# Patient Record
Sex: FEMALE | Race: WHITE | NOT HISPANIC OR LATINO | Employment: OTHER | URBAN - METROPOLITAN AREA
[De-identification: names, ages, dates, MRNs, and addresses within clinical notes are randomized per-mention and may not be internally consistent; named-entity substitution may affect disease eponyms.]

---

## 2017-01-18 ENCOUNTER — OFFICE VISIT (OUTPATIENT)
Dept: NEUROLOGY | Facility: MEDICAL CENTER | Age: 82
End: 2017-01-18
Payer: MEDICARE

## 2017-01-18 VITALS
RESPIRATION RATE: 16 BRPM | TEMPERATURE: 97.7 F | OXYGEN SATURATION: 93 % | HEART RATE: 79 BPM | WEIGHT: 194.8 LBS | HEIGHT: 67 IN | BODY MASS INDEX: 30.57 KG/M2 | SYSTOLIC BLOOD PRESSURE: 116 MMHG | DIASTOLIC BLOOD PRESSURE: 68 MMHG

## 2017-01-18 DIAGNOSIS — G62.89 OTHER POLYNEUROPATHY: ICD-10-CM

## 2017-01-18 DIAGNOSIS — R56.9 SEIZURE (HCC): Chronic | ICD-10-CM

## 2017-01-18 PROCEDURE — G8482 FLU IMMUNIZE ORDER/ADMIN: HCPCS | Performed by: NURSE PRACTITIONER

## 2017-01-18 PROCEDURE — 4040F PNEUMOC VAC/ADMIN/RCVD: CPT | Performed by: NURSE PRACTITIONER

## 2017-01-18 PROCEDURE — 1101F PT FALLS ASSESS-DOCD LE1/YR: CPT | Mod: 8P | Performed by: NURSE PRACTITIONER

## 2017-01-18 PROCEDURE — 1036F TOBACCO NON-USER: CPT | Performed by: NURSE PRACTITIONER

## 2017-01-18 PROCEDURE — G8432 DEP SCR NOT DOC, RNG: HCPCS | Performed by: NURSE PRACTITIONER

## 2017-01-18 PROCEDURE — 99214 OFFICE O/P EST MOD 30 MIN: CPT | Performed by: NURSE PRACTITIONER

## 2017-01-18 PROCEDURE — G8419 CALC BMI OUT NRM PARAM NOF/U: HCPCS | Performed by: NURSE PRACTITIONER

## 2017-01-18 NOTE — PROGRESS NOTES
Subjective:      Judit Ramsey is a 86 y.o. female who presents with Follow-Up for Partial Seizure Disorder.     Here with her  today.    HPI   No concern for seizures and denies an changes in cognition or memory or lapse of awareness.  Last concern for seizure was April 2010.  She continues to take generic Trileptal and Depakote.    Peripheral neuropathy:  Of most concern today.  She is experiencing continuous pain and also notices that it worsens in the evening hours.  She has not tolerated Lyrica and is currently taking a low dose of GBP-- 100mg qhs.  She was unaware that the GBP helps with neuropathy.  Has not noticed drowsiness from the medication addition however she has a low energy level.  Still using topical cream applied each evening which minimally helps.  Her  seems to downplay or discount Judit's discomfort however is more supportive for her to have extra assistance to treat her today.    Arthritic Pain:  Has a history of falls and does not report any falls recently.  She has to move and stand up as well as walk very cautiously.    Her balance is stable when she uses her walker and feels secure with it when walking.      Current Outpatient Prescriptions   Medication Sig Dispense Refill   • losartan (COZAAR) 25 MG Tab Take 1 Tab by mouth every day. 90 Tab 3   • atorvastatin (LIPITOR) 40 MG Tab Take 1 Tab by mouth every evening. 90 Tab 3   • carvedilol (COREG) 6.25 MG Tab Take 1 Tab by mouth 2 times a day, with meals. 180 Tab 3   • divalproex (DEPAKOTE) 500 MG Tablet Delayed Response Take 1 Tab by mouth 2 Times a Day. 180 Tab 3   • oxcarbazepine (TRILEPTAL) 300 MG Tab Take 1 Tab by mouth 2 times a day. 180 Tab 3   • conjugated estrogen (PREMARIN) 0.625 MG/GM CREA Insert 0.5 g in vagina every day.     • aspirin 81 MG tablet Take 81 mg by mouth every day.     • pentosan (ELMIRON) 100 MG CAPS Take 100 mg by mouth 3 times a day before meals.     • omeprazole (PRILOSEC) 20 MG CPDR Take 20 mg  "by mouth every day.       • Ascorbic Acid (VITAMIN C) 100 MG Tab Take  by mouth.     • VITAMIN D, CHOLECALCIFEROL, PO Take  by mouth.     • gabapentin (NEURONTIN) 100 MG Cap Take 100 mg by mouth 2 Times a Day.     • methenamine (MANDELAMINE) 1 GM tablet Take 1 g by mouth 4 times a day.     • pentosan (ELMIRON) 100 MG Cap Take 100 mg by mouth 3 times a day before meals.     • Mirabegron ER (MYRBETRIQ) 50 MG TABLET SR 24 HR Take  by mouth.     • fluticasone-salmeterol (ADVAIR DISKUS) 100-50 MCG/DOSE AEROSOL POWDER, BREATH ACTIVATED Inhale 1 Puff by mouth 2 times a day. Rinse mouth after each use. 3 Inhaler 3   • azithromycin (ZITHROMAX) 250 MG Tab Take 2 tablets on day 1, then take 1 tablet a day for 4 days. 6 Tab 2     No current facility-administered medications for this visit.       Review of Systems   Constitutional: Positive for malaise/fatigue.   HENT: Positive for hearing loss (chronic). Negative for nosebleeds and sore throat.         No recent head injury.   Eyes: Negative for double vision.        No new loss of vision.   Respiratory: Positive for cough (mild).         No recent lung infections.   Cardiovascular: Positive for leg swelling (mild). Negative for chest pain.   Gastrointestinal: Negative for nausea, vomiting, abdominal pain and diarrhea.   Genitourinary: Negative.    Musculoskeletal: Positive for myalgias and joint pain. Negative for falls.   Skin: Negative.    Neurological: Negative for dizziness, seizures and headaches.   Endo/Heme/Allergies:        No history of endocrine dysfunction.  No new problems.   Psychiatric/Behavioral: Positive for depression (possible). The patient is not nervous/anxious.         No recent mood changes.          Objective:     /68 mmHg  Pulse 79  Temp(Src) 36.5 °C (97.7 °F)  Resp 16  Ht 1.702 m (5' 7\")  Wt 88.361 kg (194 lb 12.8 oz)  BMI 30.50 kg/m2  SpO2 93%     Physical Exam   Constitutional: She is oriented to person, place, and time. She appears " well-developed and well-nourished.   HENT:   Head: Normocephalic and atraumatic.   Nose: Nose normal.   Eyes: EOM are normal.   Neck: Normal range of motion.   Cardiovascular: Normal rate and regular rhythm.    Pulmonary/Chest: Effort normal.   Neurological: She is alert and oriented to person, place, and time. She exhibits normal muscle tone. Coordination and gait (using walker, walking with forward bent posture but can stand up straight) abnormal.   No observable changes in neurologic status.  See initial new patient examination for details.    Skin: Skin is warm. There is pallor.   Psychiatric: She has a normal mood and affect.   Quiet but appropriate             Assessment/Plan:     Seizure disorder:  Last known seizure was 4/2010.    Continue VPA 500mg BID and OXC 300mg BID.    Continue supplements as recommended.    Peripheral neuropathy:  Ongoing and more of a concern today.  Using a topical cream for relief.  Has not tolerated Lyrica in the past.  Recently prescribed GBP 100mg qhs-- instructed to increase GBP to 100mg qpm and 100mg qhs.    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  Now using walker.    Return for follow-up in 4 months.  Needs to have drug levels collected in the next 6 months.  She did just have her labs drawn 2 days ago as well not at the met this time.     I spent 35 minutes with this patient, over fifty percent was spent counseling patient on their condition, best management practices, reviewing test results and risks and benefits of treatment.

## 2017-01-18 NOTE — MR AVS SNAPSHOT
"        Judit Ramsey   2017 1:00 PM   Office Visit   MRN: [id number]    Department:  [facility]   Dept Phone:  [phone]    Description:  Female : 1930   Provider:  DENZEL Stephens           Reason for Visit     Follow-Up Epilepsy      Allergies as of 2017     Allergen Noted Reactions    Ciprofloxacin 2011       SEIZURES.    Prednisone 2016         You were diagnosed with     Seizure (CMS-formerly [facility])   [[id number]]         Vital Signs     Blood Pressure Pulse Temperature Respirations Height Weight    116/68 mmHg 79 36.5 °C (97.7 °F) 16 1.702 m (5' 7\") 88.361 kg (194 lb 12.8 oz)    Body Mass Index Oxygen Saturation Smoking Status             30.50 kg/m2 93% Never Smoker          Basic Information     Date Of Birth Sex Race Ethnicity Preferred Language    1930 Female White Non- English      Your appointments     2017  1:40 PM   Established Patient Pul with Andria Forde M.D.   [facility] (--)    [address]  [address]  [place] [address]   [phone]            2017  2:00 PM   FOLLOW UP with Eleazar Wynn M.D.   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   3/19/2008 - Present    Left bundle branch block (Chronic) I44.7   3/29/2011 - Present    Osteoarthritis (Chronic) M19.90   3/19/2008 - Present    Seizure (CMS-HCC) (Chronic) R56.9   2008 - Present    Subjective visual disturbance (Chronic) H53.10   5/3/2011 - Present    Abnormal myocardial perfusion study    2015 - Present    Atypical chest pain R07.89   2/10/2016 - " Present    Epiphora due to insufficient drainage of left side H04.222   8/3/2016 - Present    Hypertension I10   8/24/2016 - Present      Health Maintenance        Date Due Completion Dates    IMM DTaP/Tdap/Td Vaccine (1 - Tdap) 6/7/1949 ---    PAP SMEAR 6/7/1951 ---    MAMMOGRAM 6/7/1970 ---    COLONOSCOPY 6/7/1980 ---    IMM ZOSTER VACCINE 6/7/1990 ---    BONE DENSITY 6/7/1995 ---    IMM PNEUMOCOCCAL 65+ (ADULT) LOW/MEDIUM RISK SERIES (1 of 2 - PCV13) 6/7/1995 ---    IMM INFLUENZA (1) 9/1/2016 10/1/2015            Current Immunizations     Influenza TIV (IM) 10/1/2015      Below and/or attached are the medications your provider expects you to take. Review all of your home medications and newly ordered medications with your provider and/or pharmacist. Follow medication instructions as directed by your provider and/or pharmacist. Please keep your medication list with you and share with your provider. Update the information when medications are discontinued, doses are changed, or new medications (including over-the-counter products) are added; and carry medication information at all times in the event of emergency situations     Allergies:  CIPROFLOXACIN - (reactions not documented)     PREDNISONE - (reactions not documented)               Medications  Valid as of: January 18, 2017 -  1:28 PM    Generic Name Brand Name Tablet Size Instructions for use    Aspirin (Tab) aspirin 81 MG Take 81 mg by mouth every day.        Atorvastatin Calcium (Tab) LIPITOR 40 MG Take 1 Tab by mouth every evening.        Carvedilol (Tab) COREG 6.25 MG Take 1 Tab by mouth 2 times a day, with meals.        Divalproex Sodium (Tablet Delayed Response) DEPAKOTE 500 MG Take 1 Tab by mouth 2 Times a Day.        Estrogens, Conjugated (Cream) PREMARIN 0.625 MG/GM Insert 0.5 g in vagina every day.        Losartan Potassium (Tab) COZAAR 25 MG Take 1 Tab by mouth every day.        Omeprazole (CAPSULE DELAYED RELEASE) PRILOSEC 20 MG Take 20 mg by  mouth every day.          OXcarbazepine (Tab) TRILEPTAL 300 MG Take 1 Tab by mouth 2 times a day.        Pentosan Polysulfate Sodium (Cap) ELMIRON 100 MG Take 100 mg by mouth 3 times a day before meals.        .                 Medicines prescribed today were sent to:     [facility] - [place] [address] [facility]    [address] [facility] [address] [facility] [address]    Phone: [phone] Fax: [phone]    Open 24 Hours?: No    [facility] - [place], [place] - [address] [facility]    [address] [facility] [address]    Phone: [phone] Fax: [phone]    Open 24 Hours?: No      Medication refill instructions:       If your prescription bottle indicates you have medication refills left, it is not necessary to call your provider’s office. Please contact your pharmacy and they will refill your medication.    If your prescription bottle indicates you do not have any refills left, you may request refills at any time through one of the following ways: The online [employer] system (except Urgent Care), by calling your provider’s office, or by asking your pharmacy to contact your provider’s office with a refill request. Medication refills are processed only during regular business hours and may not be available until the next business day. Your provider may request additional information or to have a follow-up visit with you prior to refilling your medication.   *Please Note: Medication refills are assigned a new Rx number when refilled electronically. Your pharmacy may indicate that no refills were authorized even though a new prescription for the same medication is available at the pharmacy. Please request the medicine by name with the pharmacy before contacting your provider for a refill.           TOMS Shoeshart Status: Patient Declined

## 2017-01-19 ENCOUNTER — OFFICE VISIT (OUTPATIENT)
Dept: PULMONOLOGY | Facility: HOSPICE | Age: 82
End: 2017-01-19
Payer: MEDICARE

## 2017-01-19 ENCOUNTER — TELEPHONE (OUTPATIENT)
Dept: PULMONOLOGY | Facility: HOSPICE | Age: 82
End: 2017-01-19

## 2017-01-19 VITALS
SYSTOLIC BLOOD PRESSURE: 122 MMHG | BODY MASS INDEX: 29.98 KG/M2 | OXYGEN SATURATION: 92 % | WEIGHT: 191 LBS | HEART RATE: 87 BPM | DIASTOLIC BLOOD PRESSURE: 70 MMHG | HEIGHT: 67 IN | RESPIRATION RATE: 16 BRPM | TEMPERATURE: 97.2 F

## 2017-01-19 DIAGNOSIS — J40 BRONCHITIS: ICD-10-CM

## 2017-01-19 DIAGNOSIS — J98.19 RIGHT MIDDLE LOBE SYNDROME: ICD-10-CM

## 2017-01-19 PROCEDURE — 99214 OFFICE O/P EST MOD 30 MIN: CPT | Performed by: INTERNAL MEDICINE

## 2017-01-19 RX ORDER — AZITHROMYCIN 250 MG/1
TABLET, FILM COATED ORAL
Qty: 6 TAB | Refills: 2 | Status: SHIPPED | OUTPATIENT
Start: 2017-01-19 | End: 2017-08-04

## 2017-01-19 RX ORDER — RIBOFLAVIN (VITAMIN B2) 100 MG
TABLET ORAL
COMMUNITY
End: 2019-06-28 | Stop reason: CLARIF

## 2017-01-19 RX ORDER — GABAPENTIN 100 MG/1
100 CAPSULE ORAL 2 TIMES DAILY
COMMUNITY
End: 2019-06-28 | Stop reason: CLARIF

## 2017-01-19 RX ORDER — METHENAMINE MANDELATE 1000 MG/1
1 TABLET, FILM COATED ORAL 4 TIMES DAILY
COMMUNITY
End: 2018-02-21

## 2017-01-19 NOTE — MR AVS SNAPSHOT
"Judit Ramsey   2017 1:40 PM   Office Visit   MRN: [id number]    Department:  [facility]   Dept Phone:  [phone]    Description:  Female : 1930   Provider:  Andria Forde M.D.           Reason for Visit     Follow-Up SP lobectomy      Allergies as of 2017     Allergen Noted Reactions    Ciprofloxacin 2011       SEIZURES.    Prednisone 2016         You were diagnosed with     Bronchitis   [930258]         Vital Signs     Blood Pressure Pulse Temperature Respirations Height Weight    122/70 mmHg 87 36.2 °C (97.2 °F) 16 1.702 m (5' 7\") 86.637 kg (191 lb)    Body Mass Index Oxygen Saturation Smoking Status             29.91 kg/m2 92% Never Smoker          Basic Information     Date Of Birth Sex Race Ethnicity Preferred Language    1930 Female White Non- English      Your appointments     2017  1:40 PM   Established Patient Pul with Andria Forde M.D.   [facility] (--)    [address]  [address]  [place] [id number] [phone]            2017  2:00 PM   FOLLOW UP with Eleazar Wynn M.D.   [facility] (--)    [address], [address]  [place] [address] [phone]            2017  1:00 PM   Established Patient Pul with Andria Forde M.D.   [facility] (--)    [address]  [address]  [place] [address] [phone]            2017  1:00 PM   Follow Up Visit with DENZEL Stephens   [facility] Neurology (--)    [address]  [place] [address] [phone]           You will be receiving a confirmation call a few days before your appointment from our automated call confirmation system.              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   3/19/2008 - Present    Left bundle branch block (Chronic) I44.7   3/29/2011 - Present    Osteoarthritis (Chronic) M19.90   3/19/2008 - Present    Seizure (CMS-HCC) (Chronic) R56.9   9/2/2008 - Present    Subjective visual disturbance (Chronic) H53.10   5/3/2011 - Present    Abnormal myocardial perfusion study    4/22/2015 - Present    Atypical chest pain R07.89   2/10/2016 - Present    Epiphora due to insufficient drainage of left side H04.222   8/3/2016 - Present    Hypertension I10   8/24/2016 - Present      Health Maintenance        Date Due Completion Dates    IMM DTaP/Tdap/Td Vaccine (1 - Tdap) 6/7/1949 ---    PAP SMEAR 6/7/1951 ---    MAMMOGRAM 6/7/1970 ---    COLONOSCOPY 6/7/1980 ---    IMM ZOSTER VACCINE 6/7/1990 ---    BONE DENSITY 6/7/1995 ---    IMM PNEUMOCOCCAL 65+ (ADULT) LOW/MEDIUM RISK SERIES (1 of 2 - PCV13) 6/7/1995 ---    IMM INFLUENZA (1) 9/1/2016 10/1/2015            Current Immunizations     13-VALENT PCV PREVNAR 9/25/2015    Influenza TIV (IM) 9/28/2016, 10/1/2015, 11/22/2011      Below and/or attached are the medications your provider expects you to take. Review all of your home medications and newly ordered medications with your provider and/or pharmacist. Follow medication instructions as directed by your provider and/or pharmacist. Please keep your medication list with you and share with your provider. Update the information when medications are discontinued, doses are changed, or new medications (including over-the-counter products) are added; and carry medication information at all times in the event of emergency situations     Allergies:  CIPROFLOXACIN - (reactions not documented)     PREDNISONE - (reactions not documented)               Medications  Valid as of: January 19, 2017 -  1:27 PM    Generic Name Brand Name Tablet Size Instructions for use    Ascorbic Acid  (Tab) Vitamin C 100 MG Take  by mouth.        Aspirin (Tab) aspirin 81 MG Take 81 mg by mouth every day.        Atorvastatin Calcium (Tab) LIPITOR 40 MG Take 1 Tab by mouth every evening.        Azithromycin (Tab) ZITHROMAX 250 MG Take 2 tablets on day 1, then take 1 tablet a day for 4 days.        Carvedilol (Tab) COREG 6.25 MG Take 1 Tab by mouth 2 times a day, with meals.        Cholecalciferol   Take  by mouth.        Divalproex Sodium (Tablet Delayed Response) DEPAKOTE 500 MG Take 1 Tab by mouth 2 Times a Day.        Estrogens, Conjugated (Cream) PREMARIN 0.625 MG/GM Insert 0.5 g in vagina every day.        Fluticasone-Salmeterol (AEROSOL POWDER, BREATH ACTIVATED) ADVAIR 100-50 MCG/DOSE Inhale 1 Puff by mouth 2 times a day. Rinse mouth after each use.        Gabapentin (Cap) NEURONTIN 100 MG Take 100 mg by mouth 2 Times a Day.        Losartan Potassium (Tab) COZAAR 25 MG Take 1 Tab by mouth every day.        Methenamine Mandelate (Tab) MANDELAMINE 1 GM Take 1 g by mouth 4 times a day.        Mirabegron (TABLET SR 24 HR) Mirabegron ER 50 MG Take  by mouth.        Omeprazole (CAPSULE DELAYED RELEASE) PRILOSEC 20 MG Take 20 mg by mouth every day.          OXcarbazepine (Tab) TRILEPTAL 300 MG Take 1 Tab by mouth 2 times a day.        Pentosan Polysulfate Sodium (Cap) ELMIRON 100 MG Take 100 mg by mouth 3 times a day before meals.        Pentosan Polysulfate Sodium (Cap) ELMIRON 100 MG Take 100 mg by mouth 3 times a day before meals.        .                 Medicines prescribed today were sent to:     [facility] - [address] [facility] [address]    Phone: [phone] Fax: [phone]    Open 24 Hours?: No    [id number] - [place] - [address] [facility]    [address] [place] [address]    Phone: [phone] Fax: [phone]    Open 24 Hours?: No      Medication refill instructions:       If your prescription bottle indicates  you have medication refills left, it is not necessary to call your provider’s office. Please contact your pharmacy and they will refill your medication.    If your prescription bottle indicates you do not have any refills left, you may request refills at any time through one of the following ways: The online [employer] system (except Urgent Care), by calling your provider’s office, or by asking your pharmacy to contact your provider’s office with a refill request. Medication refills are processed only during regular business hours and may not be available until the next business day. Your provider may request additional information or to have a follow-up visit with you prior to refilling your medication.   *Please Note: Medication refills are assigned a new Rx number when refilled electronically. Your pharmacy may indicate that no refills were authorized even though a new prescription for the same medication is available at the pharmacy. Please request the medicine by name with the pharmacy before contacting your provider for a refill.           Kwanjihart Status: Patient Declined

## 2017-01-19 NOTE — PROGRESS NOTES
"Chief Complaint   Patient presents with   • Follow-Up     SP lobectomy       HPI: This patient is an 86 y.o. Female who returns for pulmonary follow-up. 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 She is a lifelong nonsmoker. She has mild exertional dyspnea, and over the past week developed a productive cough. She typically has very infrequent cough. She uses Advair discus 100/50. Denies fevers, chills, headaches, wheezing, chest tightness or edema. She feels well overall and has no complaints other than cough. She has not required antibiotics in the past 6 months.  She is up-to-date on influenza and pneumococcal vaccines.    Past Medical History   Diagnosis Date   • CHF (congestive heart failure) (CMS-HCC) 8/3/2011   • Esophageal reflux 3/19/2008   • Fatigue 5/3/2011   • HLD (hyperlipidemia) 3/29/2011   • Unspecified vertiginous syndromes and labyrinthine disorders 3/19/2008   • Left bundle branch block 3/29/2011   • Osteoarthritis 3/19/2008   • Subjective visual disturbance, unspecified 5/3/2011   • Incontinence    • Arthritis      bilateral knees   • Cancer (CMS-HCC)      skin cancer   • Hypertension    • Hiatus hernia syndrome    • Asthma    • Urinary bladder disorder    • Pneumonia      2010   • Breath shortness      02 @ 2L con't   • Cataract      bilateral IOL   • CHEST PAIN 5/3/2011   • Heart burn      \"chronic pain in my stomach\"   • Seizure (CMS-HCC) 9/2/2008     Pt states some anesthesia causes seizures, last seizure 4 years ago   • Anesthesia      Pt and spouse state, anesthesia causes her seizures, she has had several seizures at least 4 in PACU, following several different surgeries.  She has had colonoscopies and cataract surgery and did fine.       Social History     Social History   • Marital Status:      Spouse Name: N/A   • Number of Children: N/A   • Years of Education: N/A     Occupational History   • Not on file.     Social History Main Topics   • Smoking " status: Never Smoker    • Smokeless tobacco: Never Used   • Alcohol Use: No   • Drug Use: No   • Sexual Activity: Not on file     Other Topics Concern   • Not on file     Social History Narrative       Family History   Problem Relation Age of Onset   • Stroke Mother    • Heart Disease Father        Current Outpatient Prescriptions on File Prior to Visit   Medication Sig Dispense Refill   • losartan (COZAAR) 25 MG Tab Take 1 Tab by mouth every day. 90 Tab 3   • atorvastatin (LIPITOR) 40 MG Tab Take 1 Tab by mouth every evening. 90 Tab 3   • carvedilol (COREG) 6.25 MG Tab Take 1 Tab by mouth 2 times a day, with meals. 180 Tab 3   • divalproex (DEPAKOTE) 500 MG Tablet Delayed Response Take 1 Tab by mouth 2 Times a Day. 180 Tab 3   • oxcarbazepine (TRILEPTAL) 300 MG Tab Take 1 Tab by mouth 2 times a day. 180 Tab 3   • conjugated estrogen (PREMARIN) 0.625 MG/GM CREA Insert 0.5 g in vagina every day.     • aspirin 81 MG tablet Take 81 mg by mouth every day.     • pentosan (ELMIRON) 100 MG CAPS Take 100 mg by mouth 3 times a day before meals.     • omeprazole (PRILOSEC) 20 MG CPDR Take 20 mg by mouth every day.         No current facility-administered medications on file prior to visit.       Allergies: Ciprofloxacin and Prednisone    ROS:   Constitutional: Denies fevers, chills, night sweats, fatigue or weight loss  Eyes: Denies vision loss, pain, drainage, double vision  Ears, Nose, Throat: Denies earache, difficulty hearing, tinnitus, nasal congestion, hoarseness  Cardiovascular: Denies chest pain, tightness, palpitations, orthopnea or edema  Respiratory: As in history of present illness  Sleep: Denies daytime sleepiness, snoring, apneas, insomnia, morning headaches  GI: Denies heartburn, dysphagia, nausea, abdominal pain, diarrhea or constipation  : Denies frequent urination, hematuria, discharge or painful urination  Musculoskeletal: Denies back pain, painful joints, sore muscles  Neurological: Denies weakness or  "headaches  Skin: No rashes    Blood pressure 122/70, pulse 87, temperature 36.2 °C (97.2 °F), resp. rate 16, height 1.702 m (5' 7\"), weight 86.637 kg (191 lb), SpO2 92 %.  Multi-Ox Readings  Multi Ox #1     O2 sat % at rest     O2 sat % on exertion     O2 sat average on exertion     Multi Ox #2     O2 sat % at rest     O2 sat % on exertion     O2 sat average on exertion       Oxygen Use     Oxygen Frequency     Duration of need     Is the patient mobile within the home?     CPAP Use?     BIPAP Use?     Servo Titration         Physical Exam:  Appearance: Well-nourished, well-developed, in no acute distress  HEENT: Normocephalic, atraumatic, white sclera, PERRLA, oropharynx clear  Neck: No adenopathy or masses  Respiratory: no intercostal retractions or accessory muscle use  Lungs auscultation: Clear to auscultation bilaterally  Cardiovascular: Regular rate rhythm. No murmurs, rubs or gallops.  No LE edema  Abdomen: soft, nondistended  Gait: Normal  Digits: No clubbing, cyanosis  Motor: No focal deficits  Orientation: Oriented to time, person and place    Diagnosis:  1. Bronchitis  fluticasone-salmeterol (ADVAIR DISKUS) 100-50 MCG/DOSE AEROSOL POWDER, BREATH ACTIVATED    azithromycin (ZITHROMAX) 250 MG Tab   2. Right middle lobe syndrome      s/p lobectomy       Plan:  Overall, Judit continues to do well with no significant respiratory symptomatology.  Z-Grey for acute bronchitis.  Continue Advair discus 100/50 twice a day. Continue albuterol HFA 1-2 puffs every 4 hours when necessary.  Return in about 6 months (around 7/19/2017) for follow up visit with Andria Forde MD.        "

## 2017-01-20 NOTE — TELEPHONE ENCOUNTER
I spoke with Starla the pharmacist to call in Advair Diskus 100-50 Inhale 1 puff by mouth 2 times a  Day. Rinse mouth after each use. Qty:3 Refills:3 per  prescription and per Patient request.  [facility] pharmacy number [phone]

## 2017-01-24 ASSESSMENT — ENCOUNTER SYMPTOMS
HEADACHES: 0
DEPRESSION: 1
NERVOUS/ANXIOUS: 0
DIARRHEA: 0
VOMITING: 0
DIZZINESS: 0
FALLS: 0
NAUSEA: 0
MYALGIAS: 1
SEIZURES: 0
ABDOMINAL PAIN: 0
DOUBLE VISION: 0
SORE THROAT: 0
COUGH: 1

## 2017-03-06 ENCOUNTER — OFFICE VISIT (OUTPATIENT)
Dept: CARDIOLOGY | Facility: MEDICAL CENTER | Age: 82
End: 2017-03-06
Payer: MEDICARE

## 2017-03-06 VITALS
WEIGHT: 191 LBS | HEIGHT: 67 IN | BODY MASS INDEX: 29.98 KG/M2 | HEART RATE: 104 BPM | DIASTOLIC BLOOD PRESSURE: 70 MMHG | SYSTOLIC BLOOD PRESSURE: 110 MMHG | OXYGEN SATURATION: 92 %

## 2017-03-06 DIAGNOSIS — I44.7 LEFT BUNDLE BRANCH BLOCK: Chronic | ICD-10-CM

## 2017-03-06 DIAGNOSIS — R07.89 ATYPICAL CHEST PAIN: ICD-10-CM

## 2017-03-06 PROCEDURE — G8482 FLU IMMUNIZE ORDER/ADMIN: HCPCS | Performed by: INTERNAL MEDICINE

## 2017-03-06 PROCEDURE — G8432 DEP SCR NOT DOC, RNG: HCPCS | Performed by: INTERNAL MEDICINE

## 2017-03-06 PROCEDURE — 1036F TOBACCO NON-USER: CPT | Performed by: INTERNAL MEDICINE

## 2017-03-06 PROCEDURE — 99213 OFFICE O/P EST LOW 20 MIN: CPT | Performed by: INTERNAL MEDICINE

## 2017-03-06 PROCEDURE — 4040F PNEUMOC VAC/ADMIN/RCVD: CPT | Performed by: INTERNAL MEDICINE

## 2017-03-06 PROCEDURE — G8420 CALC BMI NORM PARAMETERS: HCPCS | Performed by: INTERNAL MEDICINE

## 2017-03-06 PROCEDURE — 1101F PT FALLS ASSESS-DOCD LE1/YR: CPT | Mod: 8P | Performed by: INTERNAL MEDICINE

## 2017-03-06 ASSESSMENT — ENCOUNTER SYMPTOMS
SHORTNESS OF BREATH: 1
BRUISES/BLEEDS EASILY: 0
BLURRED VISION: 1
HEARTBURN: 1
NEUROLOGICAL NEGATIVE: 1
PALPITATIONS: 0
WEIGHT LOSS: 0

## 2017-03-06 NOTE — MR AVS SNAPSHOT
"Judti Ramsey   3/6/2017 9:40 AM   Office Visit   MRN: [id number]    Department:  [facility]   Dept Phone:  [phone]    Description:  Female : 1930   Provider:  Eleazar Wynn M.D.           Reason for Visit     Follow-Up           Allergies as of 3/6/2017     Allergen Noted Reactions    Ciprofloxacin 2011       SEIZURES.    Prednisone 2016         Vital Signs     Blood Pressure Pulse Height Weight Body Mass Index Oxygen Saturation    110/70 mmHg 104 1.702 m (5' 7.01\") 86.637 kg (191 lb) 29.91 kg/m2 92%    Smoking Status                   Never Smoker            Basic Information     Date Of Birth Sex Race Ethnicity Preferred Language    1930 Female White Non- English      Your appointments     2017  1:00 PM   Established Patient Pul with Andria Forde M.D.   [facility] Pulmonary Medicine (--)    [address]  [address]  [facility] [id number] [phone]            2017  1:00 PM   Follow Up Visit with DENZEL Stephens   [facility] Neurology (--)    [address], [address]  [place] [address] [phone]           You will be receiving a confirmation call a few days before your appointment from our automated call confirmation system.              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   3/19/2008 - Present    Left bundle branch block (Chronic) I44.7   3/29/2011 - Present    Osteoarthritis (Chronic) M19.90   3/19/2008 - Present    Seizure (CMS-HCC) (Chronic) R56.9   2008 - Present    Subjective visual disturbance (Chronic) H53.10   5/3/2011 - Present    Abnormal myocardial perfusion study    2015 - Present    Atypical chest pain R07.89   2/10/2016 " - Present    Epiphora due to insufficient drainage of left side H04.222   8/3/2016 - Present    Hypertension I10   8/24/2016 - Present      Health Maintenance        Date Due Completion Dates    IMM DTaP/Tdap/Td Vaccine (1 - Tdap) 6/7/1949 ---    PAP SMEAR 6/7/1951 ---    MAMMOGRAM 6/7/1970 ---    COLONOSCOPY 6/7/1980 ---    IMM ZOSTER VACCINE 6/7/1990 ---    BONE DENSITY 6/7/1995 ---    IMM PNEUMOCOCCAL 65+ (ADULT) LOW/MEDIUM RISK SERIES (2 of 2 - PPSV23) 9/25/2016 9/25/2015            Current Immunizations     13-VALENT PCV PREVNAR 9/25/2015    Influenza TIV (IM) 9/28/2016, 10/1/2015, 11/22/2011      Below and/or attached are the medications your provider expects you to take. Review all of your home medications and newly ordered medications with your provider and/or pharmacist. Follow medication instructions as directed by your provider and/or pharmacist. Please keep your medication list with you and share with your provider. Update the information when medications are discontinued, doses are changed, or new medications (including over-the-counter products) are added; and carry medication information at all times in the event of emergency situations     Allergies:  CIPROFLOXACIN - (reactions not documented)     PREDNISONE - (reactions not documented)               Medications  Valid as of: March 06, 2017 - 10:39 AM    Generic Name Brand Name Tablet Size Instructions for use    Ascorbic Acid (Tab) Vitamin C 100 MG Take  by mouth.        Aspirin (Tab) aspirin 81 MG Take 81 mg by mouth every day.        Atorvastatin Calcium (Tab) LIPITOR 40 MG Take 1 Tab by mouth every evening.        Azithromycin (Tab) ZITHROMAX 250 MG Take 2 tablets on day 1, then take 1 tablet a day for 4 days.        Carvedilol (Tab) COREG 6.25 MG Take 1 Tab by mouth 2 times a day, with meals.        Cholecalciferol   Take  by mouth.        Divalproex Sodium (Tablet Delayed Response) DEPAKOTE 500 MG Take 1 Tab by mouth 2 Times a Day.         Estrogens, Conjugated (Cream) PREMARIN 0.625 MG/GM Insert 0.5 g in vagina every day.        Fluticasone-Salmeterol (AEROSOL POWDER, BREATH ACTIVATED) ADVAIR 100-50 MCG/DOSE Inhale 1 Puff by mouth 2 times a day. Rinse mouth after each use.        Gabapentin (Cap) NEURONTIN 100 MG Take 100 mg by mouth 2 Times a Day.        Losartan Potassium (Tab) COZAAR 25 MG Take 1 Tab by mouth every day.        Methenamine Mandelate (Tab) MANDELAMINE 1 GM Take 1 g by mouth 4 times a day.        Mirabegron (TABLET SR 24 HR) Mirabegron ER 50 MG Take  by mouth.        Omeprazole (CAPSULE DELAYED RELEASE) PRILOSEC 20 MG Take 20 mg by mouth every day.          OXcarbazepine (Tab) TRILEPTAL 300 MG Take 1 Tab by mouth 2 times a day.        Pentosan Polysulfate Sodium (Cap) ELMIRON 100 MG Take 100 mg by mouth 3 times a day before meals.        Pentosan Polysulfate Sodium (Cap) ELMIRON 100 MG Take 100 mg by mouth 3 times a day before meals.        .                 Medicines prescribed today were sent to:     [facility] - [address] [facility] [address]    Phone: [phone] Fax: [phone]    Open 24 Hours?: No    [facility] [address] [place], [place] - [address] [facility]    [id number] [place] [address]    Phone: [phone] Fax: [phone]    Open 24 Hours?: No      Medication refill instructions:       If your prescription bottle indicates you have medication refills left, it is not necessary to call your provider’s office. Please contact your pharmacy and they will refill your medication.    If your prescription bottle indicates you do not have any refills left, you may request refills at any time through one of the following ways: The online [employer] system (except Urgent Care), by calling your provider’s office, or by asking your pharmacy to contact your provider’s office with a refill request. Medication refills are processed only during regular business  hours and may not be available until the next business day. Your provider may request additional information or to have a follow-up visit with you prior to refilling your medication.   *Please Note: Medication refills are assigned a new Rx number when refilled electronically. Your pharmacy may indicate that no refills were authorized even though a new prescription for the same medication is available at the pharmacy. Please request the medicine by name with the pharmacy before contacting your provider for a refill.           MyChart Status: Patient Declined

## 2017-03-06 NOTE — Clinical Note
"     [facility]   [address], [address]  [place] [address]  Phone: [phone]  Fax: [phone]              Judit Ramsey  6/7/1930    Encounter Date: 3/6/2017    Eleazar Wynn M.D.          PROGRESS NOTE:  Subjective:   Judit Ramsey is a 86 y.o. female who presents today for follow-up of left bundle branch block and history of atypical chest pain. She has had no syncope or presyncope.  The patient has persistent left sided localized chest pain. Today's pain is bit higher than noted previously. She has intense pain to palpation in the area that she complains about. No pain radiating across her chest or to her neck or jaw. She is not physically active and uses a walker because of knee arthritis    Past Medical History   Diagnosis Date   • CHF (congestive heart failure) (CMS-HCC) 8/3/2011   • Esophageal reflux 3/19/2008   • Fatigue 5/3/2011   • HLD (hyperlipidemia) 3/29/2011   • Unspecified vertiginous syndromes and labyrinthine disorders 3/19/2008   • Left bundle branch block 3/29/2011   • Osteoarthritis 3/19/2008   • Subjective visual disturbance, unspecified 5/3/2011   • Incontinence    • Arthritis      bilateral knees   • Cancer (CMS-HCC)      skin cancer   • Hypertension    • Hiatus hernia syndrome    • Asthma    • Urinary bladder disorder    • Pneumonia      2010   • Breath shortness      02 @ 2L con't   • Cataract      bilateral IOL   • CHEST PAIN 5/3/2011   • Heart burn      \"chronic pain in my stomach\"   • Seizure (CMS-HCC) 9/2/2008     Pt states some anesthesia causes seizures, last seizure 4 years ago   • Anesthesia      Pt and spouse state, anesthesia causes her seizures, she has had several seizures at least 4 in PACU, following several different surgeries.  She has had colonoscopies and cataract surgery and did fine.     Past Surgical History   Procedure Laterality Date   • General lung surgery       LUNG SEGMENTAL RESECTION.   • " Cholecystectomy     • Knee replacement, total  2010     BILATERAL   • Abdominal hysterectomy total     • Ectropian repair Left 8/3/2016     Procedure: ECTROPIAN REPAIR - LOWER LID WITH LATERAL TARSAL STRIP;  Surgeon: Daniel Pimentel M.D.;  Location: [facility];  Service:      Family History   Problem Relation Age of Onset   • Stroke Mother    • Heart Disease Father      History   Smoking status   • Never Smoker    Smokeless tobacco   • Never Used     Allergies   Allergen Reactions   • Ciprofloxacin      SEIZURES.   • Prednisone      Outpatient Encounter Prescriptions as of 3/6/2017   Medication Sig Dispense Refill   • Ascorbic Acid (VITAMIN C) 100 MG Tab Take  by mouth.     • VITAMIN D, CHOLECALCIFEROL, PO Take  by mouth.     • gabapentin (NEURONTIN) 100 MG Cap Take 100 mg by mouth 2 Times a Day.     • methenamine (MANDELAMINE) 1 GM tablet Take 1 g by mouth 4 times a day.     • pentosan (ELMIRON) 100 MG Cap Take 100 mg by mouth 3 times a day before meals.     • Mirabegron ER (MYRBETRIQ) 50 MG TABLET SR 24 HR Take  by mouth.     • fluticasone-salmeterol (ADVAIR DISKUS) 100-50 MCG/DOSE AEROSOL POWDER, BREATH ACTIVATED Inhale 1 Puff by mouth 2 times a day. Rinse mouth after each use. 3 Inhaler 3   • azithromycin (ZITHROMAX) 250 MG Tab Take 2 tablets on day 1, then take 1 tablet a day for 4 days. 6 Tab 2   • losartan (COZAAR) 25 MG Tab Take 1 Tab by mouth every day. 90 Tab 3   • atorvastatin (LIPITOR) 40 MG Tab Take 1 Tab by mouth every evening. 90 Tab 3   • carvedilol (COREG) 6.25 MG Tab Take 1 Tab by mouth 2 times a day, with meals. 180 Tab 3   • divalproex (DEPAKOTE) 500 MG Tablet Delayed Response Take 1 Tab by mouth 2 Times a Day. 180 Tab 3   • oxcarbazepine (TRILEPTAL) 300 MG Tab Take 1 Tab by mouth 2 times a day. 180 Tab 3   • conjugated estrogen (PREMARIN) 0.625 MG/GM CREA Insert 0.5 g in vagina every day.     • aspirin 81 MG tablet Take 81 mg by mouth every day.     • pentosan (ELMIRON) 100 MG CAPS  "Take 100 mg by mouth 3 times a day before meals.     • omeprazole (PRILOSEC) 20 MG CPDR Take 20 mg by mouth every day.         No facility-administered encounter medications on file as of 3/6/2017.     Review of Systems   Constitutional: Negative for weight loss.   Eyes: Positive for blurred vision.   Respiratory: Positive for shortness of breath.    Cardiovascular: Positive for chest pain. Negative for palpitations.   Gastrointestinal: Positive for heartburn.   Skin: Negative for rash.   Neurological: Negative.    Endo/Heme/Allergies: Does not bruise/bleed easily.        Objective:   /70 mmHg  Pulse 104  Ht 1.702 m (5' 7.01\")  Wt 86.637 kg (191 lb)  BMI 29.91 kg/m2  SpO2 92%    Physical Exam   Constitutional: She is oriented to person, place, and time. No distress.   Elderly, frail.  Uses a walker   HENT:   Head: Normocephalic and atraumatic.   Eyes: Pupils are equal, round, and reactive to light. No scleral icterus.   Neck: No JVD present.   Cardiovascular: Normal rate.    Pulmonary/Chest: Effort normal. No respiratory distress. She has no wheezes.   Chest wall pain to palpation left upper chest approximately a 3rd interspace in the anterior axillary line   Abdominal: Soft. She exhibits no distension. There is no tenderness.   Musculoskeletal: She exhibits no edema.   Neurological: She is alert and oriented to person, place, and time. No cranial nerve deficit.   Skin: Skin is dry.   Psychiatric: She has a normal mood and affect.       Assessment:     1. Atypical chest pain     2. Left bundle branch block         Medical Decision Making:  Today's Assessment / Status / Plan:     The patient has musculoskeletal chest pain which she is very concerned about. Strong reassurance given. The pain is localized and clearly reproduced with with palpation. She has a chronic left bundle branch block which is asymptomatic. About 2 years ago she had a nuclear scan that was abnormal, which may be secondary to her bundle " branch block. Because of her advanced age, I would not recommend any further testing unless she develops anginal pain.  Her blood pressure is under excellent control.   She could return as needed, but would prefer a regular appointment so we'll see her again in one year      Eyad Torres M.D.  [address] [facility] [address]  [place] [address]  VIA Facsimile: [phone]

## 2017-03-06 NOTE — PROGRESS NOTES
"Subjective:   Judit Ramsey is a 86 y.o. female who presents today for follow-up of left bundle branch block and history of atypical chest pain. She has had no syncope or presyncope.  The patient has persistent left sided localized chest pain. Today's pain is bit higher than noted previously. She has intense pain to palpation in the area that she complains about. No pain radiating across her chest or to her neck or jaw. She is not physically active and uses a walker because of knee arthritis    Past Medical History   Diagnosis Date   • CHF (congestive heart failure) (CMS-HCC) 8/3/2011   • Esophageal reflux 3/19/2008   • Fatigue 5/3/2011   • HLD (hyperlipidemia) 3/29/2011   • Unspecified vertiginous syndromes and labyrinthine disorders 3/19/2008   • Left bundle branch block 3/29/2011   • Osteoarthritis 3/19/2008   • Subjective visual disturbance, unspecified 5/3/2011   • Incontinence    • Arthritis      bilateral knees   • Cancer (CMS-HCC)      skin cancer   • Hypertension    • Hiatus hernia syndrome    • Asthma    • Urinary bladder disorder    • Pneumonia      2010   • Breath shortness      02 @ 2L con't   • Cataract      bilateral IOL   • CHEST PAIN 5/3/2011   • Heart burn      \"chronic pain in my stomach\"   • Seizure (CMS-HCC) 9/2/2008     Pt states some anesthesia causes seizures, last seizure 4 years ago   • Anesthesia      Pt and spouse state, anesthesia causes her seizures, she has had several seizures at least 4 in PACU, following several different surgeries.  She has had colonoscopies and cataract surgery and did fine.     Past Surgical History   Procedure Laterality Date   • General lung surgery       LUNG SEGMENTAL RESECTION.   • Cholecystectomy     • Knee replacement, total  2010     BILATERAL   • Abdominal hysterectomy total     • Ectropian repair Left 8/3/2016     Procedure: ECTROPIAN REPAIR - LOWER LID WITH LATERAL TARSAL STRIP;  Surgeon: Daniel Pimentel M.D.;  Location: [facility];  " Service:      Family History   Problem Relation Age of Onset   • Stroke Mother    • Heart Disease Father      History   Smoking status   • Never Smoker    Smokeless tobacco   • Never Used     Allergies   Allergen Reactions   • Ciprofloxacin      SEIZURES.   • Prednisone      Outpatient Encounter Prescriptions as of 3/6/2017   Medication Sig Dispense Refill   • Ascorbic Acid (VITAMIN C) 100 MG Tab Take  by mouth.     • VITAMIN D, CHOLECALCIFEROL, PO Take  by mouth.     • gabapentin (NEURONTIN) 100 MG Cap Take 100 mg by mouth 2 Times a Day.     • methenamine (MANDELAMINE) 1 GM tablet Take 1 g by mouth 4 times a day.     • pentosan (ELMIRON) 100 MG Cap Take 100 mg by mouth 3 times a day before meals.     • Mirabegron ER (MYRBETRIQ) 50 MG TABLET SR 24 HR Take  by mouth.     • fluticasone-salmeterol (ADVAIR DISKUS) 100-50 MCG/DOSE AEROSOL POWDER, BREATH ACTIVATED Inhale 1 Puff by mouth 2 times a day. Rinse mouth after each use. 3 Inhaler 3   • azithromycin (ZITHROMAX) 250 MG Tab Take 2 tablets on day 1, then take 1 tablet a day for 4 days. 6 Tab 2   • losartan (COZAAR) 25 MG Tab Take 1 Tab by mouth every day. 90 Tab 3   • atorvastatin (LIPITOR) 40 MG Tab Take 1 Tab by mouth every evening. 90 Tab 3   • carvedilol (COREG) 6.25 MG Tab Take 1 Tab by mouth 2 times a day, with meals. 180 Tab 3   • divalproex (DEPAKOTE) 500 MG Tablet Delayed Response Take 1 Tab by mouth 2 Times a Day. 180 Tab 3   • oxcarbazepine (TRILEPTAL) 300 MG Tab Take 1 Tab by mouth 2 times a day. 180 Tab 3   • conjugated estrogen (PREMARIN) 0.625 MG/GM CREA Insert 0.5 g in vagina every day.     • aspirin 81 MG tablet Take 81 mg by mouth every day.     • pentosan (ELMIRON) 100 MG CAPS Take 100 mg by mouth 3 times a day before meals.     • omeprazole (PRILOSEC) 20 MG CPDR Take 20 mg by mouth every day.         No facility-administered encounter medications on file as of 3/6/2017.     Review of Systems   Constitutional: Negative for weight loss.   Eyes:  "Positive for blurred vision.   Respiratory: Positive for shortness of breath.    Cardiovascular: Positive for chest pain. Negative for palpitations.   Gastrointestinal: Positive for heartburn.   Skin: Negative for rash.   Neurological: Negative.    Endo/Heme/Allergies: Does not bruise/bleed easily.        Objective:   /70 mmHg  Pulse 104  Ht 1.702 m (5' 7.01\")  Wt 86.637 kg (191 lb)  BMI 29.91 kg/m2  SpO2 92%    Physical Exam   Constitutional: She is oriented to person, place, and time. No distress.   Elderly, frail.  Uses a walker   HENT:   Head: Normocephalic and atraumatic.   Eyes: Pupils are equal, round, and reactive to light. No scleral icterus.   Neck: No JVD present.   Cardiovascular: Normal rate.    Pulmonary/Chest: Effort normal. No respiratory distress. She has no wheezes.   Chest wall pain to palpation left upper chest approximately a 3rd interspace in the anterior axillary line   Abdominal: Soft. She exhibits no distension. There is no tenderness.   Musculoskeletal: She exhibits no edema.   Neurological: She is alert and oriented to person, place, and time. No cranial nerve deficit.   Skin: Skin is dry.   Psychiatric: She has a normal mood and affect.       Assessment:     1. Atypical chest pain     2. Left bundle branch block         Medical Decision Making:  Today's Assessment / Status / Plan:     The patient has musculoskeletal chest pain which she is very concerned about. Strong reassurance given. The pain is localized and clearly reproduced with with palpation. She has a chronic left bundle branch block which is asymptomatic. About 2 years ago she had a nuclear scan that was abnormal, which may be secondary to her bundle branch block. Because of her advanced age, I would not recommend any further testing unless she develops anginal pain.  Her blood pressure is under excellent control.   She could return as needed, but would prefer a regular appointment so we'll see her again in one " year

## 2017-04-01 ENCOUNTER — OFFICE VISIT (OUTPATIENT)
Dept: URGENT CARE | Facility: CLINIC | Age: 82
End: 2017-04-01
Payer: MEDICARE

## 2017-04-01 VITALS
RESPIRATION RATE: 16 BRPM | TEMPERATURE: 97.9 F | HEART RATE: 82 BPM | DIASTOLIC BLOOD PRESSURE: 70 MMHG | SYSTOLIC BLOOD PRESSURE: 110 MMHG | OXYGEN SATURATION: 94 %

## 2017-04-01 DIAGNOSIS — N39.0 URINARY TRACT INFECTION, SITE UNSPECIFIED: ICD-10-CM

## 2017-04-01 DIAGNOSIS — K13.79 MOUTH SORES: ICD-10-CM

## 2017-04-01 DIAGNOSIS — K52.1 ANTIBIOTIC-ASSOCIATED DIARRHEA: ICD-10-CM

## 2017-04-01 DIAGNOSIS — T36.95XA ANTIBIOTIC-ASSOCIATED DIARRHEA: ICD-10-CM

## 2017-04-01 PROCEDURE — 4040F PNEUMOC VAC/ADMIN/RCVD: CPT | Performed by: PHYSICIAN ASSISTANT

## 2017-04-01 PROCEDURE — G8419 CALC BMI OUT NRM PARAM NOF/U: HCPCS | Performed by: PHYSICIAN ASSISTANT

## 2017-04-01 PROCEDURE — 1101F PT FALLS ASSESS-DOCD LE1/YR: CPT | Mod: 8P | Performed by: PHYSICIAN ASSISTANT

## 2017-04-01 PROCEDURE — 1036F TOBACCO NON-USER: CPT | Performed by: PHYSICIAN ASSISTANT

## 2017-04-01 PROCEDURE — 99203 OFFICE O/P NEW LOW 30 MIN: CPT | Performed by: PHYSICIAN ASSISTANT

## 2017-04-01 RX ORDER — AMOXICILLIN AND CLAVULANATE POTASSIUM 875; 125 MG/1; MG/1
1 TABLET, FILM COATED ORAL 2 TIMES DAILY
COMMUNITY
End: 2017-08-04

## 2017-04-01 RX ORDER — METRONIDAZOLE 500 MG/1
500 TABLET ORAL 2 TIMES DAILY
Qty: 10 TAB | Refills: 0 | Status: SHIPPED | OUTPATIENT
Start: 2017-04-01 | End: 2017-04-06

## 2017-04-01 RX ORDER — CEFUROXIME AXETIL 250 MG/1
250 TABLET ORAL 2 TIMES DAILY
Qty: 10 TAB | Refills: 0 | Status: SHIPPED | OUTPATIENT
Start: 2017-04-01 | End: 2017-04-06

## 2017-04-01 RX ORDER — FAMCICLOVIR 500 MG/1
500 TABLET ORAL 2 TIMES DAILY
Qty: 10 TAB | Refills: 0 | Status: SHIPPED | OUTPATIENT
Start: 2017-04-01 | End: 2017-04-06

## 2017-04-01 ASSESSMENT — ENCOUNTER SYMPTOMS
RESPIRATORY NEGATIVE: 1
MUSCULOSKELETAL NEGATIVE: 1
DIARRHEA: 1
ABDOMINAL PAIN: 1
CONSTITUTIONAL NEGATIVE: 1
VOMITING: 0
FEVER: 0

## 2017-04-01 NOTE — PROGRESS NOTES
Subjective:      Judit Ramsey is a 86 y.o. female who presents with Cold Sores and Diarrhea            Diarrhea   This is a new problem. The current episode started yesterday (on augm; also mouth sores). The problem occurs 2 to 4 times per day. The problem has been unchanged. The stool consistency is described as watery. Associated symptoms include abdominal pain. Pertinent negatives include no fever or vomiting. Nothing aggravates the symptoms. Risk factors include recent antibiotic use. She has tried nothing for the symptoms. The treatment provided no relief. There is no history of bowel resection, inflammatory bowel disease, irritable bowel syndrome, malabsorption, a recent abdominal surgery or short gut syndrome.       Review of Systems   Constitutional: Negative.  Negative for fever.   HENT: Negative.    Respiratory: Negative.    Gastrointestinal: Positive for abdominal pain and diarrhea. Negative for vomiting.   Genitourinary: Negative.    Musculoskeletal: Negative.    Skin: Negative.           Objective:     /70 mmHg  Pulse 82  Temp(Src) 36.6 °C (97.9 °F)  Resp 16  SpO2 94%     Physical Exam   Constitutional: She is oriented to person, place, and time. She appears well-developed and well-nourished.   HENT:   Head: Normocephalic and atraumatic.   Eyes: EOM are normal. Pupils are equal, round, and reactive to light.   Neck: Normal range of motion. Neck supple.   Lymphadenopathy:     She has no cervical adenopathy.   Neurological: She is alert and oriented to person, place, and time.   Skin: Skin is warm and dry.   Psychiatric: She has a normal mood and affect. Her behavior is normal. Judgment and thought content normal.   Nursing note and vitals reviewed.    Filed Vitals:    04/01/17 1320   BP: 110/70   Pulse: 82   Temp: 36.6 °C (97.9 °F)   Resp: 16   SpO2: 94%     Active Ambulatory Problems     Diagnosis Date Noted   • Esophageal reflux 03/19/2008   • Fatigue 05/03/2011   • HLD (hyperlipidemia)  03/29/2011   • Vertiginous syndrome and labyrinthine disorder 03/19/2008   • Left bundle branch block 03/29/2011   • Osteoarthritis 03/19/2008   • Seizure (CMS-HCC) 09/02/2008   • Subjective visual disturbance 05/03/2011   • Abnormal myocardial perfusion study 04/22/2015   • Atypical chest pain 02/10/2016   • Epiphora due to insufficient drainage of left side 08/03/2016   • Hypertension 08/24/2016     Resolved Ambulatory Problems     Diagnosis Date Noted   • CHF (congestive heart failure) (CMS-HCC) 08/03/2011     Past Medical History   Diagnosis Date   • Unspecified vertiginous syndromes and labyrinthine disorders 3/19/2008   • Subjective visual disturbance, unspecified 5/3/2011   • Incontinence    • Arthritis    • Cancer (CMS-HCC)    • Hiatus hernia syndrome    • Asthma    • Urinary bladder disorder    • Pneumonia    • Breath shortness    • Cataract    • CHEST PAIN 5/3/2011   • Heart burn    • Anesthesia      Current Outpatient Prescriptions on File Prior to Visit   Medication Sig Dispense Refill   • divalproex (DEPAKOTE) 500 MG Tablet Delayed Response Take 1 tablet by mouth two  times daily 180 Tab 1   • oxcarbazepine (TRILEPTAL) 300 MG Tab Take 1 tablet by mouth two  times daily 180 Tab 1   • Ascorbic Acid (VITAMIN C) 100 MG Tab Take  by mouth.     • VITAMIN D, CHOLECALCIFEROL, PO Take  by mouth.     • gabapentin (NEURONTIN) 100 MG Cap Take 100 mg by mouth 2 Times a Day.     • methenamine (MANDELAMINE) 1 GM tablet Take 1 g by mouth 4 times a day.     • pentosan (ELMIRON) 100 MG Cap Take 100 mg by mouth 3 times a day before meals.     • Mirabegron ER (MYRBETRIQ) 50 MG TABLET SR 24 HR Take  by mouth.     • fluticasone-salmeterol (ADVAIR DISKUS) 100-50 MCG/DOSE AEROSOL POWDER, BREATH ACTIVATED Inhale 1 Puff by mouth 2 times a day. Rinse mouth after each use. 3 Inhaler 3   • losartan (COZAAR) 25 MG Tab Take 1 Tab by mouth every day. 90 Tab 3   • atorvastatin (LIPITOR) 40 MG Tab Take 1 Tab by mouth every evening. 90  Tab 3   • carvedilol (COREG) 6.25 MG Tab Take 1 Tab by mouth 2 times a day, with meals. 180 Tab 3   • conjugated estrogen (PREMARIN) 0.625 MG/GM CREA Insert 0.5 g in vagina every day.     • aspirin 81 MG tablet Take 81 mg by mouth every day.     • pentosan (ELMIRON) 100 MG CAPS Take 100 mg by mouth 3 times a day before meals.     • omeprazole (PRILOSEC) 20 MG CPDR Take 20 mg by mouth every day.       • azithromycin (ZITHROMAX) 250 MG Tab Take 2 tablets on day 1, then take 1 tablet a day for 4 days. 6 Tab 2     No current facility-administered medications on file prior to visit.     Gargles, Cepacol lozenges, Aleve/Advil as needed for throat pain  Family History   Problem Relation Age of Onset   • Stroke Mother    • Heart Disease Father      Ciprofloxacin and Prednisone              Assessment/Plan:     ·  diarrh, cold sore        · Only ~4stools /d diarrh, but high risk for c.diff [but will hold on lab unless sx persist 1-3d]; rx flagyl 3d; ceftin rx for UTI; famvir for mouth sores, kenalog in orabase  · If loose stools persist 1-2d, rtc for stool studies to r/o c.diff

## 2017-04-01 NOTE — MR AVS SNAPSHOT
"        Judit Ramsey   2017 12:25 PM   Office Visit   MRN: [id number]    Department:  [facility]   Dept Phone:  [phone]    Description:  Female : 1930   Provider:  Faustino Cabral PA-C           Reason for Visit     Cold Sores x yesterday, cold sores inside upper and lower lip.  Tongue is swollen    Diarrhea x 4 days, diarrhea  \"Currenlty taking augmentin\"      Allergies as of 2017     Allergen Noted Reactions    Ciprofloxacin 2011       SEIZURES.    Prednisone 2016         Vital Signs     Blood Pressure Pulse Temperature Respirations Oxygen Saturation Smoking Status    110/70 mmHg 82 36.6 °C (97.9 °F) 16 94% Never Smoker       Basic Information     Date Of Birth Sex Race Ethnicity Preferred Language    1930 Female White Non- English      Your appointments     2017  1:00 PM   Established Patient Pul with Andria Forde M.D.   [facility] Pulmonary Medicine (--)    [address]  [address]  [facility] [id number] [phone]            2017  1:00 PM   Follow Up Visit with DENZEL Stephens   [facility] Neurology (--)    [address] [facility], [address]  [place] [address] [phone]           You will be receiving a confirmation call a few days before your appointment from our automated call confirmation system.              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   3/19/2008 - Present    Left bundle branch block (Chronic) I44.7   3/29/2011 - Present    Osteoarthritis (Chronic) M19.90   3/19/2008 - Present    Seizure (CMS-HCC) (Chronic) R56.9   2008 - Present    Subjective visual disturbance (Chronic) H53.10   5/3/2011 - Present    Abnormal myocardial perfusion " study    4/22/2015 - Present    Atypical chest pain R07.89   2/10/2016 - Present    Epiphora due to insufficient drainage of left side H04.222   8/3/2016 - Present    Hypertension I10   8/24/2016 - Present      Health Maintenance        Date Due Completion Dates    IMM DTaP/Tdap/Td Vaccine (1 - Tdap) 6/7/1949 ---    PAP SMEAR 6/7/1951 ---    MAMMOGRAM 6/7/1970 ---    COLONOSCOPY 6/7/1980 ---    IMM ZOSTER VACCINE 6/7/1990 ---    BONE DENSITY 6/7/1995 ---    IMM PNEUMOCOCCAL 65+ (ADULT) LOW/MEDIUM RISK SERIES (2 of 2 - PPSV23) 9/25/2016 9/25/2015            Current Immunizations     13-VALENT PCV PREVNAR 9/25/2015    Influenza TIV (IM) 9/28/2016, 10/1/2015, 11/22/2011      Below and/or attached are the medications your provider expects you to take. Review all of your home medications and newly ordered medications with your provider and/or pharmacist. Follow medication instructions as directed by your provider and/or pharmacist. Please keep your medication list with you and share with your provider. Update the information when medications are discontinued, doses are changed, or new medications (including over-the-counter products) are added; and carry medication information at all times in the event of emergency situations     Allergies:  CIPROFLOXACIN - (reactions not documented)     PREDNISONE - (reactions not documented)               Medications  Valid as of: April 01, 2017 -  1:45 PM    Generic Name Brand Name Tablet Size Instructions for use    Amoxicillin-Pot Clavulanate (Tab) AUGMENTIN 875-125 MG Take 1 Tab by mouth 2 times a day.        Ascorbic Acid (Tab) Vitamin C 100 MG Take  by mouth.        Aspirin (Tab) aspirin 81 MG Take 81 mg by mouth every day.        Atorvastatin Calcium (Tab) LIPITOR 40 MG Take 1 Tab by mouth every evening.        Azithromycin (Tab) ZITHROMAX 250 MG Take 2 tablets on day 1, then take 1 tablet a day for 4 days.        Carvedilol (Tab) COREG 6.25 MG Take 1 Tab by mouth 2 times a day,  with meals.        Cholecalciferol   Take  by mouth.        Divalproex Sodium (Tablet Delayed Response) DEPAKOTE 500 MG Take 1 tablet by mouth two  times daily        Estrogens, Conjugated (Cream) PREMARIN 0.625 MG/GM Insert 0.5 g in vagina every day.        Fluticasone-Salmeterol (AEROSOL POWDER, BREATH ACTIVATED) ADVAIR 100-50 MCG/DOSE Inhale 1 Puff by mouth 2 times a day. Rinse mouth after each use.        Gabapentin (Cap) NEURONTIN 100 MG Take 100 mg by mouth 2 Times a Day.        Losartan Potassium (Tab) COZAAR 25 MG Take 1 Tab by mouth every day.        Methenamine Mandelate (Tab) MANDELAMINE 1 GM Take 1 g by mouth 4 times a day.        Mirabegron (TABLET SR 24 HR) Mirabegron ER 50 MG Take  by mouth.        Omeprazole (CAPSULE DELAYED RELEASE) PRILOSEC 20 MG Take 20 mg by mouth every day.          OXcarbazepine (Tab) TRILEPTAL 300 MG Take 1 tablet by mouth two  times daily        Pentosan Polysulfate Sodium (Cap) ELMIRON 100 MG Take 100 mg by mouth 3 times a day before meals.        Pentosan Polysulfate Sodium (Cap) ELMIRON 100 MG Take 100 mg by mouth 3 times a day before meals.        .                 Medicines prescribed today were sent to:     [facility] - [address] [facility] [address]    Phone: [phone] Fax: [phone]    Open 24 Hours?: No    [id number] - [place] - [address] [facility]    [address] [facility] [address]    Phone: [phone] Fax: [phone]    Open 24 Hours?: No      Medication refill instructions:       If your prescription bottle indicates you have medication refills left, it is not necessary to call your provider’s office. Please contact your pharmacy and they will refill your medication.    If your prescription bottle indicates you do not have any refills left, you may request refills at any time through one of the following ways: The online [place] system (except Urgent Care), by  calling your provider’s office, or by asking your pharmacy to contact your provider’s office with a refill request. Medication refills are processed only during regular business hours and may not be available until the next business day. Your provider may request additional information or to have a follow-up visit with you prior to refilling your medication.   *Please Note: Medication refills are assigned a new Rx number when refilled electronically. Your pharmacy may indicate that no refills were authorized even though a new prescription for the same medication is available at the pharmacy. Please request the medicine by name with the pharmacy before contacting your provider for a refill.           MyChart Status: Patient Declined

## 2017-05-02 DIAGNOSIS — G40.909 SEIZURE DISORDER (HCC): ICD-10-CM

## 2017-05-02 NOTE — TELEPHONE ENCOUNTER
Was the patient seen in the last year in this department? Yes  1/18/17    Does patient have an active prescription for medications requested? Yes     Received Request Via: Pharmacy    Next Appointment Scheduled: 7/19/17

## 2017-05-03 RX ORDER — OXCARBAZEPINE 300 MG/1
TABLET, FILM COATED ORAL
Qty: 180 TAB | Refills: 2 | Status: SHIPPED | OUTPATIENT
Start: 2017-05-03 | End: 2017-08-04 | Stop reason: SDUPTHER

## 2017-06-13 DIAGNOSIS — E78.2 MIXED HYPERLIPIDEMIA: ICD-10-CM

## 2017-06-13 DIAGNOSIS — I10 BENIGN ESSENTIAL HTN: ICD-10-CM

## 2017-06-14 RX ORDER — ATORVASTATIN CALCIUM 40 MG/1
TABLET, FILM COATED ORAL
Qty: 90 TAB | Refills: 2 | Status: SHIPPED | OUTPATIENT
Start: 2017-06-14 | End: 2018-03-14 | Stop reason: SDUPTHER

## 2017-06-14 RX ORDER — CARVEDILOL 6.25 MG/1
TABLET ORAL
Qty: 180 TAB | Refills: 2 | Status: SHIPPED | OUTPATIENT
Start: 2017-06-14 | End: 2018-03-14 | Stop reason: SDUPTHER

## 2017-06-17 ENCOUNTER — HOSPITAL ENCOUNTER (INPATIENT)
Dept: HOSPITAL 8 - ED | Age: 82
LOS: 4 days | Discharge: NURSING, REHAB OR LONG-TERM CARE | DRG: 372 | End: 2017-06-21
Attending: INTERNAL MEDICINE | Admitting: INTERNAL MEDICINE
Payer: MEDICARE

## 2017-06-17 VITALS — DIASTOLIC BLOOD PRESSURE: 58 MMHG | SYSTOLIC BLOOD PRESSURE: 99 MMHG

## 2017-06-17 VITALS — HEIGHT: 67 IN | BODY MASS INDEX: 29.31 KG/M2 | WEIGHT: 186.73 LBS

## 2017-06-17 DIAGNOSIS — S39.012A: ICD-10-CM

## 2017-06-17 DIAGNOSIS — Z96.653: ICD-10-CM

## 2017-06-17 DIAGNOSIS — I50.22: ICD-10-CM

## 2017-06-17 DIAGNOSIS — G40.909: ICD-10-CM
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
## 2017-06-17 DIAGNOSIS — E87.1: ICD-10-CM

## 2017-06-17 DIAGNOSIS — E87.6: ICD-10-CM

## 2017-06-17 DIAGNOSIS — E78.5: ICD-10-CM

## 2017-06-17 DIAGNOSIS — I44.7: ICD-10-CM

## 2017-06-17 DIAGNOSIS — K22.4: ICD-10-CM

## 2017-06-17 DIAGNOSIS — E83.42: ICD-10-CM

## 2017-06-17 DIAGNOSIS — H54.7: ICD-10-CM

## 2017-06-17 DIAGNOSIS — Z79.890: ICD-10-CM

## 2017-06-17 DIAGNOSIS — Z90.710: ICD-10-CM

## 2017-06-17 DIAGNOSIS — A04.7: Primary | ICD-10-CM

## 2017-06-17 DIAGNOSIS — M51.36: ICD-10-CM

## 2017-06-17 DIAGNOSIS — E86.1: ICD-10-CM

## 2017-06-17 DIAGNOSIS — Z87.440: ICD-10-CM

## 2017-06-17 DIAGNOSIS — Z88.8: ICD-10-CM

## 2017-06-17 DIAGNOSIS — G89.29: ICD-10-CM

## 2017-06-17 DIAGNOSIS — K21.9: ICD-10-CM

## 2017-06-17 DIAGNOSIS — I11.0: ICD-10-CM

## 2017-06-17 DIAGNOSIS — G89.11: ICD-10-CM

## 2017-06-17 LAB
BUN SERPL-MCNC: 12 MG/DL (ref 7–18)
BUN SERPL-MCNC: 16 MG/DL (ref 7–18)
IS PT STATUS REG ER OR PRE ER?: NO
IS PT STATUS REG ER OR PRE ER?: NO

## 2017-06-17 PROCEDURE — 96360 HYDRATION IV INFUSION INIT: CPT

## 2017-06-17 PROCEDURE — 87186 SC STD MICRODIL/AGAR DIL: CPT

## 2017-06-17 PROCEDURE — 80048 BASIC METABOLIC PNL TOTAL CA: CPT

## 2017-06-17 PROCEDURE — 72110 X-RAY EXAM L-2 SPINE 4/>VWS: CPT

## 2017-06-17 PROCEDURE — 87086 URINE CULTURE/COLONY COUNT: CPT

## 2017-06-17 PROCEDURE — 85025 COMPLETE CBC W/AUTO DIFF WBC: CPT

## 2017-06-17 PROCEDURE — 87077 CULTURE AEROBIC IDENTIFY: CPT

## 2017-06-17 PROCEDURE — 84443 ASSAY THYROID STIM HORMONE: CPT

## 2017-06-17 PROCEDURE — 93306 TTE W/DOPPLER COMPLETE: CPT

## 2017-06-17 PROCEDURE — 84100 ASSAY OF PHOSPHORUS: CPT

## 2017-06-17 PROCEDURE — 81001 URINALYSIS AUTO W/SCOPE: CPT

## 2017-06-17 PROCEDURE — 83735 ASSAY OF MAGNESIUM: CPT

## 2017-06-17 PROCEDURE — 96361 HYDRATE IV INFUSION ADD-ON: CPT

## 2017-06-17 PROCEDURE — 93005 ELECTROCARDIOGRAM TRACING: CPT

## 2017-06-17 PROCEDURE — 82533 TOTAL CORTISOL: CPT

## 2017-06-17 PROCEDURE — 80061 LIPID PANEL: CPT

## 2017-06-17 PROCEDURE — 87324 CLOSTRIDIUM AG IA: CPT

## 2017-06-17 PROCEDURE — 36415 COLL VENOUS BLD VENIPUNCTURE: CPT

## 2017-06-17 PROCEDURE — 84484 ASSAY OF TROPONIN QUANT: CPT

## 2017-06-18 VITALS — SYSTOLIC BLOOD PRESSURE: 128 MMHG | DIASTOLIC BLOOD PRESSURE: 68 MMHG

## 2017-06-18 VITALS — SYSTOLIC BLOOD PRESSURE: 103 MMHG | DIASTOLIC BLOOD PRESSURE: 62 MMHG

## 2017-06-18 VITALS — SYSTOLIC BLOOD PRESSURE: 117 MMHG | DIASTOLIC BLOOD PRESSURE: 70 MMHG

## 2017-06-18 VITALS — DIASTOLIC BLOOD PRESSURE: 64 MMHG | SYSTOLIC BLOOD PRESSURE: 106 MMHG

## 2017-06-18 LAB
BUN SERPL-MCNC: 11 MG/DL (ref 7–18)
IS PT STATUS REG ER OR PRE ER?: NO

## 2017-06-18 RX ADMIN — LOSARTAN POTASSIUM SCH MG: 25 TABLET, FILM COATED ORAL at 09:00

## 2017-06-18 RX ADMIN — BUDESONIDE AND FORMOTEROL FUMARATE DIHYDRATE SCH MG: 160; 4.5 AEROSOL RESPIRATORY (INHALATION) at 09:00

## 2017-06-18 RX ADMIN — ASPIRIN SCH MG: 81 TABLET, COATED ORAL at 09:00

## 2017-06-18 RX ADMIN — FLUTICASONE FUROATE AND VILANTEROL TRIFENATATE SCH PUFF: 100; 25 POWDER RESPIRATORY (INHALATION) at 09:00

## 2017-06-18 RX ADMIN — ATORVASTATIN CALCIUM SCH MG: 80 TABLET, FILM COATED ORAL at 21:14

## 2017-06-18 RX ADMIN — OXCARBAZEPINE SCH MG: 150 TABLET, FILM COATED ORAL at 21:14

## 2017-06-18 RX ADMIN — VITAMIN D, TAB 1000IU (100/BT) SCH UNITS: 25 TAB at 09:00

## 2017-06-18 RX ADMIN — DIVALPROEX SODIUM SCH MG: 500 TABLET, DELAYED RELEASE ORAL at 21:13

## 2017-06-18 RX ADMIN — METHENAMINE HIPPURATE SCH GM: 1 TABLET ORAL at 09:00

## 2017-06-18 RX ADMIN — GABAPENTIN SCH MG: 100 CAPSULE ORAL at 09:00

## 2017-06-18 RX ADMIN — Medication SCH CAP: at 09:00

## 2017-06-18 RX ADMIN — ESTROGENS, CONJUGATED SCH MG: 0.62 TABLET, FILM COATED ORAL at 09:00

## 2017-06-18 RX ADMIN — CARVEDILOL SCH MG: 6.25 TABLET, FILM COATED ORAL at 09:00

## 2017-06-18 RX ADMIN — OMEPRAZOLE SCH MG: 20 CAPSULE, DELAYED RELEASE ORAL at 09:00

## 2017-06-18 RX ADMIN — OMEPRAZOLE SCH MG: 20 CAPSULE, DELAYED RELEASE ORAL at 00:32

## 2017-06-18 RX ADMIN — CARVEDILOL SCH MG: 6.25 TABLET, FILM COATED ORAL at 00:33

## 2017-06-18 RX ADMIN — OMEPRAZOLE SCH MG: 20 CAPSULE, DELAYED RELEASE ORAL at 21:14

## 2017-06-18 RX ADMIN — DIVALPROEX SODIUM SCH MG: 500 TABLET, DELAYED RELEASE ORAL at 09:00

## 2017-06-18 RX ADMIN — ENOXAPARIN SODIUM SCH MG: 40 INJECTION SUBCUTANEOUS at 21:14

## 2017-06-18 RX ADMIN — OXCARBAZEPINE SCH MG: 150 TABLET, FILM COATED ORAL at 00:33

## 2017-06-18 RX ADMIN — OXCARBAZEPINE SCH MG: 150 TABLET, FILM COATED ORAL at 09:00

## 2017-06-18 RX ADMIN — CARVEDILOL SCH MG: 6.25 TABLET, FILM COATED ORAL at 21:13

## 2017-06-18 RX ADMIN — ENOXAPARIN SODIUM SCH MG: 40 INJECTION SUBCUTANEOUS at 00:33

## 2017-06-18 RX ADMIN — DIVALPROEX SODIUM SCH MG: 500 TABLET, DELAYED RELEASE ORAL at 00:32

## 2017-06-18 RX ADMIN — ATORVASTATIN CALCIUM SCH MG: 80 TABLET, FILM COATED ORAL at 00:32

## 2017-06-19 VITALS — DIASTOLIC BLOOD PRESSURE: 69 MMHG | SYSTOLIC BLOOD PRESSURE: 116 MMHG

## 2017-06-19 VITALS — SYSTOLIC BLOOD PRESSURE: 120 MMHG | DIASTOLIC BLOOD PRESSURE: 72 MMHG

## 2017-06-19 VITALS — SYSTOLIC BLOOD PRESSURE: 107 MMHG | DIASTOLIC BLOOD PRESSURE: 64 MMHG

## 2017-06-19 VITALS — SYSTOLIC BLOOD PRESSURE: 135 MMHG | DIASTOLIC BLOOD PRESSURE: 71 MMHG

## 2017-06-19 LAB — BUN SERPL-MCNC: 9 MG/DL (ref 7–18)

## 2017-06-19 RX ADMIN — BUDESONIDE AND FORMOTEROL FUMARATE DIHYDRATE SCH MG: 160; 4.5 AEROSOL RESPIRATORY (INHALATION) at 08:50

## 2017-06-19 RX ADMIN — ASPIRIN SCH MG: 81 TABLET, COATED ORAL at 08:37

## 2017-06-19 RX ADMIN — Medication SCH CAP: at 08:36

## 2017-06-19 RX ADMIN — DIVALPROEX SODIUM SCH MG: 500 TABLET, DELAYED RELEASE ORAL at 08:39

## 2017-06-19 RX ADMIN — DIVALPROEX SODIUM SCH MG: 500 TABLET, DELAYED RELEASE ORAL at 20:21

## 2017-06-19 RX ADMIN — OXCARBAZEPINE SCH MG: 150 TABLET, FILM COATED ORAL at 20:21

## 2017-06-19 RX ADMIN — METHENAMINE HIPPURATE SCH GM: 1 TABLET ORAL at 08:37

## 2017-06-19 RX ADMIN — CARVEDILOL SCH MG: 6.25 TABLET, FILM COATED ORAL at 08:38

## 2017-06-19 RX ADMIN — OMEPRAZOLE SCH MG: 20 CAPSULE, DELAYED RELEASE ORAL at 08:37

## 2017-06-19 RX ADMIN — LOSARTAN POTASSIUM SCH MG: 25 TABLET, FILM COATED ORAL at 08:36

## 2017-06-19 RX ADMIN — VITAMIN D, TAB 1000IU (100/BT) SCH UNITS: 25 TAB at 08:36

## 2017-06-19 RX ADMIN — ESTROGENS, CONJUGATED SCH MG: 0.62 TABLET, FILM COATED ORAL at 08:36

## 2017-06-19 RX ADMIN — GABAPENTIN SCH MG: 100 CAPSULE ORAL at 08:37

## 2017-06-19 RX ADMIN — FLUTICASONE FUROATE AND VILANTEROL TRIFENATATE SCH PUFF: 100; 25 POWDER RESPIRATORY (INHALATION) at 08:35

## 2017-06-19 RX ADMIN — ATORVASTATIN CALCIUM SCH MG: 80 TABLET, FILM COATED ORAL at 20:21

## 2017-06-19 RX ADMIN — CARVEDILOL SCH MG: 6.25 TABLET, FILM COATED ORAL at 20:21

## 2017-06-19 RX ADMIN — OMEPRAZOLE SCH MG: 20 CAPSULE, DELAYED RELEASE ORAL at 20:21

## 2017-06-19 RX ADMIN — OXCARBAZEPINE SCH MG: 150 TABLET, FILM COATED ORAL at 08:36

## 2017-06-19 RX ADMIN — ENOXAPARIN SODIUM SCH MG: 40 INJECTION SUBCUTANEOUS at 20:20

## 2017-06-20 VITALS — DIASTOLIC BLOOD PRESSURE: 63 MMHG | SYSTOLIC BLOOD PRESSURE: 118 MMHG

## 2017-06-20 VITALS — SYSTOLIC BLOOD PRESSURE: 115 MMHG | DIASTOLIC BLOOD PRESSURE: 68 MMHG

## 2017-06-20 VITALS — DIASTOLIC BLOOD PRESSURE: 68 MMHG | SYSTOLIC BLOOD PRESSURE: 116 MMHG

## 2017-06-20 VITALS — SYSTOLIC BLOOD PRESSURE: 118 MMHG | DIASTOLIC BLOOD PRESSURE: 73 MMHG

## 2017-06-20 LAB — BUN SERPL-MCNC: 11 MG/DL (ref 7–18)

## 2017-06-20 RX ADMIN — ENOXAPARIN SODIUM SCH MG: 40 INJECTION SUBCUTANEOUS at 20:22

## 2017-06-20 RX ADMIN — DIVALPROEX SODIUM SCH MG: 500 TABLET, DELAYED RELEASE ORAL at 20:21

## 2017-06-20 RX ADMIN — GABAPENTIN SCH MG: 100 CAPSULE ORAL at 09:20

## 2017-06-20 RX ADMIN — OMEPRAZOLE SCH MG: 20 CAPSULE, DELAYED RELEASE ORAL at 20:21

## 2017-06-20 RX ADMIN — FLUTICASONE FUROATE AND VILANTEROL TRIFENATATE SCH PUFF: 100; 25 POWDER RESPIRATORY (INHALATION) at 09:17

## 2017-06-20 RX ADMIN — DIVALPROEX SODIUM SCH MG: 500 TABLET, DELAYED RELEASE ORAL at 09:18

## 2017-06-20 RX ADMIN — SODIUM CHLORIDE TAB 1 GM SCH GM: 1 TAB at 17:15

## 2017-06-20 RX ADMIN — CARVEDILOL SCH MG: 6.25 TABLET, FILM COATED ORAL at 09:18

## 2017-06-20 RX ADMIN — OXCARBAZEPINE SCH MG: 150 TABLET, FILM COATED ORAL at 09:18

## 2017-06-20 RX ADMIN — METHENAMINE HIPPURATE SCH GM: 1 TABLET ORAL at 09:20

## 2017-06-20 RX ADMIN — LOSARTAN POTASSIUM SCH MG: 25 TABLET, FILM COATED ORAL at 09:18

## 2017-06-20 RX ADMIN — Medication SCH CAP: at 09:18

## 2017-06-20 RX ADMIN — ASPIRIN SCH MG: 81 TABLET, COATED ORAL at 09:20

## 2017-06-20 RX ADMIN — ESTROGENS, CONJUGATED SCH MG: 0.62 TABLET, FILM COATED ORAL at 09:17

## 2017-06-20 RX ADMIN — VITAMIN D, TAB 1000IU (100/BT) SCH UNITS: 25 TAB at 09:21

## 2017-06-20 RX ADMIN — SODIUM CHLORIDE TAB 1 GM SCH GM: 1 TAB at 10:16

## 2017-06-20 RX ADMIN — CARVEDILOL SCH MG: 6.25 TABLET, FILM COATED ORAL at 20:21

## 2017-06-20 RX ADMIN — BUDESONIDE AND FORMOTEROL FUMARATE DIHYDRATE SCH MG: 160; 4.5 AEROSOL RESPIRATORY (INHALATION) at 09:00

## 2017-06-20 RX ADMIN — SODIUM CHLORIDE TAB 1 GM SCH GM: 1 TAB at 20:20

## 2017-06-20 RX ADMIN — OMEPRAZOLE SCH MG: 20 CAPSULE, DELAYED RELEASE ORAL at 09:17

## 2017-06-20 RX ADMIN — OXCARBAZEPINE SCH MG: 150 TABLET, FILM COATED ORAL at 20:21

## 2017-06-20 RX ADMIN — ATORVASTATIN CALCIUM SCH MG: 80 TABLET, FILM COATED ORAL at 20:21

## 2017-06-21 VITALS — SYSTOLIC BLOOD PRESSURE: 136 MMHG | DIASTOLIC BLOOD PRESSURE: 76 MMHG

## 2017-06-21 VITALS — SYSTOLIC BLOOD PRESSURE: 106 MMHG | DIASTOLIC BLOOD PRESSURE: 66 MMHG

## 2017-06-21 VITALS — DIASTOLIC BLOOD PRESSURE: 66 MMHG | SYSTOLIC BLOOD PRESSURE: 108 MMHG

## 2017-06-21 LAB — BUN SERPL-MCNC: 10 MG/DL (ref 7–18)

## 2017-06-21 RX ADMIN — FLUTICASONE FUROATE AND VILANTEROL TRIFENATATE SCH PUFF: 100; 25 POWDER RESPIRATORY (INHALATION) at 09:34

## 2017-06-21 RX ADMIN — ASPIRIN SCH MG: 81 TABLET, COATED ORAL at 09:37

## 2017-06-21 RX ADMIN — LOSARTAN POTASSIUM SCH MG: 25 TABLET, FILM COATED ORAL at 09:35

## 2017-06-21 RX ADMIN — DIVALPROEX SODIUM SCH MG: 500 TABLET, DELAYED RELEASE ORAL at 09:36

## 2017-06-21 RX ADMIN — CARVEDILOL SCH MG: 6.25 TABLET, FILM COATED ORAL at 09:38

## 2017-06-21 RX ADMIN — SODIUM CHLORIDE TAB 1 GM SCH GM: 1 TAB at 15:14

## 2017-06-21 RX ADMIN — VITAMIN D, TAB 1000IU (100/BT) SCH UNITS: 25 TAB at 09:37

## 2017-06-21 RX ADMIN — OMEPRAZOLE SCH MG: 20 CAPSULE, DELAYED RELEASE ORAL at 09:38

## 2017-06-21 RX ADMIN — Medication SCH CAP: at 09:39

## 2017-06-21 RX ADMIN — OXCARBAZEPINE SCH MG: 150 TABLET, FILM COATED ORAL at 09:35

## 2017-06-21 RX ADMIN — BUDESONIDE AND FORMOTEROL FUMARATE DIHYDRATE SCH MG: 160; 4.5 AEROSOL RESPIRATORY (INHALATION) at 09:00

## 2017-06-21 RX ADMIN — SODIUM CHLORIDE TAB 1 GM SCH GM: 1 TAB at 09:38

## 2017-06-21 RX ADMIN — GABAPENTIN SCH MG: 100 CAPSULE ORAL at 09:35

## 2017-06-21 RX ADMIN — ESTROGENS, CONJUGATED SCH MG: 0.62 TABLET, FILM COATED ORAL at 09:39

## 2017-06-21 RX ADMIN — METHENAMINE HIPPURATE SCH GM: 1 TABLET ORAL at 09:41

## 2017-07-17 ENCOUNTER — APPOINTMENT (OUTPATIENT)
Dept: PULMONOLOGY | Facility: HOSPICE | Age: 82
End: 2017-07-17
Payer: MEDICARE

## 2017-08-04 ENCOUNTER — TELEPHONE (OUTPATIENT)
Dept: NEUROLOGY | Facility: MEDICAL CENTER | Age: 82
End: 2017-08-04

## 2017-08-04 ENCOUNTER — OFFICE VISIT (OUTPATIENT)
Dept: NEUROLOGY | Facility: MEDICAL CENTER | Age: 82
End: 2017-08-04
Payer: MEDICARE

## 2017-08-04 VITALS
DIASTOLIC BLOOD PRESSURE: 78 MMHG | WEIGHT: 184.2 LBS | HEART RATE: 80 BPM | BODY MASS INDEX: 28.91 KG/M2 | RESPIRATION RATE: 16 BRPM | HEIGHT: 67 IN | TEMPERATURE: 98.1 F | OXYGEN SATURATION: 94 % | SYSTOLIC BLOOD PRESSURE: 120 MMHG
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
## 2017-08-04 DIAGNOSIS — R53.83 LETHARGY: ICD-10-CM

## 2017-08-04 DIAGNOSIS — G40.909 SEIZURE DISORDER (HCC): ICD-10-CM

## 2017-08-04 PROCEDURE — 99215 OFFICE O/P EST HI 40 MIN: CPT | Performed by: NURSE PRACTITIONER

## 2017-08-04 RX ORDER — NITROGLYCERIN 80 MG/1
1 PATCH TRANSDERMAL DAILY
COMMUNITY
End: 2018-02-21

## 2017-08-04 RX ORDER — DIVALPROEX SODIUM 500 MG/1
1000 TABLET, EXTENDED RELEASE ORAL
Qty: 60 TAB | Refills: 0 | Status: SHIPPED | OUTPATIENT
Start: 2017-08-04 | End: 2018-02-21 | Stop reason: SDUPTHER

## 2017-08-04 RX ORDER — DIVALPROEX SODIUM 500 MG/1
1000 TABLET, EXTENDED RELEASE ORAL
Qty: 60 TAB | Refills: 5 | Status: SHIPPED | OUTPATIENT
Start: 2017-08-04 | End: 2017-08-04 | Stop reason: SDUPTHER

## 2017-08-04 RX ORDER — OXCARBAZEPINE 300 MG/1
TABLET, FILM COATED ORAL
Qty: 60 TAB | Refills: 5 | Status: SHIPPED | OUTPATIENT
Start: 2017-08-04 | End: 2018-02-21 | Stop reason: SDUPTHER

## 2017-08-04 ASSESSMENT — PATIENT HEALTH QUESTIONNAIRE - PHQ9: CLINICAL INTERPRETATION OF PHQ2 SCORE: 0

## 2017-08-04 NOTE — MR AVS SNAPSHOT
"Judit Ramsey   2017 10:20 AM   Office Visit   MRN: [id number]    Department:  [facility]   Dept Phone:  [phone]    Description:  Female : 1930   Provider:  DENZEL Stephens           Reason for Visit     Follow-Up Seizure (CMS-HCC)      Allergies as of 2017     Allergen Noted Reactions    Ciprofloxacin 2011       SEIZURES.    Prednisone 2016         You were diagnosed with     Lethargy   [892043]       Seizure disorder (CMS-[facility])   [501026]         Vital Signs     Blood Pressure Pulse Temperature Respirations Height Weight    120/78 mmHg 80 36.7 °C (98.1 °F) 16 1.702 m (5' 7.01\") 83.553 kg (184 lb 3.2 oz)    Body Mass Index Oxygen Saturation Smoking Status             28.84 kg/m2 94% Never Smoker          Basic Information     Date Of Birth Sex Race Ethnicity Preferred Language    1930 Female White Non- English      Your appointments     2017 10:40 AM   Follow Up Visit with DENZEL Stephens   [facility] Neurology (--)    [address] [facility] [address]  [facility] [address] [phone]           You will be receiving a confirmation call a few days before your appointment from our automated call confirmation system.            2017  1:40 PM   FOLLOW UP with Eleazar Wynn M.D.   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   3/19/2008 - Present    Left bundle branch block (Chronic) I44.7   3/29/2011 - Present    Osteoarthritis (Chronic) M19.90   3/19/2008 - Present    Seizure (CMS-HCC) (Chronic) R56.9   2008 - Present   " Subjective visual disturbance (Chronic) H53.10   5/3/2011 - Present    Abnormal myocardial perfusion study    4/22/2015 - Present    Atypical chest pain R07.89   2/10/2016 - Present    Epiphora due to insufficient drainage of left side H04.222   8/3/2016 - Present    Hypertension I10   8/24/2016 - Present      Health Maintenance        Date Due Completion Dates    IMM DTaP/Tdap/Td Vaccine (1 - Tdap) 6/7/1949 ---    PAP SMEAR 6/7/1951 ---    MAMMOGRAM 6/7/1970 ---    COLONOSCOPY 6/7/1980 ---    IMM ZOSTER VACCINE 6/7/1990 ---    BONE DENSITY 6/7/1995 ---    IMM PNEUMOCOCCAL 65+ (ADULT) LOW/MEDIUM RISK SERIES (2 of 2 - PPSV23) 9/25/2016 9/25/2015    IMM INFLUENZA (1) 9/1/2017 9/28/2016, 10/1/2015, 11/22/2011            Current Immunizations     13-VALENT PCV PREVNAR 9/25/2015    Influenza TIV (IM) 9/28/2016, 10/1/2015, 11/22/2011      Below and/or attached are the medications your provider expects you to take. Review all of your home medications and newly ordered medications with your provider and/or pharmacist. Follow medication instructions as directed by your provider and/or pharmacist. Please keep your medication list with you and share with your provider. Update the information when medications are discontinued, doses are changed, or new medications (including over-the-counter products) are added; and carry medication information at all times in the event of emergency situations     Allergies:  CIPROFLOXACIN - (reactions not documented)     PREDNISONE - (reactions not documented)               Medications  Valid as of: August 04, 2017 - 11:07 AM    Generic Name Brand Name Tablet Size Instructions for use    Ascorbic Acid (Tab) Vitamin C 100 MG Take  by mouth.        Aspirin (Tab) aspirin 81 MG Take 81 mg by mouth every day.        Atorvastatin Calcium (Tab) LIPITOR 40 MG Take 1 tablet by mouth  every evening        Carvedilol (Tab) COREG 6.25 MG Take 1 tablet by mouth two  times daily with meals         Cholecalciferol   Take  by mouth.        Divalproex Sodium (Tablet Delayed Response) DEPAKOTE 500 MG Take 1 tablet by mouth two  times daily        Divalproex Sodium (TABLET SR 24 HR) DEPAKOTE  MG Take 2 Tabs by mouth every bedtime.        Fluticasone-Salmeterol (AEROSOL POWDER, BREATH ACTIVATED) ADVAIR 100-50 MCG/DOSE Inhale 1 Puff by mouth 2 times a day. Rinse mouth after each use.        Gabapentin (Cap) NEURONTIN 100 MG Take 100 mg by mouth 2 Times a Day.        Methenamine Mandelate (Tab) MANDELAMINE 1 GM Take 1 g by mouth 4 times a day.        Mirabegron (TABLET SR 24 HR) Mirabegron ER 50 MG Take  by mouth.        Nitroglycerin (PATCH 24 HR) NITRODUR 0.4 MG/HR Apply 1 Patch to skin as directed every day.        Omega-3 Fatty Acids   Take  by mouth.        Omeprazole (CAPSULE DELAYED RELEASE) PRILOSEC 20 MG Take 20 mg by mouth every day.          OXcarbazepine (Tab) TRILEPTAL 300 MG Take 1 tablet by mouth two  times daily        Pentosan Polysulfate Sodium (Cap) ELMIRON 100 MG Take 100 mg by mouth 3 times a day before meals.        Probiotic Product   Take  by mouth.        .                 Medicines prescribed today were sent to:     [id number] - [address] [facility] [address]    Phone: [phone] Fax: [phone]    Open 24 Hours?: No    [facility] [address] [place] [address] [place] - [address] [facility]    [address] [place] [address]    Phone: [phone] Fax: [phone]    Open 24 Hours?: No      Medication refill instructions:       If your prescription bottle indicates you have medication refills left, it is not necessary to call your provider’s office. Please contact your pharmacy and they will refill your medication.    If your prescription bottle indicates you do not have any refills left, you may request refills at any time through one of the following ways: The online [employer] system (except Urgent Care), by calling your  provider’s office, or by asking your pharmacy to contact your provider’s office with a refill request. Medication refills are processed only during regular business hours and may not be available until the next business day. Your provider may request additional information or to have a follow-up visit with you prior to refilling your medication.   *Please Note: Medication refills are assigned a new Rx number when refilled electronically. Your pharmacy may indicate that no refills were authorized even though a new prescription for the same medication is available at the pharmacy. Please request the medicine by name with the pharmacy before contacting your provider for a refill.        Your To Do List     Future Labs/Procedures Complete By Expires    COMP METABOLIC PANEL  As directed 8/5/2018    VITAMIN B12  As directed 8/4/2018    VITAMIN D,25 HYDROXY  As directed 8/4/2018         [employer] Access Code: [email or web address]  Expires: 9/3/2017 11:07 AM    Your email address is not on file at [facility].  Email Addresses are required for you to sign up for [employer], please contact [phone] to verify your personal information and to provide your email address prior to attempting to register for [employer].    Judit Ramsey  [facility] [place] [address]    [employer] medic  A secure, online tool to manage your health information     [facility]’s S[employer] medic® is a secure, online tool that connects you to your personalized health information from the privacy of your home -- day or night - making it very easy for you to manage your healthcare. Once the activation process is completed, you can even access your medical information using the [employer] medic lisa, which is available for free in the Apple Lisa store or Google Play store.     To learn more about [employer] medic, visit [email or web address]    There are two levels of access available (as shown below):   My Chart Features  [facility] Primary Care Doctor [facility]  Specialists  [facility]  Urgent  Care Non-[facility] Primary Care Doctor   Email your healthcare team securely and privately 24/7 X X X    Manage appointments: schedule your next appointment; view details of past/upcoming appointments X      Request prescription refills. X      View recent personal medical records, including lab and immunizations X X X X   View health record, including health history, allergies, medications X X X X   Read reports about your outpatient visits, procedures, consult and ER notes X X X X   See your discharge summary, which is a recap of your hospital and/or ER visit that includes your diagnosis, lab results, and care plan X X  X     How to register for [employer]:  Once your e-mail address has been verified, follow the following steps to sign up for [employer].     1. Go to  [email or web address]  2. Click on the Sign Up Now box, which takes you to the New Member Sign Up page. You will need to provide the following information:  a. Enter your [employer] Access Code exactly as it appears at the top of this page. (You will not need to use this code after you’ve completed the sign-up process. If you do not sign up before the expiration date, you must request a new code.)   b. Enter your date of birth.   c. Enter your home email address.   d. Click Submit, and follow the next screen’s instructions.  3. Create a [employer] ID. This will be your [employer] login ID and cannot be changed, so think of one that is secure and easy to remember.  4. Create a [employer] password. You can change your password at any time.  5. Enter your Password Reset Question and Answer. This can be used at a later time if you forget your password.   6. Enter your e-mail address. This allows you to receive e-mail notifications when new information is available in [employer].  7. Click Sign Up. You can now view your health information.    For assistance activating your [employer] account, call [phone]

## 2017-08-04 NOTE — PROGRESS NOTES
Subjective:      Judit Ramsey is a 87 y.o. female who presents with Follow-Up for Partial Seizure Disorder.    Here with her  and daughter today.        HPI  Last seen January 2017.  A few months ago, she was admitted to [facility] for c-diff and a ground level fall.  She had a lengthy stay in the hospital and then was transferred to [facility].  Now is back at home with her  and they are living independently.    She has had repeat labs collected as she has had low sodium levels.  Has been started on a sodium tablet three times per day and is drinking Gatorade.    She is quite lethargic according to her daughter.  She is feeling improved with adding more salty intake in her diet in the last week.  Two weeks ago her serum sodium was approx 129mcg/ml.      Just had a fall this morning.  She took her  down with her and he is bandaged on his forehead.  She states that she is just fine.    Daughter is concerned about polypharmacy.  She is concerned that Judit is taking 3 anti-seizure medications.  GBP has been prescribed very low dose, to treat the stomach pains thought to be neuropathic in nature which has greatly helped reduce her neuropathy.  She also has expressed significant pains in the lower extremities which are now improved.    Current Outpatient Prescriptions   Medication Sig Dispense Refill   • Probiotic Product (PROBIOTIC DAILY PO) Take  by mouth.     • nitroglycerin (NITRODUR) 0.4 MG/HR PATCH 24 HR Apply 1 Patch to skin as directed every day.     • Omega-3 Fatty Acids (OMEGA 3 PO) Take  by mouth.     • atorvastatin (LIPITOR) 40 MG Tab Take 1 tablet by mouth  every evening 90 Tab 2   • carvedilol (COREG) 6.25 MG Tab Take 1 tablet by mouth two  times daily with meals 180 Tab 2   • oxcarbazepine (TRILEPTAL) 300 MG Tab Take 1 tablet by mouth two  times daily 180 Tab 2   • divalproex (DEPAKOTE) 500 MG Tablet Delayed Response Take 1 tablet by mouth two  times daily 180 Tab 1   • Ascorbic  "Acid (VITAMIN C) 100 MG Tab Take  by mouth.     • VITAMIN D, CHOLECALCIFEROL, PO Take  by mouth.     • gabapentin (NEURONTIN) 100 MG Cap Take 100 mg by mouth 2 Times a Day.     • methenamine (MANDELAMINE) 1 GM tablet Take 1 g by mouth 4 times a day.     • pentosan (ELMIRON) 100 MG Cap Take 100 mg by mouth 3 times a day before meals.     • Mirabegron ER (MYRBETRIQ) 50 MG TABLET SR 24 HR Take  by mouth.     • fluticasone-salmeterol (ADVAIR DISKUS) 100-50 MCG/DOSE AEROSOL POWDER, BREATH ACTIVATED Inhale 1 Puff by mouth 2 times a day. Rinse mouth after each use. 3 Inhaler 3   • aspirin 81 MG tablet Take 81 mg by mouth every day.     • omeprazole (PRILOSEC) 20 MG CPDR Take 20 mg by mouth every day.         No current facility-administered medications for this visit.       Review of Systems   Constitutional: Positive for weight loss and malaise/fatigue.   HENT: Positive for hearing loss (chronic). Negative for nosebleeds and sore throat.         No recent head injury.   Eyes: Negative for double vision.        No new loss of vision.   Respiratory: Negative for cough.         No recent lung infections.   Cardiovascular: Positive for leg swelling (mild bilaterally). Negative for chest pain.   Gastrointestinal: Negative for nausea, vomiting, abdominal pain and diarrhea.   Genitourinary: Negative.    Musculoskeletal: Positive for myalgias, joint pain and falls.   Skin: Negative.    Neurological: Negative for dizziness, speech change, focal weakness, seizures and headaches.   Endo/Heme/Allergies:        No history of endocrine dysfunction.  No new problems.   Psychiatric/Behavioral: Positive for depression (possible). The patient is not nervous/anxious.         No recent mood changes.          Objective:     /78 mmHg  Pulse 80  Temp(Src) 36.7 °C (98.1 °F)  Resp 16  Ht 1.702 m (5' 7.01\")  Wt 83.553 kg (184 lb 3.2 oz)  BMI 28.84 kg/m2  SpO2 94%     Physical Exam   Constitutional: She is oriented to person, place, " and time. She appears well-developed and well-nourished. No distress.   HENT:   Head: Normocephalic.   No apparent injury but had a fall this morning.   Eyes: EOM are normal.   Neck: Normal range of motion.   Cardiovascular: Normal rate and regular rhythm.    Pulmonary/Chest: Effort normal.   Neurological: She is alert and oriented to person, place, and time. She exhibits normal muscle tone. Coordination and gait (using a walker, walks in a forward bent posture but will stand up straight when asked) abnormal.   No observable changes in neurologic status.  See initial new patient examination for details.    Skin: Skin is warm. There is pallor.   Psychiatric: She is slowed.   Quiet but appropriately answers questions.           Assessment/Plan:     Seizure disorder:  Last known seizure was 4/2010.    Continue VPA 500mg BID with transition to VPA ER 1000mg qhs and OXC 300mg BID.    Continue supplements as recommended.    Peripheral neuropathy:  Ongoing and improved with the start of GBP 100mg BID.    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  Now using walker.  Has had falls about one time every 2-3 months.  Just fell this morning and took her  down with her.    I am very concerned for Judit's safety and well-being.  It was moderately difficult discussing topics of concern with their daughter.    She will continue with Occupational Health.  The decline in the health of both Judit and her  show a need for 24/7 care.  I provided information for the [facility].    Obtain labs within the next 2 weeks.  We discussed the possibility of tapering off of the OXC due to the hypernatremia and adding a new AED.  She developed status epilepticus when only on Depakote.  I ask for them to consider monitoring during that process.    Return for follow-up in 3 months.       I spent 40+ minutes with this patient, over fifty percent was spent counseling patient on their  condition, best management practices, reviewing test results and risks and benefits of treatment.

## 2017-08-10 ASSESSMENT — ENCOUNTER SYMPTOMS
ABDOMINAL PAIN: 0
DIARRHEA: 0
HEADACHES: 0
VOMITING: 0
MYALGIAS: 1
SPEECH CHANGE: 0
DOUBLE VISION: 0
DEPRESSION: 1
NERVOUS/ANXIOUS: 0
SORE THROAT: 0
SEIZURES: 0
COUGH: 0
FOCAL WEAKNESS: 0
NAUSEA: 0
WEIGHT LOSS: 1
DIZZINESS: 0
FALLS: 1

## 2017-08-14 DIAGNOSIS — I10 ESSENTIAL HYPERTENSION: ICD-10-CM

## 2017-08-15 RX ORDER — LOSARTAN POTASSIUM 25 MG/1
TABLET ORAL
Qty: 90 TAB | Refills: 3 | Status: SHIPPED | OUTPATIENT
Start: 2017-08-15 | End: 2018-03-14

## 2017-08-25 LAB
25(OH)D3+25(OH)D2 SERPL-MCNC: 57.4 NG/ML (ref 30–100)
ALBUMIN SERPL-MCNC: 4.4 G/DL (ref 3.5–4.7)
ALBUMIN/GLOB SERPL: 2.1 {RATIO} (ref 1.2–2.2)
ALP SERPL-CCNC: 61 IU/L (ref 39–117)
ALT SERPL-CCNC: 15 IU/L (ref 0–32)
AMMONIA PLAS-MCNC: 40 UG/DL (ref 19–87)
AST SERPL-CCNC: 14 IU/L (ref 0–40)
BILIRUB SERPL-MCNC: 0.7 MG/DL (ref 0–1.2)
BUN SERPL-MCNC: 10 MG/DL (ref 8–27)
BUN/CREAT SERPL: 16 (ref 12–28)
CALCIUM SERPL-MCNC: 9.8 MG/DL (ref 8.7–10.3)
CHLORIDE SERPL-SCNC: 91 MMOL/L (ref 96–106)
CO2 SERPL-SCNC: 23 MMOL/L (ref 18–29)
CREAT SERPL-MCNC: 0.62 MG/DL (ref 0.57–1)
GLOBULIN SER CALC-MCNC: 2.1 G/DL (ref 1.5–4.5)
GLUCOSE SERPL-MCNC: 109 MG/DL (ref 65–99)
OXCARBAZEPINE SERPL-MCNC: 5 UG/ML (ref 10–35)
POTASSIUM SERPL-SCNC: 4.1 MMOL/L (ref 3.5–5.2)
PROT SERPL-MCNC: 6.5 G/DL (ref 6–8.5)
SODIUM SERPL-SCNC: 133 MMOL/L (ref 134–144)
VALPROATE SERPL-MCNC: 30 UG/ML (ref 50–100)
VIT B12 SERPL-MCNC: 450 PG/ML (ref 211–946)

## 2017-08-28 ENCOUNTER — TELEPHONE (OUTPATIENT)
Dept: NEUROLOGY | Facility: MEDICAL CENTER | Age: 82
End: 2017-08-28

## 2017-08-28 NOTE — TELEPHONE ENCOUNTER
Please call to see how Judit is doing.      Her labs reveal an elevated blood sugar level and low anti-seizure medication levels.      1) Should f/u with PCP regarding general labs.  2) Needs to have double or triple checks that she is taking her medication routinely.

## 2017-08-29 NOTE — TELEPHONE ENCOUNTER
Spoke with patient today, she reports that she has been feeling fine. No problems that she can think of. She understood and will follow up with her PCP regarding her general labs. Patient also reports that yes, she has been taking her seizure medications routinely.

## 2017-09-14 ENCOUNTER — HOSPITAL ENCOUNTER (OUTPATIENT)
Dept: HOSPITAL 8 - CFH | Age: 82
Discharge: HOME | End: 2017-09-14
Attending: PHYSICAL MEDICINE & REHABILITATION
Payer: MEDICARE

## 2017-09-14 DIAGNOSIS — M41.86: ICD-10-CM

## 2017-09-14 DIAGNOSIS — M47.896: ICD-10-CM

## 2017-09-14 DIAGNOSIS — M43.16: ICD-10-CM

## 2017-09-14 DIAGNOSIS — Z85.3: ICD-10-CM

## 2017-09-14 DIAGNOSIS — M51.36: Primary | ICD-10-CM

## 2017-09-14 PROCEDURE — 72131 CT LUMBAR SPINE W/O DYE: CPT

## 2017-11-17 ENCOUNTER — APPOINTMENT (OUTPATIENT)
Dept: NEUROLOGY | Facility: MEDICAL CENTER | Age: 82
End: 2017-11-17
Payer: MEDICARE

## 2017-12-28 ENCOUNTER — HOSPITAL ENCOUNTER (OUTPATIENT)
Dept: HOSPITAL 8 - CFH | Age: 82
Discharge: HOME | End: 2017-12-28
Attending: FAMILY MEDICINE
Payer: MEDICARE

## 2017-12-28 DIAGNOSIS — M19.012: ICD-10-CM

## 2017-12-28 DIAGNOSIS — J84.10: Primary | ICD-10-CM

## 2017-12-28 DIAGNOSIS — M47.814: ICD-10-CM

## 2017-12-28 DIAGNOSIS — M19.011: ICD-10-CM

## 2017-12-28 PROCEDURE — 71020: CPT

## 2018-01-03 ENCOUNTER — OFFICE VISIT (OUTPATIENT)
Dept: CARDIOLOGY | Facility: MEDICAL CENTER | Age: 83
End: 2018-01-03
Payer: MEDICARE

## 2018-01-03 VITALS
SYSTOLIC BLOOD PRESSURE: 110 MMHG | DIASTOLIC BLOOD PRESSURE: 70 MMHG | WEIGHT: 186 LBS | BODY MASS INDEX: 29.19 KG/M2 | HEART RATE: 76 BPM | HEIGHT: 67 IN | OXYGEN SATURATION: 95 %

## 2018-01-03 DIAGNOSIS — I44.7 LEFT BUNDLE BRANCH BLOCK: Chronic | ICD-10-CM

## 2018-01-03 DIAGNOSIS — I10 ESSENTIAL HYPERTENSION: ICD-10-CM

## 2018-01-03 PROCEDURE — 99213 OFFICE O/P EST LOW 20 MIN: CPT | Performed by: INTERNAL MEDICINE

## 2018-01-04 NOTE — PROGRESS NOTES
"Subjective:   Judit Ramsey is a 87 y.o. female who presents today For follow-up of hypertension and chronic left bundle branch block. She has had no syncope or presyncope. The patient uses a walker and is not very physically active. She had a fall on her stairs at home but did not injure herself.    Past Medical History:   Diagnosis Date   • Anesthesia     Pt and spouse state, anesthesia causes her seizures, she has had several seizures at least 4 in PACU, following several different surgeries.  She has had colonoscopies and cataract surgery and did fine.   • Arthritis     bilateral knees   • Asthma    • Breath shortness     02 @ 2L con't   • Cancer (CMS-HCC)     skin cancer   • Cataract     bilateral IOL   • CHEST PAIN 5/3/2011   • CHF (congestive heart failure) (CMS-HCC) 8/3/2011   • Esophageal reflux 3/19/2008   • Fatigue 5/3/2011   • Heart burn     \"chronic pain in my stomach\"   • Hiatus hernia syndrome    • HLD (hyperlipidemia) 3/29/2011   • Hypertension    • Incontinence    • Left bundle branch block 3/29/2011   • Osteoarthritis 3/19/2008   • Pneumonia     2010   • Seizure (CMS-HCC) 9/2/2008    Pt states some anesthesia causes seizures, last seizure 4 years ago   • Subjective visual disturbance, unspecified 5/3/2011   • Unspecified vertiginous syndromes and labyrinthine disorders 3/19/2008   • Urinary bladder disorder      Past Surgical History:   Procedure Laterality Date   • ECTROPIAN REPAIR Left 8/3/2016    Procedure: ECTROPIAN REPAIR - LOWER LID WITH LATERAL TARSAL STRIP;  Surgeon: Daniel Pimentel M.D.;  Location: SURGERY SURGICAL [facility];  Service:    • KNEE REPLACEMENT, TOTAL  2010    BILATERAL   • ABDOMINAL HYSTERECTOMY TOTAL     • CHOLECYSTECTOMY     • GENERAL LUNG SURGERY      LUNG SEGMENTAL RESECTION.     Family History   Problem Relation Age of Onset   • Stroke Mother    • Heart Disease Father      History   Smoking Status   • Never Smoker   Smokeless Tobacco   • Never Used     Allergies " "  Allergen Reactions   • Ciprofloxacin      SEIZURES.   • Prednisone      Outpatient Encounter Prescriptions as of 1/3/2018   Medication Sig Dispense Refill   • losartan (COZAAR) 25 MG Tab Take 1 tablet by mouth  every day 90 Tab 3   • Probiotic Product (PROBIOTIC DAILY PO) Take  by mouth.     • nitroglycerin (NITRODUR) 0.4 MG/HR PATCH 24 HR Apply 1 Patch to skin as directed every day.     • Omega-3 Fatty Acids (OMEGA 3 PO) Take  by mouth.     • oxcarbazepine (TRILEPTAL) 300 MG Tab Take 1 tablet by mouth two  times daily 60 Tab 5   • divalproex ER (DEPAKOTE ER) 500 MG TABLET SR 24 HR Take 2 Tabs by mouth every bedtime. 60 Tab 0   • atorvastatin (LIPITOR) 40 MG Tab Take 1 tablet by mouth  every evening 90 Tab 2   • carvedilol (COREG) 6.25 MG Tab Take 1 tablet by mouth two  times daily with meals 180 Tab 2   • divalproex (DEPAKOTE) 500 MG Tablet Delayed Response Take 1 tablet by mouth two  times daily 180 Tab 1   • Ascorbic Acid (VITAMIN C) 100 MG Tab Take  by mouth.     • VITAMIN D, CHOLECALCIFEROL, PO Take  by mouth.     • gabapentin (NEURONTIN) 100 MG Cap Take 100 mg by mouth 2 Times a Day.     • methenamine (MANDELAMINE) 1 GM tablet Take 1 g by mouth 4 times a day.     • pentosan (ELMIRON) 100 MG Cap Take 100 mg by mouth 3 times a day before meals.     • Mirabegron ER (MYRBETRIQ) 50 MG TABLET SR 24 HR Take  by mouth.     • fluticasone-salmeterol (ADVAIR DISKUS) 100-50 MCG/DOSE AEROSOL POWDER, BREATH ACTIVATED Inhale 1 Puff by mouth 2 times a day. Rinse mouth after each use. 3 Inhaler 3   • aspirin 81 MG tablet Take 81 mg by mouth every day.     • omeprazole (PRILOSEC) 20 MG CPDR Take 20 mg by mouth every day.         No facility-administered encounter medications on file as of 1/3/2018.      ROS     Objective:   /70   Pulse 76   Ht 1.702 m (5' 7.01\")   Wt 84.4 kg (186 lb)   SpO2 95%   BMI 29.12 kg/m²     Physical Exam    Assessment:     1. Essential hypertension     2. Left bundle branch block   "       Medical Decision Making:  Today's Assessment / Status / Plan:   Her blood pressure is under good control. She should avoid any negatively chronotropic agents. Continue same medications. Home safety discussed. Return in one year

## 2018-01-18 ENCOUNTER — OFFICE VISIT (OUTPATIENT)
Dept: PULMONOLOGY | Facility: HOSPICE | Age: 83
End: 2018-01-18
Payer: MEDICARE

## 2018-01-18 VITALS
SYSTOLIC BLOOD PRESSURE: 122 MMHG | WEIGHT: 181.6 LBS | HEART RATE: 85 BPM | DIASTOLIC BLOOD PRESSURE: 76 MMHG | TEMPERATURE: 97.3 F | HEIGHT: 67 IN | BODY MASS INDEX: 28.5 KG/M2 | OXYGEN SATURATION: 96 % | RESPIRATION RATE: 16 BRPM

## 2018-01-18 DIAGNOSIS — J98.19 RIGHT MIDDLE LOBE SYNDROME: ICD-10-CM

## 2018-01-18 PROCEDURE — 99214 OFFICE O/P EST MOD 30 MIN: CPT | Performed by: INTERNAL MEDICINE

## 2018-01-18 NOTE — PROGRESS NOTES
"Chief Complaint   Patient presents with   • Follow-Up     6 month follow up       HPI: This patient is an 87 showing y.o. Female who returns for pulmonary follow-up. 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 She is a lifelong nonsmoker. She uses Advair discus 100/50. Denies cough, wheezing, worsening dyspnea, any fevers, sweats or weight loss. She is up-to-date on influenza vaccine. She is in physical therapy after a ground-level fall.    Past Medical History:   Diagnosis Date   • Anesthesia     Pt and spouse state, anesthesia causes her seizures, she has had several seizures at least 4 in PACU, following several different surgeries.  She has had colonoscopies and cataract surgery and did fine.   • Arthritis     bilateral knees   • Asthma    • Breath shortness     02 @ 2L con't   • Cancer (CMS-HCC)     skin cancer   • Cataract     bilateral IOL   • CHEST PAIN 5/3/2011   • CHF (congestive heart failure) (CMS-HCC) 8/3/2011   • Esophageal reflux 3/19/2008   • Fatigue 5/3/2011   • Heart burn     \"chronic pain in my stomach\"   • Hiatus hernia syndrome    • HLD (hyperlipidemia) 3/29/2011   • Hypertension    • Incontinence    • Left bundle branch block 3/29/2011   • Osteoarthritis 3/19/2008   • Pneumonia     2010   • Seizure (CMS-HCC) 9/2/2008    Pt states some anesthesia causes seizures, last seizure 4 years ago   • Subjective visual disturbance, unspecified 5/3/2011   • Unspecified vertiginous syndromes and labyrinthine disorders 3/19/2008   • Urinary bladder disorder        Social History     Social History   • Marital status:      Spouse name: N/A   • Number of children: N/A   • Years of education: N/A     Occupational History   • Not on file.     Social History Main Topics   • Smoking status: Never Smoker   • Smokeless tobacco: Never Used   • Alcohol use No   • Drug use: No   • Sexual activity: Not on file     Other Topics Concern   • Not on file     Social History Narrative   • No " narrative on file       Family History   Problem Relation Age of Onset   • Stroke Mother    • Heart Disease Father        Current Outpatient Prescriptions on File Prior to Visit   Medication Sig Dispense Refill   • losartan (COZAAR) 25 MG Tab Take 1 tablet by mouth  every day 90 Tab 3   • Probiotic Product (PROBIOTIC DAILY PO) Take  by mouth.     • Omega-3 Fatty Acids (OMEGA 3 PO) Take  by mouth.     • oxcarbazepine (TRILEPTAL) 300 MG Tab Take 1 tablet by mouth two  times daily 60 Tab 5   • divalproex ER (DEPAKOTE ER) 500 MG TABLET SR 24 HR Take 2 Tabs by mouth every bedtime. 60 Tab 0   • atorvastatin (LIPITOR) 40 MG Tab Take 1 tablet by mouth  every evening 90 Tab 2   • carvedilol (COREG) 6.25 MG Tab Take 1 tablet by mouth two  times daily with meals 180 Tab 2   • divalproex (DEPAKOTE) 500 MG Tablet Delayed Response Take 1 tablet by mouth two  times daily 180 Tab 1   • Ascorbic Acid (VITAMIN C) 100 MG Tab Take  by mouth.     • VITAMIN D, CHOLECALCIFEROL, PO Take  by mouth.     • gabapentin (NEURONTIN) 100 MG Cap Take 100 mg by mouth 2 Times a Day.     • methenamine (MANDELAMINE) 1 GM tablet Take 1 g by mouth 4 times a day.     • pentosan (ELMIRON) 100 MG Cap Take 100 mg by mouth 3 times a day before meals.     • Mirabegron ER (MYRBETRIQ) 50 MG TABLET SR 24 HR Take  by mouth.     • fluticasone-salmeterol (ADVAIR DISKUS) 100-50 MCG/DOSE AEROSOL POWDER, BREATH ACTIVATED Inhale 1 Puff by mouth 2 times a day. Rinse mouth after each use. 3 Inhaler 3   • aspirin 81 MG tablet Take 81 mg by mouth every day.     • omeprazole (PRILOSEC) 20 MG CPDR Take 20 mg by mouth every day.       • nitroglycerin (NITRODUR) 0.4 MG/HR PATCH 24 HR Apply 1 Patch to skin as directed every day.       No current facility-administered medications on file prior to visit.        Allergies: Ciprofloxacin and Prednisone    ROS:   Constitutional: Denies fevers, chills, night sweats, fatigue or weight loss  Eyes: Denies vision loss, pain, drainage,  "double vision  Ears, Nose, Throat: Denies earache, difficulty hearing, tinnitus, nasal congestion, hoarseness  Cardiovascular: Denies chest pain, tightness, palpitations, orthopnea or edema  Respiratory: Denies shortness of breath, cough, wheezing, hemoptysis  Sleep: Denies daytime sleepiness, snoring, apneas, insomnia, morning headaches  GI: Denies heartburn, dysphagia, nausea, abdominal pain, diarrhea or constipation  : Denies frequent urination, hematuria, discharge or painful urination  Musculoskeletal: Denies back pain, +painful joints, denies sore muscles  Neurological: Denies weakness or headaches  Skin: No rashes    Blood pressure 122/76, pulse 85, temperature 36.3 °C (97.3 °F), resp. rate 16, height 1.702 m (5' 7\"), weight 82.4 kg (181 lb 9.6 oz), SpO2 96 %.    Physical Exam:  Appearance: Well-nourished, well-developed, in no acute distress  HEENT: Normocephalic, atraumatic, white sclera, PERRLA, oropharynx clear  Neck: No adenopathy or masses  Respiratory: no intercostal retractions or accessory muscle use  Lungs auscultation: Clear to auscultation bilaterally  Cardiovascular: Regular rate rhythm. No murmurs, rubs or gallops.  No LE edema  Abdomen: soft, nondistended  Gait: Uses walker  Digits: No clubbing, cyanosis  Motor: No focal deficits  Orientation: Oriented to time, person and place    Diagnosis:  1. Right middle lobe syndrome      s/p lobectomy       Plan:  Patient is clinically stable. Continue Advair Diskus 100/50 twice a day with albuterol HFA prn.  Return in about 6 months (around 7/18/2018).      "

## 2018-02-21 ENCOUNTER — TELEPHONE (OUTPATIENT)
Dept: CARDIOLOGY | Facility: MEDICAL CENTER | Age: 83
End: 2018-02-21

## 2018-02-21 ENCOUNTER — OFFICE VISIT (OUTPATIENT)
Dept: NEUROLOGY | Facility: MEDICAL CENTER | Age: 83
End: 2018-02-21
Payer: MEDICARE

## 2018-02-21 VITALS
TEMPERATURE: 97.8 F | DIASTOLIC BLOOD PRESSURE: 60 MMHG | RESPIRATION RATE: 16 BRPM | WEIGHT: 185 LBS | HEART RATE: 81 BPM | SYSTOLIC BLOOD PRESSURE: 104 MMHG | BODY MASS INDEX: 29.03 KG/M2 | HEIGHT: 67 IN | OXYGEN SATURATION: 91 %

## 2018-02-21 DIAGNOSIS — G62.89 OTHER POLYNEUROPATHY: ICD-10-CM
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
## 2018-02-21 DIAGNOSIS — G40.909 SEIZURE DISORDER (HCC): ICD-10-CM

## 2018-02-21 PROCEDURE — 99214 OFFICE O/P EST MOD 30 MIN: CPT | Performed by: NURSE PRACTITIONER

## 2018-02-21 RX ORDER — OXCARBAZEPINE 300 MG/1
TABLET, FILM COATED ORAL
Qty: 180 TAB | Refills: 3 | Status: SHIPPED | OUTPATIENT
Start: 2018-02-21 | End: 2019-06-28 | Stop reason: CLARIF

## 2018-02-21 RX ORDER — DIVALPROEX SODIUM 500 MG/1
1000 TABLET, EXTENDED RELEASE ORAL
Qty: 180 TAB | Refills: 3 | Status: SHIPPED | OUTPATIENT
Start: 2018-02-21 | End: 2019-06-28 | Stop reason: CLARIF

## 2018-02-21 NOTE — TELEPHONE ENCOUNTER
Dr. Wynn,  I checked with pharmacy and they had no record of dispensing nitrostat.   Pt has atypical chest pain and had an episode today.  3 doses of NTG were given with relief, per .  He agreed to take pt to ER if she continues to have chest pain.    OK to order nitrostat?

## 2018-02-21 NOTE — PROGRESS NOTES
Subjective:      Judit Ramsey is a 87 y.o. female who presents with Follow-Up       Here with  today.  He is quite frustrated and angered that they were late to their last appointment in November 2017 and were rescheduled to today, 3 months later.  He is quite agitated during the appointment today.  Ultimately,  Lis discussed office policy and plan for future care.    Furthermore, Judit is describing a left mid-thoracic chest pain.  She has had this intermittently and had extensive work-up for such.  She left her oxygen in the car and Pulse Ox saturations are low 80's.  She is provided oxygen nasal canula at 2L and Sats normalize.  Judit is ultimately taken to her car via wheelchair.    HPI    Generally doing well and without much concern today.  She has had no concern for seizures.  Judit seems alert and is talkative today.    No recent illness.     Ref. Range 8/22/2017 10:25   Valproic Acid Latest Ref Range: 50 - 100 ug/mL 30 (L)   Oxycarbazepine Latest Ref Range: 10 - 35 ug/mL 5 (L)       Current Outpatient Prescriptions   Medication Sig Dispense Refill   • losartan (COZAAR) 25 MG Tab Take 1 tablet by mouth  every day 90 Tab 3   • Probiotic Product (PROBIOTIC DAILY PO) Take  by mouth.     • Omega-3 Fatty Acids (OMEGA 3 PO) Take  by mouth.     • oxcarbazepine (TRILEPTAL) 300 MG Tab Take 1 tablet by mouth two  times daily 60 Tab 5   • divalproex ER (DEPAKOTE ER) 500 MG TABLET SR 24 HR Take 2 Tabs by mouth every bedtime. 60 Tab 0   • atorvastatin (LIPITOR) 40 MG Tab Take 1 tablet by mouth  every evening 90 Tab 2   • carvedilol (COREG) 6.25 MG Tab Take 1 tablet by mouth two  times daily with meals 180 Tab 2   • divalproex (DEPAKOTE) 500 MG Tablet Delayed Response Take 1 tablet by mouth two  times daily 180 Tab 1   • Ascorbic Acid (VITAMIN C) 100 MG Tab Take  by mouth.     • VITAMIN D, CHOLECALCIFEROL, PO Take  by mouth.     • gabapentin (NEURONTIN) 100 MG Cap Take 100 mg by mouth 2  "Times a Day.     • pentosan (ELMIRON) 100 MG Cap Take 100 mg by mouth 3 times a day before meals.     • Mirabegron ER (MYRBETRIQ) 50 MG TABLET SR 24 HR Take  by mouth.     • fluticasone-salmeterol (ADVAIR DISKUS) 100-50 MCG/DOSE AEROSOL POWDER, BREATH ACTIVATED Inhale 1 Puff by mouth 2 times a day. Rinse mouth after each use. 3 Inhaler 3   • aspirin 81 MG tablet Take 81 mg by mouth every day.     • omeprazole (PRILOSEC) 20 MG CPDR Take 20 mg by mouth every day.         No current facility-administered medications for this visit.        Review of Systems   Constitutional: Positive for malaise/fatigue (chronic). Negative for weight loss.   HENT: Positive for hearing loss (chronic). Negative for nosebleeds and sore throat.         No recent head injury.   Eyes: Negative for double vision.        No new loss of vision.   Respiratory: Positive for shortness of breath (when without her oxygen). Negative for cough.         No recent lung infections.   Cardiovascular: Positive for chest pain (left mid chest pain, not uncommon to her.).   Gastrointestinal: Negative for abdominal pain, diarrhea, nausea and vomiting.   Genitourinary: Negative.    Musculoskeletal: Positive for joint pain and myalgias. Negative for falls.   Skin: Negative.    Neurological: Negative for seizures and headaches.   Endo/Heme/Allergies:        No history of endocrine dysfunction.  No new problems.   Psychiatric/Behavioral: Positive for depression (possible). The patient is not nervous/anxious.         No recent mood changes.          Objective:     /60   Pulse 81   Temp 36.6 °C (97.8 °F)   Resp 16   Ht 1.702 m (5' 7\")   Wt 83.9 kg (185 lb)   SpO2 91%   BMI 28.98 kg/m²      Physical Exam   Constitutional: She is oriented to person, place, and time. She appears well-developed and well-nourished.   HENT:   Head: Normocephalic and atraumatic.   Eyes: Pupils are equal, round, and reactive to light.   Neck: Normal range of motion. "   Cardiovascular: Normal rate and regular rhythm.    Pulmonary/Chest: Effort normal.   Neurological: She is alert and oriented to person, place, and time. She exhibits normal muscle tone. Gait (ambulating without assistance, assisted to the car by wheelchair.) abnormal.   No observable changes in neurologic status.  See initial new patient examination for details.    Skin: Skin is warm. There is pallor.   Psychiatric: She has a normal mood and affect. She is slowed.   Quiet but appropriately answers questions.            Assessment/Plan:     Seizure disorder:  Last known seizure was 4/2010.     Continue VPA 500mg BID, VPA ER 1000mg qhs and OXC 300mg BID.     Continue supplements as recommended.     Peripheral neuropathy:  Ongoing and improved with the start of GBP 100mg BID.     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  Has had falls about one time every 2-3 months.     Obtain labs within the next 2 weeks.  We discussed the possibility of tapering off of the OXC due to the hypernatremia and adding a new AED.  She developed status epilepticus when only on Depakote.  I ask for them to consider monitoring during that process.     Has had chest pain today which is consistent and similar to that of pain in the past.  She is without oxygen today during the appt.  As noted above, her O2 sats were in the 90's when she was escorted out to the car in a wheelchair.    Return for follow-up in 6 months.        I spent 40+ minutes with this patient, over fifty percent was spent counseling patient on their condition, best management practices, reviewing test results and risks and benefits of treatment.

## 2018-02-21 NOTE — TELEPHONE ENCOUNTER
----- Message from Rosy Cooper, Med Ass't sent at 2/21/2018 12:46 PM PST -----  Regarding: FW: nitrostat renewal  Contact: [phone]  Nitrostat is not listed on patient's med list, okay to fill?    ----- Message -----  From: Carol Barker  Sent: 2/21/2018  12:29 PM  To: Rosy Cooper, Med Ass't  Subject: nitrostat renewal                                RS/rosy    Pt's  calling for renewal of Nitrostat and sent to [facility] on [address].  Please call if questions [phone]

## 2018-02-21 NOTE — TELEPHONE ENCOUNTER
----- Message from Rosy Cooper, Med Ass't sent at 2/21/2018 12:46 PM PST -----  Regarding: FW: nitrostat renewal  Contact: [phone]  Nitrostat is not listed on patient's med list, okay to fill?    ----- Message -----  From: Carol Barker  Sent: 2/21/2018  12:29 PM  To: Rosy Cooper, Med Ass't  Subject: nitrostat renewal                                RS/rosy    Pt's  calling for renewal of Nitrostat and sent to [facility] on [address].  Please call if questions [phone]

## 2018-02-23 NOTE — TELEPHONE ENCOUNTER
Called pt. To advise. Scheduled pt. To see Georgina on 3-14-18 to discuss.          Chest pain thought to be non cardiac in past.   Should go to ER if concerned.   Does not need home nitre (Routing comment)       Susie Lozada R.N. routed conversation to Susie Lozada R.N.; Eleazar Wynn M.D. 2 days ago      Susie Lozada R.N. 2 days ago        ----

## 2018-02-28 ASSESSMENT — ENCOUNTER SYMPTOMS
WEIGHT LOSS: 0
FALLS: 0
NERVOUS/ANXIOUS: 0
SEIZURES: 0
DIARRHEA: 0
VOMITING: 0
HEADACHES: 0
DEPRESSION: 1
DOUBLE VISION: 0
SORE THROAT: 0
COUGH: 0
SHORTNESS OF BREATH: 1
ABDOMINAL PAIN: 0
NAUSEA: 0
MYALGIAS: 1
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
## 2018-03-01 PROBLEM — G62.9 PERIPHERAL NEUROPATHY: Status: ACTIVE | Noted: 2018-03-01

## 2018-03-01 PROBLEM — G40.909 SEIZURE DISORDER (HCC): Status: ACTIVE | Noted: 2018-03-01

## 2018-03-14 ENCOUNTER — OFFICE VISIT (OUTPATIENT)
Dept: CARDIOLOGY | Facility: MEDICAL CENTER | Age: 83
End: 2018-03-14
Payer: MEDICARE

## 2018-03-14 VITALS
BODY MASS INDEX: 29.03 KG/M2 | HEIGHT: 67 IN | SYSTOLIC BLOOD PRESSURE: 88 MMHG | HEART RATE: 84 BPM | OXYGEN SATURATION: 94 % | DIASTOLIC BLOOD PRESSURE: 52 MMHG | WEIGHT: 185 LBS

## 2018-03-14 DIAGNOSIS — R07.89 CHEST WALL PAIN, CHRONIC: ICD-10-CM

## 2018-03-14 DIAGNOSIS — I10 BENIGN ESSENTIAL HTN: ICD-10-CM

## 2018-03-14 DIAGNOSIS — R07.89 OTHER CHEST PAIN: Primary | ICD-10-CM

## 2018-03-14 DIAGNOSIS — I44.7 LEFT BUNDLE BRANCH BLOCK: Chronic | ICD-10-CM

## 2018-03-14 DIAGNOSIS — E78.2 MIXED HYPERLIPIDEMIA: ICD-10-CM

## 2018-03-14 DIAGNOSIS — G89.29 CHEST WALL PAIN, CHRONIC: ICD-10-CM

## 2018-03-14 LAB — EKG IMPRESSION: NORMAL

## 2018-03-14 PROCEDURE — 93000 ELECTROCARDIOGRAM COMPLETE: CPT | Performed by: NURSE PRACTITIONER

## 2018-03-14 PROCEDURE — 99214 OFFICE O/P EST MOD 30 MIN: CPT | Performed by: NURSE PRACTITIONER

## 2018-03-14 RX ORDER — CARVEDILOL 6.25 MG/1
6.25 TABLET ORAL 2 TIMES DAILY
Qty: 180 TAB | Refills: 3 | Status: SHIPPED | OUTPATIENT
Start: 2018-03-14 | End: 2018-12-28

## 2018-03-14 RX ORDER — ATORVASTATIN CALCIUM 40 MG/1
40 TABLET, FILM COATED ORAL EVERY EVENING
Qty: 90 TAB | Refills: 3 | Status: SHIPPED | OUTPATIENT
Start: 2018-03-14 | End: 2019-06-28 | Stop reason: CLARIF

## 2018-03-14 ASSESSMENT — ENCOUNTER SYMPTOMS
ABDOMINAL PAIN: 0
DIZZINESS: 0
MYALGIAS: 0
SHORTNESS OF BREATH: 0
PALPITATIONS: 0
ORTHOPNEA: 0
CLAUDICATION: 0
PND: 0
COUGH: 0
WEAKNESS: 0

## 2018-03-14 NOTE — PROGRESS NOTES
"Subjective:   Judit Ramsey is a 87 y.o. female who presents today with her , Ed, to follow-up on chest pain.    Within the last couple weeks she has been having sharp, stabbing pains in the left side of her chest that last only seconds. She was having some frequent episodes of this therefore her  gave her nitroglycerin. He said within an hour her pain was relieved.    She has a history of the same kind of chest pain going back as far as August 2016 at which time she was seen by Dr. Wynn. At some point she had had a nuclear stress test which had some abnormality alluded which she felt was due to her left bundle branch block.    The patient complains of a sharp stabbing pain in the left side of her chest that occurs at rest. There is no shortness of breath, nausea or diaphoresis with this. Palpation to the left side of her chest reproduces her pain.    Both the patient and her  are very anxious and worried about this pain feeling that it is due to her heart. She has no exertional symptoms of chest pain. She does have shortness of breath with walking on a flat surface but she is not very active.      Past Medical History:   Diagnosis Date   • Anesthesia     Pt and spouse state, anesthesia causes her seizures, she has had several seizures at least 4 in PACU, following several different surgeries.  She has had colonoscopies and cataract surgery and did fine.   • Arthritis     bilateral knees   • Asthma    • Breath shortness     02 @ 2L con't   • Cancer (CMS-HCC)     skin cancer   • Cataract     bilateral IOL   • CHEST PAIN 5/3/2011   • CHF (congestive heart failure) (CMS-[facility]) 8/3/2011   • Esophageal reflux 3/19/2008   • Fatigue 5/3/2011   • Heart burn     \"chronic pain in my stomach\"   • Hiatus hernia syndrome    • HLD (hyperlipidemia) 3/29/2011   • Hypertension    • Incontinence    • Left bundle branch block 3/29/2011   • Osteoarthritis 3/19/2008   • Pneumonia     2010   • Seizure (CMS-HCC) " 9/2/2008    Pt states some anesthesia causes seizures, last seizure 4 years ago   • Subjective visual disturbance, unspecified 5/3/2011   • Unspecified vertiginous syndromes and labyrinthine disorders 3/19/2008   • Urinary bladder disorder      Past Surgical History:   Procedure Laterality Date   • ECTROPIAN REPAIR Left 8/3/2016    Procedure: ECTROPIAN REPAIR - LOWER LID WITH LATERAL TARSAL STRIP;  Surgeon: Daniel Pimentel M.D.;  Location: SURGERY SURGICAL [facility];  Service:    • KNEE REPLACEMENT, TOTAL  2010    BILATERAL   • ABDOMINAL HYSTERECTOMY TOTAL     • CHOLECYSTECTOMY     • GENERAL LUNG SURGERY      LUNG SEGMENTAL RESECTION.     Family History   Problem Relation Age of Onset   • Stroke Mother    • Heart Disease Father      History   Smoking Status   • Never Smoker   Smokeless Tobacco   • Never Used     Allergies   Allergen Reactions   • Ciprofloxacin      SEIZURES.   • Prednisone      Outpatient Encounter Prescriptions as of 3/14/2018   Medication Sig Dispense Refill   • atorvastatin (LIPITOR) 40 MG Tab Take 1 Tab by mouth every evening. 90 Tab 3   • carvedilol (COREG) 6.25 MG Tab Take 1 Tab by mouth 2 times a day. 180 Tab 3   • OXcarbazepine (TRILEPTAL) 300 MG Tab Take 1 tablet by mouth two  times daily 180 Tab 3   • divalproex ER (DEPAKOTE ER) 500 MG TABLET SR 24 HR Take 2 Tabs by mouth every bedtime. 180 Tab 3   • Probiotic Product (PROBIOTIC DAILY PO) Take  by mouth.     • Omega-3 Fatty Acids (OMEGA 3 PO) Take  by mouth.     • Ascorbic Acid (VITAMIN C) 100 MG Tab Take  by mouth.     • VITAMIN D, CHOLECALCIFEROL, PO Take  by mouth.     • gabapentin (NEURONTIN) 100 MG Cap Take 100 mg by mouth 2 Times a Day.     • pentosan (ELMIRON) 100 MG Cap Take 100 mg by mouth 3 times a day before meals.     • Mirabegron ER (MYRBETRIQ) 50 MG TABLET SR 24 HR Take  by mouth.     • fluticasone-salmeterol (ADVAIR DISKUS) 100-50 MCG/DOSE AEROSOL POWDER, BREATH ACTIVATED Inhale 1 Puff by mouth 2 times a day. Rinse mouth after  "each use. 3 Inhaler 3   • aspirin 81 MG tablet Take 81 mg by mouth every day.     • omeprazole (PRILOSEC) 20 MG CPDR Take 20 mg by mouth every day.       • [DISCONTINUED] losartan (COZAAR) 25 MG Tab Take 1 tablet by mouth  every day 90 Tab 3   • [DISCONTINUED] atorvastatin (LIPITOR) 40 MG Tab Take 1 tablet by mouth  every evening 90 Tab 2   • [DISCONTINUED] carvedilol (COREG) 6.25 MG Tab Take 1 tablet by mouth two  times daily with meals 180 Tab 2   • [DISCONTINUED] divalproex (DEPAKOTE) 500 MG Tablet Delayed Response Take 1 tablet by mouth two  times daily (Patient not taking: Reported on 3/14/2018) 180 Tab 1     No facility-administered encounter medications on file as of 3/14/2018.      Review of Systems   Constitutional: Negative for malaise/fatigue.   Respiratory: Negative for cough and shortness of breath.    Cardiovascular: Positive for chest pain (sharp left sided chest pain reproduced with palpation). Negative for palpitations, orthopnea, claudication, leg swelling and PND.   Gastrointestinal: Negative for abdominal pain.   Musculoskeletal: Negative for myalgias.   Neurological: Negative for dizziness and weakness.        Objective:   BP (!) 88/52   Pulse 84   Ht 1.702 m (5' 7\")   Wt 83.9 kg (185 lb)   SpO2 94%   BMI 28.98 kg/m²     Physical Exam   Constitutional: She is oriented to person, place, and time. She appears well-developed and well-nourished.   HENT:   Head: Normocephalic.   Eyes: Conjunctivae are normal.   Neck: No JVD present. No thyromegaly present.   Cardiovascular: Normal rate, regular rhythm, S1 normal, S2 normal and normal heart sounds.  Exam reveals no gallop, no S3, no S4 and no friction rub.    No murmur heard.  Pulmonary/Chest: Effort normal and breath sounds normal. No respiratory distress. She has no wheezes. She has no rales. She exhibits tenderness (tender to palpation on the left side of her chest which reproduces her pain.).   Abdominal: Soft. Bowel sounds are normal. She " exhibits no distension. There is no tenderness.   Musculoskeletal: She exhibits no edema.   Neurological: She is alert and oriented to person, place, and time.   Skin: Skin is warm and dry.   Psychiatric: She has a normal mood and affect.     Today: EKG is left bundle branch block. Unchanged from August 2016.  Assessment:     1. Other chest pain  EKG   2. Chest wall pain, chronic     3. Left bundle branch block     4. Mixed hyperlipidemia  atorvastatin (LIPITOR) 40 MG Tab    carvedilol (COREG) 6.25 MG Tab    COMP METABOLIC PANEL    LIPID PROFILE   5. Benign essential HTN  atorvastatin (LIPITOR) 40 MG Tab    carvedilol (COREG) 6.25 MG Tab       Medical Decision Making:  Today's Assessment / Status / Plan:     Chest pain: The patient clearly has chest wall pain that is easily reproduced with palpation to the left side of her chest. Both the patient and her  are very worried about this pain and finding it difficult to understand that this is not from her heart. I spent several minutes discussing chest wall pain and why she should not receive nitroglycerin for this. They were reluctant to discontinue the use of the nitroglycerin since they felt it relieved her pain within an hour.    Left bundle branch block: Stable and unchanged. Dr. Wynn felt that her nuclear stress test was abnormal due to the presence of left bundle branch block. Patient has no exertional chest pain therefore I am not concerned about coronary artery disease.    Hyperlipidemia: She is on a statin. The most recent lipids have in the chart are from 2015. We will do lipid and metabolic panel at her convenience.    Hypertension: Her blood pressure is low in the office today. I will have her discontinue losartan. She will continue on carvedilol. I've asked her  not to give her any more nitroglycerin as she could become hypotensive and potentially fall. He is agreeable after discussion.    She will follow-up as scheduled with Dr. Wynn in  December 2018. She will follow-up sooner if she has a cardiac problem. Possibly her primary care can help treat her left-sided chest wall pain.    Collaborating Provider: Dr. Sorto.

## 2018-03-14 NOTE — LETTER
[facility]   [address] [place] [address]  ERIK Saucedo [address]  Phone: [phone]  Fax: [phone]              Judit Ramsey  6/7/1930    Encounter Date: 3/14/2018    DENZEL Juan          PROGRESS NOTE:  Subjective:   Judit Ramsey is a 87 y.o. female who presents today with her , Ed, to follow-up on chest pain.    Within the last couple weeks she has been having sharp, stabbing pains in the left side of her chest that last only seconds. She was having some frequent episodes of this therefore her  gave her nitroglycerin. He said within an hour her pain was relieved.    She has a history of the same kind of chest pain going back as far as August 2016 at which time she was seen by Dr. Wynn. At some point she had had a nuclear stress test which had some abnormality alluded which she felt was due to her left bundle branch block.    The patient complains of a sharp stabbing pain in the left side of her chest that occurs at rest. There is no shortness of breath, nausea or diaphoresis with this. Palpation to the left side of her chest reproduces her pain.    Both the patient and her  are very anxious and worried about this pain feeling that it is due to her heart. She has no exertional symptoms of chest pain. She does have shortness of breath with walking on a flat surface but she is not very active.      Past Medical History:   Diagnosis Date   • Anesthesia     Pt and spouse state, anesthesia causes her seizures, she has had several seizures at least 4 in PACU, following several different surgeries.  She has had colonoscopies and cataract surgery and did fine.   • Arthritis     bilateral knees   • Asthma    • Breath shortness     02 @ 2L con't   • Cancer (CMS-HCC)     skin cancer   • Cataract     bilateral IOL   • CHEST PAIN 5/3/2011   • CHF (congestive heart failure) (CMS-HCC) 8/3/2011   • Esophageal reflux 3/19/2008   • Fatigue  "5/3/2011   • Heart burn     \"chronic pain in my stomach\"   • Hiatus hernia syndrome    • HLD (hyperlipidemia) 3/29/2011   • Hypertension    • Incontinence    • Left bundle branch block 3/29/2011   • Osteoarthritis 3/19/2008   • Pneumonia     2010   • Seizure (CMS-HCC) 9/2/2008    Pt states some anesthesia causes seizures, last seizure 4 years ago   • Subjective visual disturbance, unspecified 5/3/2011   • Unspecified vertiginous syndromes and labyrinthine disorders 3/19/2008   • Urinary bladder disorder      Past Surgical History:   Procedure Laterality Date   • ECTROPIAN REPAIR Left 8/3/2016    Procedure: ECTROPIAN REPAIR - LOWER LID WITH LATERAL TARSAL STRIP;  Surgeon: Daniel Pimentel M.D.;  Location: SURGERY SURGICAL [facility];  Service:    • KNEE REPLACEMENT, TOTAL  2010    BILATERAL   • ABDOMINAL HYSTERECTOMY TOTAL     • CHOLECYSTECTOMY     • GENERAL LUNG SURGERY      LUNG SEGMENTAL RESECTION.     Family History   Problem Relation Age of Onset   • Stroke Mother    • Heart Disease Father      History   Smoking Status   • Never Smoker   Smokeless Tobacco   • Never Used     Allergies   Allergen Reactions   • Ciprofloxacin      SEIZURES.   • Prednisone      Outpatient Encounter Prescriptions as of 3/14/2018   Medication Sig Dispense Refill   • atorvastatin (LIPITOR) 40 MG Tab Take 1 Tab by mouth every evening. 90 Tab 3   • carvedilol (COREG) 6.25 MG Tab Take 1 Tab by mouth 2 times a day. 180 Tab 3   • OXcarbazepine (TRILEPTAL) 300 MG Tab Take 1 tablet by mouth two  times daily 180 Tab 3   • divalproex ER (DEPAKOTE ER) 500 MG TABLET SR 24 HR Take 2 Tabs by mouth every bedtime. 180 Tab 3   • Probiotic Product (PROBIOTIC DAILY PO) Take  by mouth.     • Omega-3 Fatty Acids (OMEGA 3 PO) Take  by mouth.     • Ascorbic Acid (VITAMIN C) 100 MG Tab Take  by mouth.     • VITAMIN D, CHOLECALCIFEROL, PO Take  by mouth.     • gabapentin (NEURONTIN) 100 MG Cap Take 100 mg by mouth 2 Times a Day.     • pentosan (ELMIRON) 100 MG Cap " "Take 100 mg by mouth 3 times a day before meals.     • Mirabegron ER (MYRBETRIQ) 50 MG TABLET SR 24 HR Take  by mouth.     • fluticasone-salmeterol (ADVAIR DISKUS) 100-50 MCG/DOSE AEROSOL POWDER, BREATH ACTIVATED Inhale 1 Puff by mouth 2 times a day. Rinse mouth after each use. 3 Inhaler 3   • aspirin 81 MG tablet Take 81 mg by mouth every day.     • omeprazole (PRILOSEC) 20 MG CPDR Take 20 mg by mouth every day.       • [DISCONTINUED] losartan (COZAAR) 25 MG Tab Take 1 tablet by mouth  every day 90 Tab 3   • [DISCONTINUED] atorvastatin (LIPITOR) 40 MG Tab Take 1 tablet by mouth  every evening 90 Tab 2   • [DISCONTINUED] carvedilol (COREG) 6.25 MG Tab Take 1 tablet by mouth two  times daily with meals 180 Tab 2   • [DISCONTINUED] divalproex (DEPAKOTE) 500 MG Tablet Delayed Response Take 1 tablet by mouth two  times daily (Patient not taking: Reported on 3/14/2018) 180 Tab 1     No facility-administered encounter medications on file as of 3/14/2018.      Review of Systems   Constitutional: Negative for malaise/fatigue.   Respiratory: Negative for cough and shortness of breath.    Cardiovascular: Positive for chest pain (sharp left sided chest pain reproduced with palpation). Negative for palpitations, orthopnea, claudication, leg swelling and PND.   Gastrointestinal: Negative for abdominal pain.   Musculoskeletal: Negative for myalgias.   Neurological: Negative for dizziness and weakness.        Objective:   BP (!) 88/52   Pulse 84   Ht 1.702 m (5' 7\")   Wt 83.9 kg (185 lb)   SpO2 94%   BMI 28.98 kg/m²      Physical Exam   Constitutional: She is oriented to person, place, and time. She appears well-developed and well-nourished.   HENT:   Head: Normocephalic.   Eyes: Conjunctivae are normal.   Neck: No JVD present. No thyromegaly present.   Cardiovascular: Normal rate, regular rhythm, S1 normal, S2 normal and normal heart sounds.  Exam reveals no gallop, no S3, no S4 and no friction rub.    No murmur " heard.  Pulmonary/Chest: Effort normal and breath sounds normal. No respiratory distress. She has no wheezes. She has no rales. She exhibits tenderness (tender to palpation on the left side of her chest which reproduces her pain.).   Abdominal: Soft. Bowel sounds are normal. She exhibits no distension. There is no tenderness.   Musculoskeletal: She exhibits no edema.   Neurological: She is alert and oriented to person, place, and time.   Skin: Skin is warm and dry.   Psychiatric: She has a normal mood and affect.     Today: EKG is left bundle branch block. Unchanged from August 2016.  Assessment:     1. Other chest pain  EKG   2. Chest wall pain, chronic     3. Left bundle branch block     4. Mixed hyperlipidemia  atorvastatin (LIPITOR) 40 MG Tab    carvedilol (COREG) 6.25 MG Tab    COMP METABOLIC PANEL    LIPID PROFILE   5. Benign essential HTN  atorvastatin (LIPITOR) 40 MG Tab    carvedilol (COREG) 6.25 MG Tab       Medical Decision Making:  Today's Assessment / Status / Plan:     Chest pain: The patient clearly has chest wall pain that is easily reproduced with palpation to the left side of her chest. Both the patient and her  are very worried about this pain and finding it difficult to understand that this is not from her heart. I spent several minutes discussing chest wall pain and why she should not receive nitroglycerin for this. They were reluctant to discontinue the use of the nitroglycerin since they felt it relieved her pain within an hour.    Left bundle branch block: Stable and unchanged. Dr. Wynn felt that her nuclear stress test was abnormal due to the presence of left bundle branch block. Patient has no exertional chest pain therefore I am not concerned about coronary artery disease.    Hyperlipidemia: She is on a statin. The most recent lipids have in the chart are from 2015. We will do lipid and metabolic panel at her convenience.    Hypertension: Her blood pressure is low in the office  today. I will have her discontinue losartan. She will continue on carvedilol. I've asked her  not to give her any more nitroglycerin as she could become hypotensive and potentially fall. He is agreeable after discussion.    She will follow-up as scheduled with Dr. Wynn in December 2018. She will follow-up sooner if she has a cardiac problem. Possibly her primary care can help treat her left-sided chest wall pain.    Collaborating Provider: Dr. Sorto.        No Recipients

## 2018-03-15 ENCOUNTER — TELEPHONE (OUTPATIENT)
Dept: PULMONOLOGY | Facility: HOSPICE | Age: 83
End: 2018-03-15

## 2018-03-15 RX ORDER — AZITHROMYCIN 250 MG/1
TABLET, FILM COATED ORAL
Qty: 6 TAB | Refills: 1 | Status: SHIPPED | OUTPATIENT
Start: 2018-03-15 | End: 2019-06-28 | Stop reason: CLARIF

## 2018-06-30 ENCOUNTER — HOSPITAL ENCOUNTER (INPATIENT)
Dept: HOSPITAL 8 - ED | Age: 83
LOS: 4 days | Discharge: NURSING, REHAB OR LONG-TERM CARE | DRG: 469 | End: 2018-07-04
Attending: HOSPITALIST | Admitting: HOSPITALIST
Payer: MEDICARE

## 2018-06-30 VITALS — DIASTOLIC BLOOD PRESSURE: 75 MMHG | SYSTOLIC BLOOD PRESSURE: 123 MMHG

## 2018-06-30 VITALS — SYSTOLIC BLOOD PRESSURE: 129 MMHG | DIASTOLIC BLOOD PRESSURE: 80 MMHG

## 2018-06-30 VITALS — BODY MASS INDEX: 30.17 KG/M2 | WEIGHT: 192.24 LBS | HEIGHT: 67 IN

## 2018-06-30 DIAGNOSIS — K22.4: ICD-10-CM

## 2018-06-30 DIAGNOSIS — Z90.710: ICD-10-CM
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
## 2018-06-30 DIAGNOSIS — N32.81: ICD-10-CM

## 2018-06-30 DIAGNOSIS — E43: ICD-10-CM

## 2018-06-30 DIAGNOSIS — Z86.19: ICD-10-CM
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
## 2018-06-30 DIAGNOSIS — I11.0: ICD-10-CM

## 2018-06-30 DIAGNOSIS — K21.9: ICD-10-CM

## 2018-06-30 DIAGNOSIS — I44.7: ICD-10-CM

## 2018-06-30 DIAGNOSIS — G40.909: ICD-10-CM

## 2018-06-30 DIAGNOSIS — Z82.3: ICD-10-CM

## 2018-06-30 DIAGNOSIS — I50.22: ICD-10-CM

## 2018-06-30 DIAGNOSIS — D69.6: ICD-10-CM

## 2018-06-30 DIAGNOSIS — Z96.653: ICD-10-CM
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
## 2018-06-30 DIAGNOSIS — Z79.899: ICD-10-CM

## 2018-06-30 DIAGNOSIS — J96.01: ICD-10-CM
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
## 2018-06-30 DIAGNOSIS — E87.1: ICD-10-CM

## 2018-06-30 DIAGNOSIS — Z82.49: ICD-10-CM

## 2018-06-30 DIAGNOSIS — E78.5: ICD-10-CM

## 2018-06-30 DIAGNOSIS — S72.091A: Primary | ICD-10-CM

## 2018-06-30 DIAGNOSIS — Z96.641: ICD-10-CM

## 2018-06-30 DIAGNOSIS — J98.11: ICD-10-CM
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
## 2018-06-30 DIAGNOSIS — J18.9: ICD-10-CM
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
## 2018-06-30 DIAGNOSIS — D64.9: ICD-10-CM

## 2018-06-30 LAB
ALBUMIN SERPL-MCNC: 3.1 G/DL (ref 3.4–5)
ALP SERPL-CCNC: 45 U/L (ref 45–117)
ALT SERPL-CCNC: 14 U/L (ref 12–78)
ANION GAP SERPL CALC-SCNC: 8 MMOL/L (ref 5–15)
APTT BLD: 29 SECONDS (ref 25–31)
BASOPHILS # BLD AUTO: 0.01 X10^3/UL (ref 0–0.1)
BASOPHILS NFR BLD AUTO: 0 % (ref 0–1)
BILIRUB SERPL-MCNC: 0.5 MG/DL (ref 0.2–1)
CALCIUM SERPL-MCNC: 8.3 MG/DL (ref 8.5–10.1)
CHLORIDE SERPL-SCNC: 97 MMOL/L (ref 98–107)
CREAT SERPL-MCNC: 0.64 MG/DL (ref 0.55–1.02)
EOSINOPHIL # BLD AUTO: 0.06 X10^3/UL (ref 0–0.4)
EOSINOPHIL NFR BLD AUTO: 1 % (ref 1–7)
ERYTHROCYTE [DISTWIDTH] IN BLOOD BY AUTOMATED COUNT: 13.9 % (ref 9.6–15.2)
INR PPP: 1.05 (ref 0.93–1.1)
LYMPHOCYTES # BLD AUTO: 1.89 X10^3/UL (ref 1–3.4)
LYMPHOCYTES NFR BLD AUTO: 28 % (ref 22–44)
MCH RBC QN AUTO: 30.8 PG (ref 27–34.8)
MCHC RBC AUTO-ENTMCNC: 33.4 G/DL (ref 32.4–35.8)
MCV RBC AUTO: 92.3 FL (ref 80–100)
MD: NO
MONOCYTES # BLD AUTO: 0.41 X10^3/UL (ref 0.2–0.8)
MONOCYTES NFR BLD AUTO: 6 % (ref 2–9)
NEUTROPHILS # BLD AUTO: 4.3 X10^3/UL (ref 1.8–6.8)
NEUTROPHILS NFR BLD AUTO: 65 % (ref 42–75)
PLATELET # BLD AUTO: 124 X10^3/UL (ref 130–400)
PMV BLD AUTO: 8.3 FL (ref 7.4–10.4)
PROT SERPL-MCNC: 5.8 G/DL (ref 6.4–8.2)
PROTHROMBIN TIME: 10.8 SECONDS (ref 9.6–11.5)
RBC # BLD AUTO: 4.58 X10^6/UL (ref 3.82–5.3)
T4 FREE SERPL-MCNC: 0.88 NG/DL (ref 0.76–1.46)

## 2018-06-30 PROCEDURE — 85730 THROMBOPLASTIN TIME PARTIAL: CPT

## 2018-06-30 PROCEDURE — 36415 COLL VENOUS BLD VENIPUNCTURE: CPT

## 2018-06-30 PROCEDURE — 72170 X-RAY EXAM OF PELVIS: CPT

## 2018-06-30 PROCEDURE — C1776 JOINT DEVICE (IMPLANTABLE): HCPCS

## 2018-06-30 PROCEDURE — 83735 ASSAY OF MAGNESIUM: CPT

## 2018-06-30 PROCEDURE — 82040 ASSAY OF SERUM ALBUMIN: CPT

## 2018-06-30 PROCEDURE — 85610 PROTHROMBIN TIME: CPT

## 2018-06-30 PROCEDURE — 84100 ASSAY OF PHOSPHORUS: CPT

## 2018-06-30 PROCEDURE — 84443 ASSAY THYROID STIM HORMONE: CPT

## 2018-06-30 PROCEDURE — 71045 X-RAY EXAM CHEST 1 VIEW: CPT

## 2018-06-30 PROCEDURE — 80048 BASIC METABOLIC PNL TOTAL CA: CPT

## 2018-06-30 PROCEDURE — 80053 COMPREHEN METABOLIC PANEL: CPT

## 2018-06-30 PROCEDURE — 85025 COMPLETE CBC W/AUTO DIFF WBC: CPT

## 2018-06-30 PROCEDURE — C1762 CONN TISS, HUMAN(INC FASCIA): HCPCS

## 2018-06-30 PROCEDURE — 93005 ELECTROCARDIOGRAM TRACING: CPT

## 2018-06-30 PROCEDURE — C1713 ANCHOR/SCREW BN/BN,TIS/BN: HCPCS

## 2018-06-30 PROCEDURE — 84439 ASSAY OF FREE THYROXINE: CPT

## 2018-06-30 RX ADMIN — MORPHINE SULFATE PRN MG: 10 INJECTION INTRAVENOUS at 17:26

## 2018-06-30 RX ADMIN — DIVALPROEX SODIUM SCH MG: 500 TABLET, DELAYED RELEASE ORAL at 20:52

## 2018-06-30 RX ADMIN — MORPHINE SULFATE PRN MG: 4 INJECTION INTRAVENOUS at 13:42

## 2018-06-30 RX ADMIN — MORPHINE SULFATE PRN MG: 10 INJECTION INTRAVENOUS at 20:52

## 2018-06-30 RX ADMIN — OXCARBAZEPINE SCH MG: 300 TABLET, FILM COATED ORAL at 20:52

## 2018-06-30 RX ADMIN — MORPHINE SULFATE PRN MG: 4 INJECTION INTRAVENOUS at 12:50

## 2018-06-30 RX ADMIN — ATORVASTATIN CALCIUM SCH MG: 40 TABLET, FILM COATED ORAL at 20:52

## 2018-06-30 RX ADMIN — CARVEDILOL SCH MG: 6.25 TABLET, FILM COATED ORAL at 20:51

## 2018-07-01 VITALS — DIASTOLIC BLOOD PRESSURE: 55 MMHG | SYSTOLIC BLOOD PRESSURE: 107 MMHG

## 2018-07-01 VITALS — SYSTOLIC BLOOD PRESSURE: 105 MMHG | DIASTOLIC BLOOD PRESSURE: 68 MMHG

## 2018-07-01 VITALS — DIASTOLIC BLOOD PRESSURE: 67 MMHG | SYSTOLIC BLOOD PRESSURE: 110 MMHG

## 2018-07-01 VITALS — SYSTOLIC BLOOD PRESSURE: 106 MMHG | DIASTOLIC BLOOD PRESSURE: 69 MMHG

## 2018-07-01 VITALS — SYSTOLIC BLOOD PRESSURE: 116 MMHG | DIASTOLIC BLOOD PRESSURE: 65 MMHG

## 2018-07-01 LAB
ALBUMIN SERPL-MCNC: 2.9 G/DL (ref 3.4–5)
ALP SERPL-CCNC: 42 U/L (ref 45–117)
ALT SERPL-CCNC: 14 U/L (ref 12–78)
ANION GAP SERPL CALC-SCNC: 8 MMOL/L (ref 5–15)
BASOPHILS # BLD AUTO: 0.02 X10^3/UL (ref 0–0.1)
BASOPHILS NFR BLD AUTO: 0 % (ref 0–1)
BILIRUB SERPL-MCNC: 0.6 MG/DL (ref 0.2–1)
CALCIUM SERPL-MCNC: 8.1 MG/DL (ref 8.5–10.1)
CHLORIDE SERPL-SCNC: 96 MMOL/L (ref 98–107)
CREAT SERPL-MCNC: 0.64 MG/DL (ref 0.55–1.02)
EOSINOPHIL # BLD AUTO: 0.06 X10^3/UL (ref 0–0.4)
EOSINOPHIL NFR BLD AUTO: 1 % (ref 1–7)
ERYTHROCYTE [DISTWIDTH] IN BLOOD BY AUTOMATED COUNT: 13.7 % (ref 9.6–15.2)
LYMPHOCYTES # BLD AUTO: 1.75 X10^3/UL (ref 1–3.4)
LYMPHOCYTES NFR BLD AUTO: 20 % (ref 22–44)
MCH RBC QN AUTO: 31.1 PG (ref 27–34.8)
MCHC RBC AUTO-ENTMCNC: 33.8 G/DL (ref 32.4–35.8)
MCV RBC AUTO: 92 FL (ref 80–100)
MD: NO
MONOCYTES # BLD AUTO: 0.59 X10^3/UL (ref 0.2–0.8)
MONOCYTES NFR BLD AUTO: 7 % (ref 2–9)
NEUTROPHILS # BLD AUTO: 6.31 X10^3/UL (ref 1.8–6.8)
NEUTROPHILS NFR BLD AUTO: 72 % (ref 42–75)
PLATELET # BLD AUTO: 117 X10^3/UL (ref 130–400)
PMV BLD AUTO: 8.3 FL (ref 7.4–10.4)
PROT SERPL-MCNC: 5.3 G/DL (ref 6.4–8.2)
RBC # BLD AUTO: 4.47 X10^6/UL (ref 3.82–5.3)
TSH SERPL-ACNC: 2.28 MIU/L (ref 0.36–3.74)

## 2018-07-01 RX ADMIN — MORPHINE SULFATE PRN MG: 10 INJECTION INTRAVENOUS at 10:15

## 2018-07-01 RX ADMIN — CARVEDILOL SCH MG: 6.25 TABLET, FILM COATED ORAL at 10:08

## 2018-07-01 RX ADMIN — OXCARBAZEPINE SCH MG: 300 TABLET, FILM COATED ORAL at 21:41

## 2018-07-01 RX ADMIN — MORPHINE SULFATE PRN MG: 10 INJECTION INTRAVENOUS at 03:39

## 2018-07-01 RX ADMIN — DIVALPROEX SODIUM SCH MG: 500 TABLET, DELAYED RELEASE ORAL at 21:41

## 2018-07-01 RX ADMIN — OMEPRAZOLE SCH MG: 20 CAPSULE, DELAYED RELEASE ORAL at 09:00

## 2018-07-01 RX ADMIN — CEFAZOLIN SODIUM SCH MLS/HR: 1 SOLUTION INTRAVENOUS at 21:40

## 2018-07-01 RX ADMIN — BUDESONIDE AND FORMOTEROL FUMARATE DIHYDRATE SCH MG: 160; 4.5 AEROSOL RESPIRATORY (INHALATION) at 09:00

## 2018-07-01 RX ADMIN — DOCUSATE SODIUM 50MG AND SENNOSIDES 8.6MG SCH TAB: 8.6; 5 TABLET, FILM COATED ORAL at 09:00

## 2018-07-01 RX ADMIN — ATORVASTATIN CALCIUM SCH MG: 40 TABLET, FILM COATED ORAL at 21:41

## 2018-07-01 RX ADMIN — FLUTICASONE FUROATE AND VILANTEROL TRIFENATATE SCH PUFF: 100; 25 POWDER RESPIRATORY (INHALATION) at 17:00

## 2018-07-01 RX ADMIN — GABAPENTIN SCH MG: 100 CAPSULE ORAL at 09:00

## 2018-07-01 RX ADMIN — OXCARBAZEPINE SCH MG: 300 TABLET, FILM COATED ORAL at 10:08

## 2018-07-01 RX ADMIN — CARVEDILOL SCH MG: 6.25 TABLET, FILM COATED ORAL at 21:41

## 2018-07-01 RX ADMIN — DIVALPROEX SODIUM SCH MG: 500 TABLET, DELAYED RELEASE ORAL at 10:07

## 2018-07-01 RX ADMIN — LOSARTAN POTASSIUM SCH MG: 50 TABLET, FILM COATED ORAL at 09:00

## 2018-07-02 VITALS — DIASTOLIC BLOOD PRESSURE: 54 MMHG | SYSTOLIC BLOOD PRESSURE: 91 MMHG

## 2018-07-02 VITALS — SYSTOLIC BLOOD PRESSURE: 108 MMHG | DIASTOLIC BLOOD PRESSURE: 64 MMHG

## 2018-07-02 VITALS — DIASTOLIC BLOOD PRESSURE: 55 MMHG | SYSTOLIC BLOOD PRESSURE: 94 MMHG

## 2018-07-02 VITALS — SYSTOLIC BLOOD PRESSURE: 98 MMHG | DIASTOLIC BLOOD PRESSURE: 59 MMHG

## 2018-07-02 VITALS — SYSTOLIC BLOOD PRESSURE: 101 MMHG | DIASTOLIC BLOOD PRESSURE: 65 MMHG

## 2018-07-02 VITALS — SYSTOLIC BLOOD PRESSURE: 95 MMHG | DIASTOLIC BLOOD PRESSURE: 58 MMHG

## 2018-07-02 LAB
ALBUMIN SERPL-MCNC: 2.8 G/DL (ref 3.4–5)
ANION GAP SERPL CALC-SCNC: 7 MMOL/L (ref 5–15)
BASOPHILS # BLD AUTO: 0.02 X10^3/UL (ref 0–0.1)
BASOPHILS NFR BLD AUTO: 0 % (ref 0–1)
CALCIUM SERPL-MCNC: 8.3 MG/DL (ref 8.5–10.1)
CHLORIDE SERPL-SCNC: 94 MMOL/L (ref 98–107)
CREAT SERPL-MCNC: 0.59 MG/DL (ref 0.55–1.02)
EOSINOPHIL # BLD AUTO: 0.03 X10^3/UL (ref 0–0.4)
EOSINOPHIL NFR BLD AUTO: 0 % (ref 1–7)
ERYTHROCYTE [DISTWIDTH] IN BLOOD BY AUTOMATED COUNT: 13.9 % (ref 9.6–15.2)
LYMPHOCYTES # BLD AUTO: 1.86 X10^3/UL (ref 1–3.4)
LYMPHOCYTES NFR BLD AUTO: 19 % (ref 22–44)
MCH RBC QN AUTO: 31.5 PG (ref 27–34.8)
MCHC RBC AUTO-ENTMCNC: 34.3 G/DL (ref 32.4–35.8)
MCV RBC AUTO: 91.7 FL (ref 80–100)
MD: NO
MONOCYTES # BLD AUTO: 0.76 X10^3/UL (ref 0.2–0.8)
MONOCYTES NFR BLD AUTO: 8 % (ref 2–9)
NEUTROPHILS # BLD AUTO: 7.09 X10^3/UL (ref 1.8–6.8)
NEUTROPHILS NFR BLD AUTO: 73 % (ref 42–75)
PLATELET # BLD AUTO: 102 X10^3/UL (ref 130–400)
PMV BLD AUTO: 8.4 FL (ref 7.4–10.4)
RBC # BLD AUTO: 4.21 X10^6/UL (ref 3.82–5.3)

## 2018-07-02 RX ADMIN — FLUTICASONE FUROATE AND VILANTEROL TRIFENATATE SCH PUFF: 100; 25 POWDER RESPIRATORY (INHALATION) at 08:56

## 2018-07-02 RX ADMIN — DOCUSATE SODIUM 50MG AND SENNOSIDES 8.6MG SCH TAB: 8.6; 5 TABLET, FILM COATED ORAL at 08:57

## 2018-07-02 RX ADMIN — CARVEDILOL SCH MG: 6.25 TABLET, FILM COATED ORAL at 21:00

## 2018-07-02 RX ADMIN — OXCARBAZEPINE SCH MG: 300 TABLET, FILM COATED ORAL at 08:57

## 2018-07-02 RX ADMIN — HYDROCODONE BITARTRATE AND ACETAMINOPHEN PRN TAB: 5; 325 TABLET ORAL at 14:56

## 2018-07-02 RX ADMIN — OMEPRAZOLE SCH MG: 20 CAPSULE, DELAYED RELEASE ORAL at 08:57

## 2018-07-02 RX ADMIN — MORPHINE SULFATE PRN MG: 10 INJECTION INTRAVENOUS at 05:13

## 2018-07-02 RX ADMIN — BUDESONIDE AND FORMOTEROL FUMARATE DIHYDRATE SCH MG: 160; 4.5 AEROSOL RESPIRATORY (INHALATION) at 09:00

## 2018-07-02 RX ADMIN — DIVALPROEX SODIUM SCH MG: 500 TABLET, DELAYED RELEASE ORAL at 08:57

## 2018-07-02 RX ADMIN — LOSARTAN POTASSIUM SCH MG: 50 TABLET, FILM COATED ORAL at 09:32

## 2018-07-02 RX ADMIN — OXCARBAZEPINE SCH MG: 300 TABLET, FILM COATED ORAL at 21:11

## 2018-07-02 RX ADMIN — ASPIRIN SCH MG: 81 TABLET, COATED ORAL at 21:11

## 2018-07-02 RX ADMIN — GABAPENTIN SCH MG: 100 CAPSULE ORAL at 08:57

## 2018-07-02 RX ADMIN — ATORVASTATIN CALCIUM SCH MG: 40 TABLET, FILM COATED ORAL at 21:11

## 2018-07-02 RX ADMIN — CEFAZOLIN SODIUM SCH MLS/HR: 1 SOLUTION INTRAVENOUS at 05:19

## 2018-07-02 RX ADMIN — DIVALPROEX SODIUM SCH MG: 500 TABLET, DELAYED RELEASE ORAL at 21:10

## 2018-07-02 RX ADMIN — CARVEDILOL SCH MG: 6.25 TABLET, FILM COATED ORAL at 09:30

## 2018-07-03 VITALS — SYSTOLIC BLOOD PRESSURE: 96 MMHG | DIASTOLIC BLOOD PRESSURE: 57 MMHG

## 2018-07-03 VITALS — SYSTOLIC BLOOD PRESSURE: 102 MMHG | DIASTOLIC BLOOD PRESSURE: 63 MMHG

## 2018-07-03 VITALS — DIASTOLIC BLOOD PRESSURE: 56 MMHG | SYSTOLIC BLOOD PRESSURE: 94 MMHG

## 2018-07-03 VITALS — DIASTOLIC BLOOD PRESSURE: 66 MMHG | SYSTOLIC BLOOD PRESSURE: 105 MMHG

## 2018-07-03 PROCEDURE — 0SRR0J9 REPLACEMENT OF RIGHT HIP JOINT, FEMORAL SURFACE WITH SYNTHETIC SUBSTITUTE, CEMENTED, OPEN APPROACH: ICD-10-PCS | Performed by: ORTHOPAEDIC SURGERY

## 2018-07-03 RX ADMIN — DOCUSATE SODIUM 50MG AND SENNOSIDES 8.6MG SCH TAB: 8.6; 5 TABLET, FILM COATED ORAL at 08:58

## 2018-07-03 RX ADMIN — DIVALPROEX SODIUM SCH MG: 500 TABLET, DELAYED RELEASE ORAL at 21:08

## 2018-07-03 RX ADMIN — OXCARBAZEPINE SCH MG: 300 TABLET, FILM COATED ORAL at 08:58

## 2018-07-03 RX ADMIN — OXCARBAZEPINE SCH MG: 300 TABLET, FILM COATED ORAL at 21:08

## 2018-07-03 RX ADMIN — ASPIRIN SCH MG: 81 TABLET, COATED ORAL at 18:49

## 2018-07-03 RX ADMIN — ATORVASTATIN CALCIUM SCH MG: 40 TABLET, FILM COATED ORAL at 21:08

## 2018-07-03 RX ADMIN — GABAPENTIN SCH MG: 100 CAPSULE ORAL at 09:00

## 2018-07-03 RX ADMIN — FLUTICASONE FUROATE AND VILANTEROL TRIFENATATE SCH PUFF: 100; 25 POWDER RESPIRATORY (INHALATION) at 09:05

## 2018-07-03 RX ADMIN — OMEPRAZOLE SCH MG: 20 CAPSULE, DELAYED RELEASE ORAL at 08:58

## 2018-07-03 RX ADMIN — HYDROCODONE BITARTRATE AND ACETAMINOPHEN PRN TAB: 5; 325 TABLET ORAL at 16:19

## 2018-07-03 RX ADMIN — BUDESONIDE AND FORMOTEROL FUMARATE DIHYDRATE SCH MG: 160; 4.5 AEROSOL RESPIRATORY (INHALATION) at 09:00

## 2018-07-03 RX ADMIN — DIVALPROEX SODIUM SCH MG: 500 TABLET, DELAYED RELEASE ORAL at 08:58

## 2018-07-03 RX ADMIN — CARVEDILOL SCH MG: 3.12 TABLET, FILM COATED ORAL at 08:59

## 2018-07-03 RX ADMIN — LOSARTAN POTASSIUM SCH MG: 25 TABLET, FILM COATED ORAL at 09:00

## 2018-07-03 RX ADMIN — CARVEDILOL SCH MG: 3.12 TABLET, FILM COATED ORAL at 20:34

## 2018-07-03 RX ADMIN — ASPIRIN SCH MG: 81 TABLET, COATED ORAL at 06:41

## 2018-07-04 VITALS — DIASTOLIC BLOOD PRESSURE: 64 MMHG | SYSTOLIC BLOOD PRESSURE: 110 MMHG

## 2018-07-04 VITALS — SYSTOLIC BLOOD PRESSURE: 110 MMHG | DIASTOLIC BLOOD PRESSURE: 72 MMHG

## 2018-07-04 VITALS — DIASTOLIC BLOOD PRESSURE: 60 MMHG | SYSTOLIC BLOOD PRESSURE: 101 MMHG

## 2018-07-04 LAB
ALBUMIN SERPL-MCNC: 2.1 G/DL (ref 3.4–5)
ANION GAP SERPL CALC-SCNC: 8 MMOL/L (ref 5–15)
BASOPHILS # BLD AUTO: 0.03 X10^3/UL (ref 0–0.1)
BASOPHILS NFR BLD AUTO: 0 % (ref 0–1)
CALCIUM SERPL-MCNC: 7.7 MG/DL (ref 8.5–10.1)
CHLORIDE SERPL-SCNC: 91 MMOL/L (ref 98–107)
CREAT SERPL-MCNC: 0.41 MG/DL (ref 0.55–1.02)
EOSINOPHIL # BLD AUTO: 0.14 X10^3/UL (ref 0–0.4)
EOSINOPHIL NFR BLD AUTO: 2 % (ref 1–7)
ERYTHROCYTE [DISTWIDTH] IN BLOOD BY AUTOMATED COUNT: 13.7 % (ref 9.6–15.2)
LYMPHOCYTES # BLD AUTO: 1.16 X10^3/UL (ref 1–3.4)
LYMPHOCYTES NFR BLD AUTO: 13 % (ref 22–44)
MCH RBC QN AUTO: 31.1 PG (ref 27–34.8)
MCHC RBC AUTO-ENTMCNC: 33.9 G/DL (ref 32.4–35.8)
MCV RBC AUTO: 91.8 FL (ref 80–100)
MD: (no result)
MONOCYTES # BLD AUTO: 0.65 X10^3/UL (ref 0.2–0.8)
MONOCYTES NFR BLD AUTO: 8 % (ref 2–9)
NEUTROPHILS # BLD AUTO: 6.67 X10^3/UL (ref 1.8–6.8)
NEUTROPHILS NFR BLD AUTO: 77 % (ref 42–75)
PLATELET # BLD AUTO: 96 X10^3/UL (ref 130–400)
PMV BLD AUTO: 8.8 FL (ref 7.4–10.4)
RBC # BLD AUTO: 3.69 X10^6/UL (ref 3.82–5.3)

## 2018-07-04 RX ADMIN — GABAPENTIN SCH MG: 100 CAPSULE ORAL at 09:42

## 2018-07-04 RX ADMIN — LOSARTAN POTASSIUM SCH MG: 25 TABLET, FILM COATED ORAL at 09:42

## 2018-07-04 RX ADMIN — BUDESONIDE AND FORMOTEROL FUMARATE DIHYDRATE SCH MG: 160; 4.5 AEROSOL RESPIRATORY (INHALATION) at 09:36

## 2018-07-04 RX ADMIN — DIVALPROEX SODIUM SCH MG: 500 TABLET, DELAYED RELEASE ORAL at 09:42

## 2018-07-04 RX ADMIN — HYDROCODONE BITARTRATE AND ACETAMINOPHEN PRN TAB: 5; 325 TABLET ORAL at 09:42

## 2018-07-04 RX ADMIN — DOCUSATE SODIUM 50MG AND SENNOSIDES 8.6MG SCH TAB: 8.6; 5 TABLET, FILM COATED ORAL at 09:42

## 2018-07-04 RX ADMIN — FLUTICASONE FUROATE AND VILANTEROL TRIFENATATE SCH PUFF: 100; 25 POWDER RESPIRATORY (INHALATION) at 09:43

## 2018-07-04 RX ADMIN — OMEPRAZOLE SCH MG: 20 CAPSULE, DELAYED RELEASE ORAL at 09:42

## 2018-07-04 RX ADMIN — OXCARBAZEPINE SCH MG: 300 TABLET, FILM COATED ORAL at 09:42

## 2018-07-04 RX ADMIN — CARVEDILOL SCH MG: 3.12 TABLET, FILM COATED ORAL at 09:42

## 2018-07-04 RX ADMIN — ASPIRIN SCH MG: 81 TABLET, COATED ORAL at 06:17

## 2018-08-20 DIAGNOSIS — J40 BRONCHITIS: ICD-10-CM

## 2018-08-21 NOTE — TELEPHONE ENCOUNTER
Have we ever prescribed this med? Yes.  If yes, what date? 01/19/2017    Last OV: 01/18/2018- Eula     Next OV: 01/31/2019- Eual     DX: Bronchitis    Medications:   Requested Prescriptions     Pending Prescriptions Disp Refills   • ADVAIR DISKUS 100-50 MCG/DOSE AEROSOL POWDER, BREATH ACTIVATED [Pharmacy Med Name: ADVAIR  100MCG 50MCG  INH  DISKU] 3 Inhaler 3     Sig: USE 1 INHALATION TWO TIMES  DAILY , RINSE MOUTH AFTER  EACH USE

## 2018-08-22 ENCOUNTER — APPOINTMENT (OUTPATIENT)
Dept: NEUROLOGY | Facility: MEDICAL CENTER | Age: 83
End: 2018-08-22
Payer: MEDICARE

## 2018-12-26 ENCOUNTER — TELEPHONE (OUTPATIENT)
Dept: CARDIOLOGY | Facility: MEDICAL CENTER | Age: 83
End: 2018-12-26

## 2018-12-26 NOTE — TELEPHONE ENCOUNTER
Called patient to inform them of lab work. Patient stated they had forgotten but will try to get it done.

## 2018-12-28 ENCOUNTER — OFFICE VISIT (OUTPATIENT)
Dept: CARDIOLOGY | Facility: MEDICAL CENTER | Age: 83
End: 2018-12-28
Payer: MEDICARE

## 2018-12-28 VITALS
DIASTOLIC BLOOD PRESSURE: 58 MMHG | WEIGHT: 165 LBS | SYSTOLIC BLOOD PRESSURE: 114 MMHG | HEART RATE: 78 BPM | BODY MASS INDEX: 25.9 KG/M2 | OXYGEN SATURATION: 98 % | HEIGHT: 67 IN

## 2018-12-28 DIAGNOSIS — I44.7 LEFT BUNDLE BRANCH BLOCK: Chronic | ICD-10-CM

## 2018-12-28 DIAGNOSIS — E78.2 MIXED HYPERLIPIDEMIA: ICD-10-CM

## 2018-12-28 DIAGNOSIS — R07.89 ATYPICAL CHEST PAIN: ICD-10-CM

## 2018-12-28 DIAGNOSIS — I10 ESSENTIAL HYPERTENSION: ICD-10-CM

## 2018-12-28 PROCEDURE — 99213 OFFICE O/P EST LOW 20 MIN: CPT | Performed by: INTERNAL MEDICINE

## 2018-12-28 ASSESSMENT — ENCOUNTER SYMPTOMS
BRUISES/BLEEDS EASILY: 0
FALLS: 1
CARDIOVASCULAR NEGATIVE: 1
SPUTUM PRODUCTION: 1
WEAKNESS: 1
GASTROINTESTINAL NEGATIVE: 1
WEIGHT LOSS: 1
LOSS OF CONSCIOUSNESS: 0

## 2018-12-28 NOTE — PROGRESS NOTES
"Chief Complaint   Patient presents with   • HTN (Controlled)     FV       Subjective:   Judit Ramsey is a 88 y.o. female who presents today for follow-up of left bundle branch block and history of hypertension.  The patient is quite frail and inactive.  She said no syncope.  Patient did have a fall at home.  She is now using a walker.  She has a history of right lung lobectomy.  She and her  are now living in a skilled care facility.    Past Medical History:   Diagnosis Date   • Anesthesia     Pt and spouse state, anesthesia causes her seizures, she has had several seizures at least 4 in PACU, following several different surgeries.  She has had colonoscopies and cataract surgery and did fine.   • Arthritis     bilateral knees   • Asthma    • Breath shortness     02 @ 2L con't   • Cancer (HCC)     skin cancer   • Cataract     bilateral IOL   • CHEST PAIN 5/3/2011   • CHF (congestive heart failure) ([facility]) 8/3/2011   • Esophageal reflux 3/19/2008   • Fatigue 5/3/2011   • Heart burn     \"chronic pain in my stomach\"   • Hiatus hernia syndrome    • HLD (hyperlipidemia) 3/29/2011   • Hypertension    • Incontinence    • Left bundle branch block 3/29/2011   • Osteoarthritis 3/19/2008   • Pneumonia     2010   • Seizure ([facility]) 9/2/2008    Pt states some anesthesia causes seizures, last seizure 4 years ago   • Subjective visual disturbance, unspecified 5/3/2011   • Unspecified vertiginous syndromes and labyrinthine disorders 3/19/2008   • Urinary bladder disorder      Past Surgical History:   Procedure Laterality Date   • ECTROPIAN REPAIR Left 8/3/2016    Procedure: ECTROPIAN REPAIR - LOWER LID WITH LATERAL TARSAL STRIP;  Surgeon: Daniel Pimentel M.D.;  Location: SURGERY SURGICAL [facility];  Service:    • KNEE REPLACEMENT, TOTAL  2010    BILATERAL   • ABDOMINAL HYSTERECTOMY TOTAL     • CHOLECYSTECTOMY     • GENERAL LUNG SURGERY      LUNG SEGMENTAL RESECTION.     Family History   Problem Relation Age of Onset   • Stroke " Mother    • Heart Disease Father      Social History     Social History   • Marital status:      Spouse name: N/A   • Number of children: N/A   • Years of education: N/A     Occupational History   • Not on file.     Social History Main Topics   • Smoking status: Never Smoker   • Smokeless tobacco: Never Used   • Alcohol use No   • Drug use: No   • Sexual activity: Not on file     Other Topics Concern   • Not on file     Social History Narrative   • No narrative on file     Allergies   Allergen Reactions   • Ciprofloxacin      SEIZURES.   • Prednisone      Outpatient Encounter Prescriptions as of 12/28/2018   Medication Sig Dispense Refill   • ADVAIR DISKUS 100-50 MCG/DOSE AEROSOL POWDER, BREATH ACTIVATED USE 1 INHALATION TWO TIMES  DAILY , RINSE MOUTH AFTER  EACH USE 3 Inhaler 3   • atorvastatin (LIPITOR) 40 MG Tab Take 1 Tab by mouth every evening. 90 Tab 3   • OXcarbazepine (TRILEPTAL) 300 MG Tab Take 1 tablet by mouth two  times daily 180 Tab 3   • divalproex ER (DEPAKOTE ER) 500 MG TABLET SR 24 HR Take 2 Tabs by mouth every bedtime. 180 Tab 3   • Probiotic Product (PROBIOTIC DAILY PO) Take  by mouth.     • Omega-3 Fatty Acids (OMEGA 3 PO) Take  by mouth.     • Ascorbic Acid (VITAMIN C) 100 MG Tab Take  by mouth.     • VITAMIN D, CHOLECALCIFEROL, PO Take  by mouth.     • gabapentin (NEURONTIN) 100 MG Cap Take 100 mg by mouth 2 Times a Day.     • pentosan (ELMIRON) 100 MG Cap Take 100 mg by mouth 3 times a day before meals.     • Mirabegron ER (MYRBETRIQ) 50 MG TABLET SR 24 HR Take  by mouth.     • aspirin 81 MG tablet Take 81 mg by mouth every day.     • omeprazole (PRILOSEC) 20 MG CPDR Take 20 mg by mouth every day.       • azithromycin (ZITHROMAX Z-SALVADOR) 250 MG Tab Take 2 tablets on day 1. Then take 1 tablet a day for 4 days. (Patient not taking: Reported on 12/28/2018) 6 Tab 1   • [DISCONTINUED] carvedilol (COREG) 6.25 MG Tab Take 1 Tab by mouth 2 times a day. 180 Tab 3     No facility-administered  "encounter medications on file as of 12/28/2018.      Review of Systems   Constitutional: Positive for weight loss.   HENT: Negative.    Respiratory: Positive for sputum production.    Cardiovascular: Negative.  Negative for chest pain.   Gastrointestinal: Negative.    Genitourinary: Negative for hematuria.   Musculoskeletal: Positive for falls.   Skin: Negative.    Neurological: Positive for weakness. Negative for loss of consciousness.   Endo/Heme/Allergies: Negative.  Does not bruise/bleed easily.        Objective:   /58 (BP Location: Left arm, Patient Position: Sitting, BP Cuff Size: Adult)   Pulse 78   Ht 1.702 m (5' 7\")   Wt 74.8 kg (165 lb)   SpO2 98%   BMI 25.84 kg/m²     Physical Exam   Constitutional: She is oriented to person, place, and time. No distress.   Frail, uses a walker.   HENT:   Head: Normocephalic and atraumatic.   Eyes: Pupils are equal, round, and reactive to light. No scleral icterus.   Neck: JVD present.   Cardiovascular: Normal rate and regular rhythm.    Pulmonary/Chest: Effort normal. No respiratory distress.   Abdominal: Soft. There is tenderness.   Musculoskeletal: She exhibits no edema.   Neurological: She is alert and oriented to person, place, and time.   Skin: Skin is warm and dry.       Assessment:     1. Atypical chest pain     2. Essential hypertension  COMP METABOLIC PANEL    Lipid Profile    THYROID PANEL WITH TSH   3. Left bundle branch block     4. Mixed hyperlipidemia  COMP METABOLIC PANEL    Lipid Profile    CBC WITH DIFFERENTIAL    THYROID PANEL WITH TSH       Medical Decision Making:  Today's Assessment / Status / Plan:   Left bundle branch block patient has had no syncope or presyncope.  History of hypertension: Pressure is actually low and is less than 120 systolic.  She was given written instructions to taper completely off carvedilol.    She has history of noncardiac chest pain in the past, none recently.  She had a myocardial perfusion scan approximately " 2015.  Would not recommend repeating it because of her advanced age and lack of symptoms.    Return in a year or as needed.

## 2019-01-24 LAB
ALBUMIN SERPL-MCNC: 3.7 G/DL (ref 3.5–4.7)
ALBUMIN/GLOB SERPL: 1.6 {RATIO} (ref 1.2–2.2)
ALP SERPL-CCNC: 56 IU/L (ref 39–117)
ALT SERPL-CCNC: 7 IU/L (ref 0–32)
AST SERPL-CCNC: 11 IU/L (ref 0–40)
BASOPHILS # BLD AUTO: 0 X10E3/UL (ref 0–0.2)
BASOPHILS NFR BLD AUTO: 0 %
BILIRUB SERPL-MCNC: 0.5 MG/DL (ref 0–1.2)
BUN SERPL-MCNC: 8 MG/DL (ref 8–27)
BUN/CREAT SERPL: 10 (ref 12–28)
CALCIUM SERPL-MCNC: 9.1 MG/DL (ref 8.7–10.3)
CHLORIDE SERPL-SCNC: 101 MMOL/L (ref 96–106)
CHOLEST SERPL-MCNC: 120 MG/DL (ref 100–199)
CO2 SERPL-SCNC: 20 MMOL/L (ref 20–29)
CREAT SERPL-MCNC: 0.77 MG/DL (ref 0.57–1)
EOSINOPHIL # BLD AUTO: 0.1 X10E3/UL (ref 0–0.4)
EOSINOPHIL NFR BLD AUTO: 1 %
ERYTHROCYTE [DISTWIDTH] IN BLOOD BY AUTOMATED COUNT: 14.6 % (ref 12.3–15.4)
FT4I SERPL CALC-MCNC: 1.5 (ref 1.2–4.9)
GLOBULIN SER CALC-MCNC: 2.3 G/DL (ref 1.5–4.5)
GLUCOSE SERPL-MCNC: 82 MG/DL (ref 65–99)
HCT VFR BLD AUTO: 42.3 % (ref 34–46.6)
HDLC SERPL-MCNC: 50 MG/DL
HGB BLD-MCNC: 14.4 G/DL (ref 11.1–15.9)
IMM GRANULOCYTES # BLD AUTO: 0 X10E3/UL (ref 0–0.1)
IMM GRANULOCYTES NFR BLD AUTO: 1 %
IMMATURE CELLS  115398: ABNORMAL
LABORATORY COMMENT REPORT: NORMAL
LDLC SERPL CALC-MCNC: 51 MG/DL (ref 0–99)
LYMPHOCYTES # BLD AUTO: 2.8 X10E3/UL (ref 0.7–3.1)
LYMPHOCYTES NFR BLD AUTO: 32 %
MCH RBC QN AUTO: 31.2 PG (ref 26.6–33)
MCHC RBC AUTO-ENTMCNC: 34 G/DL (ref 31.5–35.7)
MCV RBC AUTO: 92 FL (ref 79–97)
MONOCYTES # BLD AUTO: 0.6 X10E3/UL (ref 0.1–0.9)
MONOCYTES NFR BLD AUTO: 6 %
MORPHOLOGY BLD-IMP: ABNORMAL
NEUTROPHILS # BLD AUTO: 5.2 X10E3/UL (ref 1.4–7)
NEUTROPHILS NFR BLD AUTO: 60 %
NRBC BLD AUTO-RTO: ABNORMAL %
PLATELET # BLD AUTO: 116 X10E3/UL (ref 150–379)
POTASSIUM SERPL-SCNC: 3.5 MMOL/L (ref 3.5–5.2)
PROT SERPL-MCNC: 6 G/DL (ref 6–8.5)
RBC # BLD AUTO: 4.62 X10E6/UL (ref 3.77–5.28)
SODIUM SERPL-SCNC: 138 MMOL/L (ref 134–144)
T3RU NFR SERPL: 23 % (ref 24–39)
T4 SERPL-MCNC: 6.5 UG/DL (ref 4.5–12)
TRIGL SERPL-MCNC: 96 MG/DL (ref 0–149)
TSH SERPL DL<=0.005 MIU/L-ACNC: 1.94 UIU/ML (ref 0.45–4.5)
VLDLC SERPL CALC-MCNC: 19 MG/DL (ref 5–40)
WBC # BLD AUTO: 8.6 X10E3/UL (ref 3.4–10.8)

## 2019-01-31 ENCOUNTER — OFFICE VISIT (OUTPATIENT)
Dept: PULMONOLOGY | Facility: HOSPICE | Age: 84
End: 2019-01-31
Payer: MEDICARE

## 2019-01-31 VITALS
RESPIRATION RATE: 18 BRPM | DIASTOLIC BLOOD PRESSURE: 74 MMHG | BODY MASS INDEX: 26.27 KG/M2 | WEIGHT: 167.4 LBS | OXYGEN SATURATION: 97 % | HEART RATE: 100 BPM | HEIGHT: 67 IN | TEMPERATURE: 97 F | SYSTOLIC BLOOD PRESSURE: 120 MMHG

## 2019-01-31 DIAGNOSIS — Z23 NEED FOR PNEUMOCOCCAL VACCINATION: ICD-10-CM

## 2019-01-31 DIAGNOSIS — J98.19 RIGHT MIDDLE LOBE SYNDROME: ICD-10-CM

## 2019-01-31 DIAGNOSIS — R06.02 SOB (SHORTNESS OF BREATH): ICD-10-CM

## 2019-01-31 PROCEDURE — 99214 OFFICE O/P EST MOD 30 MIN: CPT | Mod: 25 | Performed by: INTERNAL MEDICINE

## 2019-01-31 PROCEDURE — 90732 PPSV23 VACC 2 YRS+ SUBQ/IM: CPT | Performed by: INTERNAL MEDICINE

## 2019-01-31 PROCEDURE — G0009 ADMIN PNEUMOCOCCAL VACCINE: HCPCS | Performed by: INTERNAL MEDICINE

## 2019-01-31 RX ORDER — AZITHROMYCIN 250 MG/1
TABLET, FILM COATED ORAL
Qty: 6 TAB | Refills: 0 | Status: SHIPPED | OUTPATIENT
Start: 2019-01-31 | End: 2019-06-28 | Stop reason: CLARIF

## 2019-01-31 RX ORDER — LOSARTAN POTASSIUM 25 MG/1
25 TABLET ORAL DAILY
Refills: 4 | COMMUNITY
Start: 2019-01-21 | End: 2019-06-27

## 2019-01-31 NOTE — PROGRESS NOTES
"Chief Complaint   Patient presents with   • Follow-Up     6 month follow up       HPI: This patient is an 88 showing y.o. Female who returns for pulmonary follow-up. 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 She is a lifelong nonsmoker. She uses Advair discus 100/50. Denies cough, wheezing, worsening dyspnea, any fevers, sweats or weight loss. She is up-to-date on influenza vaccine.   She lives in assisted living with her .  She has suffered multiple ground-level falls over the past months, and is now in a physical therapy.    Past Medical History:   Diagnosis Date   • Anesthesia     Pt and spouse state, anesthesia causes her seizures, she has had several seizures at least 4 in PACU, following several different surgeries.  She has had colonoscopies and cataract surgery and did fine.   • Arthritis     bilateral knees   • Asthma    • Breath shortness     02 @ 2L con't   • Cancer ([facility])     skin cancer   • Cataract     bilateral IOL   • CHEST PAIN 5/3/2011   • CHF (congestive heart failure) ([facility]) 8/3/2011   • Esophageal reflux 3/19/2008   • Fatigue 5/3/2011   • Heart burn     \"chronic pain in my stomach\"   • Hiatus hernia syndrome    • HLD (hyperlipidemia) 3/29/2011   • Hypertension    • Incontinence    • Left bundle branch block 3/29/2011   • Osteoarthritis 3/19/2008   • Pneumonia     2010   • Seizure ([facility]) 9/2/2008    Pt states some anesthesia causes seizures, last seizure 4 years ago   • Subjective visual disturbance, unspecified 5/3/2011   • Unspecified vertiginous syndromes and labyrinthine disorders 3/19/2008   • Urinary bladder disorder        Social History     Social History   • Marital status:      Spouse name: N/A   • Number of children: N/A   • Years of education: N/A     Occupational History   • Not on file.     Social History Main Topics   • Smoking status: Never Smoker   • Smokeless tobacco: Never Used   • Alcohol use No   • Drug use: No   • Sexual activity: Not " on file     Other Topics Concern   • Not on file     Social History Narrative   • No narrative on file       Family History   Problem Relation Age of Onset   • Stroke Mother    • Heart Disease Father        Current Outpatient Prescriptions on File Prior to Visit   Medication Sig Dispense Refill   • ADVAIR DISKUS 100-50 MCG/DOSE AEROSOL POWDER, BREATH ACTIVATED USE 1 INHALATION TWO TIMES  DAILY , RINSE MOUTH AFTER  EACH USE 3 Inhaler 3   • atorvastatin (LIPITOR) 40 MG Tab Take 1 Tab by mouth every evening. 90 Tab 3   • OXcarbazepine (TRILEPTAL) 300 MG Tab Take 1 tablet by mouth two  times daily 180 Tab 3   • divalproex ER (DEPAKOTE ER) 500 MG TABLET SR 24 HR Take 2 Tabs by mouth every bedtime. 180 Tab 3   • Probiotic Product (PROBIOTIC DAILY PO) Take  by mouth.     • Omega-3 Fatty Acids (OMEGA 3 PO) Take  by mouth.     • Ascorbic Acid (VITAMIN C) 100 MG Tab Take  by mouth.     • VITAMIN D, CHOLECALCIFEROL, PO Take  by mouth.     • gabapentin (NEURONTIN) 100 MG Cap Take 100 mg by mouth 2 Times a Day.     • pentosan (ELMIRON) 100 MG Cap Take 100 mg by mouth 3 times a day before meals.     • Mirabegron ER (MYRBETRIQ) 50 MG TABLET SR 24 HR Take  by mouth.     • aspirin 81 MG tablet Take 81 mg by mouth every day.     • omeprazole (PRILOSEC) 20 MG CPDR Take 20 mg by mouth every day.       • azithromycin (ZITHROMAX Z-SALVADOR) 250 MG Tab Take 2 tablets on day 1. Then take 1 tablet a day for 4 days. (Patient not taking: Reported on 12/28/2018) 6 Tab 1     No current facility-administered medications on file prior to visit.        Allergies: Ciprofloxacin and Prednisone    ROS:   Constitutional: Denies fevers, chills, night sweats, fatigue or weight loss  Eyes: Denies vision loss, pain, drainage, double vision  Ears, Nose, Throat: Denies earache, difficulty hearing, tinnitus, nasal congestion, hoarseness  Cardiovascular: Denies chest pain, tightness, palpitations, orthopnea or edema  Respiratory: As in HPI  Sleep: Denies daytime  "sleepiness, snoring, apneas, insomnia, morning headaches  GI: Denies heartburn, dysphagia, nausea, abdominal pain, diarrhea or constipation  : Denies frequent urination, hematuria, discharge or painful urination  Musculoskeletal: +back pain, painful joints, denies sore muscles  Neurological: Denies weakness or headaches  Skin: No rashes    Blood pressure 120/74, pulse 100, temperature 36.1 °C (97 °F), temperature source Temporal, resp. rate 18, height 1.702 m (5' 7\"), weight 75.9 kg (167 lb 6.4 oz), SpO2 97 %, not currently breastfeeding.    Physical Exam:  Appearance: Well-nourished, well-developed, in no acute distress  HEENT: Normocephalic, atraumatic, white sclera, PERRLA, oropharynx clear  Neck: No adenopathy or masses  Respiratory: no intercostal retractions or accessory muscle use  Lungs auscultation: Clear to auscultation bilaterally  Cardiovascular: Regular rate rhythm. No murmurs, rubs or gallops.  No LE edema  Abdomen: soft, nondistended  Gait: Normal, uses walker  Digits: No clubbing, cyanosis  Motor: No focal deficits  Orientation: Oriented to time, person and place    Diagnosis:  1. Need for pneumococcal vaccination  Pneumococal Polysaccharide Vaccine 23-Valent =>1YO SQ/IM   2. Right middle lobe syndrome     3. SOB (shortness of breath)  azithromycin (ZITHROMAX Z-SALVADOR) 250 MG Tab       Plan:  The patient's pulmonary status is stable.  Continue Advair 100/50 twice daily.  Pneumococcal vaccine provided.  Prescription for Z-Salvador to keep on hand for bronchitic exacerbations.  Encouraged physical therapy to improve mobility.  Return in about 6 months (around 7/31/2019).      "

## 2019-02-04 ENCOUNTER — TELEPHONE (OUTPATIENT)
Dept: CARDIOLOGY | Facility: MEDICAL CENTER | Age: 84
End: 2019-02-04

## 2019-02-04 NOTE — TELEPHONE ENCOUNTER
----- Message from Carol Barker sent at 2/4/2019  9:39 AM PST -----  Regarding: platelet count questions  Contact: [phone]  MERLYN/obinna Dillon's daughter Annette Alvarez calling to review reasons why there is such a low platelet count from 1/23 blood draw.  Please call Annette at .

## 2019-02-11 DIAGNOSIS — D69.1 ABNORMAL PLATELETS (HCC): ICD-10-CM

## 2019-02-11 NOTE — TELEPHONE ENCOUNTER
Copy of lab report faxed to [facility] [phone].  Spoke with Annette . Repeat CBC order mailed to her: Annette Alvarez, [address] [place] [address].        Message   Received: Today   Message Contents   Eleazar Wynn M.D.  Cynthia Cmaargo L.P.N.   Caller: Unspecified (1 week ago,  9:58 AM)             Platelet count is mildly low, not alarming.   Repeat in one week. She have a primary?

## 2019-02-21 ENCOUNTER — TELEPHONE (OUTPATIENT)
Dept: CARDIOLOGY | Facility: MEDICAL CENTER | Age: 84
End: 2019-02-21

## 2019-02-21 LAB
BASOPHILS # BLD AUTO: 0 X10E3/UL (ref 0–0.2)
BASOPHILS NFR BLD AUTO: 0 %
EOSINOPHIL # BLD AUTO: 0.1 X10E3/UL (ref 0–0.4)
EOSINOPHIL NFR BLD AUTO: 1 %
ERYTHROCYTE [DISTWIDTH] IN BLOOD BY AUTOMATED COUNT: 14.2 % (ref 12.3–15.4)
HCT VFR BLD AUTO: 42.4 % (ref 34–46.6)
HGB BLD-MCNC: 14.7 G/DL (ref 11.1–15.9)
IMM GRANULOCYTES # BLD AUTO: 0 X10E3/UL (ref 0–0.1)
IMM GRANULOCYTES NFR BLD AUTO: 1 %
IMMATURE CELLS  115398: ABNORMAL
LYMPHOCYTES # BLD AUTO: 3 X10E3/UL (ref 0.7–3.1)
LYMPHOCYTES NFR BLD AUTO: 39 %
MCH RBC QN AUTO: 32.2 PG (ref 26.6–33)
MCHC RBC AUTO-ENTMCNC: 34.7 G/DL (ref 31.5–35.7)
MCV RBC AUTO: 93 FL (ref 79–97)
MONOCYTES # BLD AUTO: 0.5 X10E3/UL (ref 0.1–0.9)
MONOCYTES NFR BLD AUTO: 7 %
MORPHOLOGY BLD-IMP: ABNORMAL
NEUTROPHILS # BLD AUTO: 4.1 X10E3/UL (ref 1.4–7)
NEUTROPHILS NFR BLD AUTO: 52 %
NRBC BLD AUTO-RTO: ABNORMAL %
PLATELET # BLD AUTO: 115 X10E3/UL (ref 150–379)
RBC # BLD AUTO: 4.57 X10E6/UL (ref 3.77–5.28)
WBC # BLD AUTO: 7.8 X10E3/UL (ref 3.4–10.8)

## 2019-02-22 NOTE — TELEPHONE ENCOUNTER
To Dr. Wynn. [id number]=082, down from 116 a week ago.  Discussed thrombocytopenia and possible causes.   Annette is very concerned.

## 2019-02-22 NOTE — TELEPHONE ENCOUNTER
----- Message from Brenda Swanson, Med Ass't sent at 2/21/2019  2:52 PM PST -----  Regarding: lab results  Contact: [phone]  MERLYN - Cynthia Dillon's daughter Lexie left a vm stating she wanted to discuss lab results in regards to abnormal platelets . She can be reached at [phone]

## 2019-02-25 DIAGNOSIS — D69.1 ABNORMAL PLATELETS (HCC): ICD-10-CM

## 2019-02-26 NOTE — TELEPHONE ENCOUNTER
Called Annette to advise. Future lab order mailed to her.     Message   Received: Today   Message Contents   Eleazar Wynn M.D.  Cynthia Camargo, L.P.N.             No significant difference between 115 and 116. This is not a dangerously low number. Repeat in one month.   Call if any bleeding.

## 2019-05-08 ENCOUNTER — TELEPHONE (OUTPATIENT)
Dept: CARDIOLOGY | Facility: MEDICAL CENTER | Age: 84
End: 2019-05-08

## 2019-05-08 LAB
BASOPHILS # BLD AUTO: 0 X10E3/UL (ref 0–0.2)
BASOPHILS NFR BLD AUTO: 0 %
EOSINOPHIL # BLD AUTO: 0.1 X10E3/UL (ref 0–0.4)
EOSINOPHIL NFR BLD AUTO: 1 %
ERYTHROCYTE [DISTWIDTH] IN BLOOD BY AUTOMATED COUNT: 15.1 % (ref 12.3–15.4)
HCT VFR BLD AUTO: 39.1 % (ref 34–46.6)
HGB BLD-MCNC: 14 G/DL (ref 11.1–15.9)
IMM GRANULOCYTES # BLD AUTO: 0.1 X10E3/UL (ref 0–0.1)
IMM GRANULOCYTES NFR BLD AUTO: 1 %
IMMATURE CELLS  115398: ABNORMAL
LYMPHOCYTES # BLD AUTO: 2.9 X10E3/UL (ref 0.7–3.1)
LYMPHOCYTES NFR BLD AUTO: 30 %
MCH RBC QN AUTO: 32 PG (ref 26.6–33)
MCHC RBC AUTO-ENTMCNC: 35.8 G/DL (ref 31.5–35.7)
MCV RBC AUTO: 89 FL (ref 79–97)
MONOCYTES # BLD AUTO: 0.7 X10E3/UL (ref 0.1–0.9)
MONOCYTES NFR BLD AUTO: 7 %
MORPHOLOGY BLD-IMP: ABNORMAL
NEUTROPHILS # BLD AUTO: 5.9 X10E3/UL (ref 1.4–7)
NEUTROPHILS NFR BLD AUTO: 61 %
NRBC BLD AUTO-RTO: ABNORMAL %
PLATELET # BLD AUTO: 132 X10E3/UL (ref 150–379)
RBC # BLD AUTO: 4.38 X10E6/UL (ref 3.77–5.28)
WBC # BLD AUTO: 9.6 X10E3/UL (ref 3.4–10.8)

## 2019-05-08 NOTE — LETTER
May 8, 2019        Judit Ramsey  [address]  [place] [address]          Dear Judit,    We have received the results of your recent:    Complete blood count.    Your test came back within normal limits.  Please follow up as previously discussed with your physician.      Feel free to call us with any questions.        Sincerely,    Bernarda FARRIS

## 2019-05-10 ENCOUNTER — TELEPHONE (OUTPATIENT)
Dept: CARDIOLOGY | Facility: MEDICAL CENTER | Age: 84
End: 2019-05-10

## 2019-05-10 ENCOUNTER — HOSPITAL ENCOUNTER (OUTPATIENT)
Dept: LAB | Facility: MEDICAL CENTER | Age: 84
End: 2019-05-10
Attending: INTERNAL MEDICINE
Payer: MEDICARE

## 2019-05-10 PROCEDURE — 81001 URINALYSIS AUTO W/SCOPE: CPT

## 2019-05-10 NOTE — TELEPHONE ENCOUNTER
----- Message from Chioma Driver sent at 5/10/2019 11:09 AM PDT -----  Regarding: daughter calling about lab results  MERLYN/Cynthia      Patient's daughter Annette is calling about recent lab results. She can be reached at [phone].

## 2019-05-11 LAB
AMORPH CRY #/AREA URNS HPF: PRESENT /HPF
APPEARANCE UR: ABNORMAL
BACTERIA #/AREA URNS HPF: ABNORMAL /HPF
BILIRUB UR QL STRIP.AUTO: NEGATIVE
COLOR UR: YELLOW
EPI CELLS #/AREA URNS HPF: ABNORMAL /HPF
GLUCOSE UR STRIP.AUTO-MCNC: NEGATIVE MG/DL
KETONES UR STRIP.AUTO-MCNC: NEGATIVE MG/DL
LEUKOCYTE ESTERASE UR QL STRIP.AUTO: ABNORMAL
MICRO URNS: ABNORMAL
NITRITE UR QL STRIP.AUTO: POSITIVE
PH UR STRIP.AUTO: 8 [PH]
PROT UR QL STRIP: 30 MG/DL
RBC # URNS HPF: ABNORMAL /HPF
RBC UR QL AUTO: ABNORMAL
SP GR UR STRIP.AUTO: 1.01
UROBILINOGEN UR STRIP.AUTO-MCNC: 0.2 MG/DL
WBC #/AREA URNS HPF: ABNORMAL /HPF

## 2019-06-27 ENCOUNTER — OFFICE VISIT (OUTPATIENT)
Dept: CARDIOLOGY | Facility: MEDICAL CENTER | Age: 84
End: 2019-06-27
Payer: MEDICARE

## 2019-06-27 VITALS
SYSTOLIC BLOOD PRESSURE: 100 MMHG | WEIGHT: 170 LBS | DIASTOLIC BLOOD PRESSURE: 60 MMHG | HEIGHT: 67 IN | HEART RATE: 98 BPM | OXYGEN SATURATION: 95 % | BODY MASS INDEX: 26.68 KG/M2

## 2019-06-27 DIAGNOSIS — R07.89 ATYPICAL CHEST PAIN: ICD-10-CM

## 2019-06-27 DIAGNOSIS — R53.82 CHRONIC FATIGUE: Chronic | ICD-10-CM

## 2019-06-27 DIAGNOSIS — E78.2 MIXED HYPERLIPIDEMIA: ICD-10-CM

## 2019-06-27 DIAGNOSIS — I44.7 LEFT BUNDLE BRANCH BLOCK: Chronic | ICD-10-CM

## 2019-06-27 PROCEDURE — 99214 OFFICE O/P EST MOD 30 MIN: CPT | Performed by: INTERNAL MEDICINE

## 2019-06-27 RX ORDER — DIVALPROEX SODIUM 500 MG/1
TABLET, DELAYED RELEASE ORAL
Refills: 5 | COMMUNITY
Start: 2019-05-23 | End: 2019-06-28 | Stop reason: CLARIF

## 2019-06-27 RX ORDER — FAMOTIDINE 20 MG/1
TABLET, FILM COATED ORAL
Refills: 11 | COMMUNITY
Start: 2019-06-11 | End: 2019-06-28 | Stop reason: CLARIF

## 2019-06-27 RX ORDER — MIDODRINE HYDROCHLORIDE 5 MG/1
TABLET ORAL
Refills: 0 | COMMUNITY
Start: 2019-06-20 | End: 2019-06-28 | Stop reason: CLARIF

## 2019-06-27 RX ORDER — FLUTICASONE PROPIONATE 50 MCG
SPRAY, SUSPENSION (ML) NASAL
Refills: 4 | COMMUNITY
Start: 2019-06-07 | End: 2019-06-28 | Stop reason: CLARIF

## 2019-06-27 RX ORDER — MELOXICAM 7.5 MG/1
TABLET ORAL
Refills: 11 | COMMUNITY
Start: 2019-06-11 | End: 2019-06-28 | Stop reason: CLARIF

## 2019-06-27 RX ORDER — CHOLECALCIFEROL (VITAMIN D3) 50 MCG
CAPSULE ORAL
Refills: 11 | COMMUNITY
Start: 2019-06-11 | End: 2019-06-28 | Stop reason: CLARIF

## 2019-06-27 RX ORDER — METHENAMINE HIPPURATE 1000 MG/1
TABLET ORAL
Refills: 11 | COMMUNITY
Start: 2019-06-11 | End: 2019-06-28 | Stop reason: CLARIF

## 2019-06-27 RX ORDER — DARIFENACIN HYDROBROMIDE 15 MG/1
TABLET, EXTENDED RELEASE ORAL
Refills: 5 | COMMUNITY
Start: 2019-06-14 | End: 2019-06-28 | Stop reason: CLARIF

## 2019-06-27 RX ORDER — SODIUM CHLORIDE 1 G/1
TABLET ORAL
Refills: 5 | COMMUNITY
Start: 2019-06-11 | End: 2019-06-28 | Stop reason: CLARIF

## 2019-06-27 RX ORDER — LISINOPRIL 2.5 MG/1
TABLET ORAL
Refills: 2 | COMMUNITY
Start: 2019-06-12 | End: 2019-06-27

## 2019-06-27 RX ORDER — POTASSIUM CHLORIDE 20 MEQ/1
TABLET, EXTENDED RELEASE ORAL
Refills: 11 | COMMUNITY
Start: 2019-06-11 | End: 2019-06-27

## 2019-06-27 RX ORDER — VIT A/VIT C/VIT E/ZINC/COPPER 4296-226
CAPSULE ORAL
Refills: 5 | COMMUNITY
Start: 2019-06-11 | End: 2019-06-28 | Stop reason: CLARIF

## 2019-06-27 ASSESSMENT — ENCOUNTER SYMPTOMS
WEIGHT LOSS: 0
GASTROINTESTINAL NEGATIVE: 1
WEAKNESS: 1
SPUTUM PRODUCTION: 1
FALLS: 0
LOSS OF CONSCIOUSNESS: 0
BRUISES/BLEEDS EASILY: 0
DEPRESSION: 1

## 2019-06-27 NOTE — PROGRESS NOTES
"Chief Complaint   Patient presents with   • Hypertension     Follow up    • Chest Pain       Subjective:   Judit Ramsey is a 89 y.o. female who presents today for follow-up of left bundle branch block and hypertension.  She is brought in with her  and daughter today.  The patient is very inactive and spends most of her day in the wheelchair.  She has had no further falls.  She is was is seen by pulmonary.  Medications reviewed with patient and daughter today.  Patient seems unhappy and admits to depression.    Past Medical History:   Diagnosis Date   • Anesthesia     Pt and spouse state, anesthesia causes her seizures, she has had several seizures at least 4 in PACU, following several different surgeries.  She has had colonoscopies and cataract surgery and did fine.   • Arthritis     bilateral knees   • Asthma    • Breath shortness     02 @ 2L con't   • Cancer ([facility])     skin cancer   • Cataract     bilateral IOL   • CHEST PAIN 5/3/2011   • CHF (congestive heart failure) ([facility]) 8/3/2011   • Esophageal reflux 3/19/2008   • Fatigue 5/3/2011   • Heart burn     \"chronic pain in my stomach\"   • Hiatus hernia syndrome    • HLD (hyperlipidemia) 3/29/2011   • Hypertension    • Incontinence    • Left bundle branch block 3/29/2011   • Osteoarthritis 3/19/2008   • Pneumonia     2010   • Seizure ([facility]) 9/2/2008    Pt states some anesthesia causes seizures, last seizure 4 years ago   • Subjective visual disturbance, unspecified 5/3/2011   • Unspecified vertiginous syndromes and labyrinthine disorders 3/19/2008   • Urinary bladder disorder      Past Surgical History:   Procedure Laterality Date   • ECTROPIAN REPAIR Left 8/3/2016    Procedure: ECTROPIAN REPAIR - LOWER LID WITH LATERAL TARSAL STRIP;  Surgeon: Daniel Pimentel M.D.;  Location: SURGERY SURGICAL [facility];  Service:    • KNEE REPLACEMENT, TOTAL  2010    BILATERAL   • ABDOMINAL HYSTERECTOMY TOTAL     • CHOLECYSTECTOMY     • GENERAL LUNG SURGERY      LUNG " SEGMENTAL RESECTION.     Family History   Problem Relation Age of Onset   • Stroke Mother    • Heart Disease Father      Social History     Social History   • Marital status:      Spouse name: N/A   • Number of children: N/A   • Years of education: N/A     Occupational History   • Not on file.     Social History Main Topics   • Smoking status: Never Smoker   • Smokeless tobacco: Never Used   • Alcohol use No   • Drug use: No   • Sexual activity: Not on file     Other Topics Concern   • Not on file     Social History Narrative   • No narrative on file     Allergies   Allergen Reactions   • Ciprofloxacin      SEIZURES.   • Prednisone Unspecified     dizziness     Outpatient Encounter Prescriptions as of 6/27/2019   Medication Sig Dispense Refill   • darifenacin (ENABLEX) 15 MG SR tablet   5   • divalproex (DEPAKOTE) 500 MG Tablet Delayed Response   5   • famotidine (PEPCID) 20 MG Tab   11   • fluticasone (FLONASE) 50 MCG/ACT nasal spray   4   • ACIDOPHILUS LACTOBACILLUS PO   11   • meloxicam (MOBIC) 7.5 MG Tab   11   • methenamine hip (HIPPREX) 1 GM Tab   11   • midodrine (PROAMATINE) 5 MG Tab   0   • Multiple Vitamins-Minerals (PRESERVISION AREDS) Cap   5   • sodium chloride (SALT) 1 GM Tab   5   • D3 SUPER STRENGTH 2000 units Cap   11   • ADVAIR DISKUS 100-50 MCG/DOSE AEROSOL POWDER, BREATH ACTIVATED USE 1 INHALATION TWO TIMES  DAILY , RINSE MOUTH AFTER  EACH USE 3 Inhaler 3   • atorvastatin (LIPITOR) 40 MG Tab Take 1 Tab by mouth every evening. (Patient taking differently: Take 10 mg by mouth every evening.) 90 Tab 3   • OXcarbazepine (TRILEPTAL) 300 MG Tab Take 1 tablet by mouth two  times daily 180 Tab 3   • divalproex ER (DEPAKOTE ER) 500 MG TABLET SR 24 HR Take 2 Tabs by mouth every bedtime. 180 Tab 3   • gabapentin (NEURONTIN) 100 MG Cap Take 100 mg by mouth 2 Times a Day.     • pentosan (ELMIRON) 100 MG Cap Take 100 mg by mouth 3 times a day before meals.     • aspirin 81 MG tablet Take 81 mg by mouth  "every day.     • omeprazole (PRILOSEC) 20 MG CPDR Take 20 mg by mouth every day.       • [DISCONTINUED] lisinopril (PRINIVIL) 2.5 MG Tab   2   • [DISCONTINUED] potassium chloride SA (KDUR) 20 MEQ Tab CR   11   • azithromycin (ZITHROMAX Z-SALVADOR) 250 MG Tab Take 2 tablets on day 1. Then take 1 tablet a day for 4 days. (Patient not taking: Reported on 6/27/2019) 6 Tab 0   • [DISCONTINUED] losartan (COZAAR) 25 MG Tab Take 25 mg by mouth every day.  4   • azithromycin (ZITHROMAX Z-SALVADOR) 250 MG Tab Take 2 tablets on day 1. Then take 1 tablet a day for 4 days. (Patient not taking: Reported on 12/28/2018) 6 Tab 1   • Probiotic Product (PROBIOTIC DAILY PO) Take  by mouth.     • Omega-3 Fatty Acids (OMEGA 3 PO) Take  by mouth.     • Ascorbic Acid (VITAMIN C) 100 MG Tab Take  by mouth.     • VITAMIN D, CHOLECALCIFEROL, PO Take  by mouth.     • Mirabegron ER (MYRBETRIQ) 50 MG TABLET SR 24 HR Take  by mouth.       No facility-administered encounter medications on file as of 6/27/2019.      Review of Systems   Constitutional: Negative for weight loss.   HENT: Negative.    Respiratory: Positive for sputum production.    Cardiovascular: Positive for leg swelling. Negative for chest pain.   Gastrointestinal: Negative.    Genitourinary: Negative for hematuria.   Musculoskeletal: Negative for falls.   Skin: Negative.    Neurological: Positive for weakness. Negative for loss of consciousness.   Endo/Heme/Allergies: Negative.  Does not bruise/bleed easily.   Psychiatric/Behavioral: Positive for depression.        Objective:   /60 (BP Location: Left arm, Patient Position: Sitting, BP Cuff Size: Adult)   Pulse 98   Ht 1.702 m (5' 7\")   Wt 77.1 kg (170 lb)   LMP  (LMP Unknown)   SpO2 95%   BMI 26.63 kg/m²     Physical Exam   Constitutional: She is oriented to person, place, and time. No distress.   Frail, uses a walker.   HENT:   Head: Normocephalic and atraumatic.   Eyes: Pupils are equal, round, and reactive to light. No scleral " icterus.   Neck: No JVD present.   Cardiovascular: Normal rate and regular rhythm.    Pulmonary/Chest: Effort normal. No respiratory distress.   Abdominal: Soft. She exhibits no distension. There is no tenderness.   Musculoskeletal: She exhibits edema.   2+ edema bilaterally.   Neurological: She is alert and oriented to person, place, and time.   Skin: Skin is warm and dry.       Assessment:     1. Atypical chest pain     2. Left bundle branch block     3. Mixed hyperlipidemia  Comp Metabolic Panel    CBC WITH DIFFERENTIAL    THYROID PANEL   4. Chronic fatigue  Comp Metabolic Panel    CBC WITH DIFFERENTIAL    THYROID PANEL       Medical Decision Making:  Today's Assessment / Status / Plan:   History of left bundle branch block: No history of syncope or presyncope.  History of hyperlipidemia: At nearly age 89 would recommend stopping her statins as there is no history of documented coronary disease.  Hypotension: Patient's medications were reviewed.  She is still on low-dose lisinopril was stopped this.  Would also stop her potassium supplementation and check potassium levels.  Consider stopping her midodrine in the future as she is asymptomatic.  She is happy about getting rid of some of her medications.  She did have a bit of a smile at the end of the appointment today.    Edema: I think this is multifactorial and due in large part to sitting in his chair all day.  Her daughter is not too keen on on the idea of compression stockings.  Recommend she elevate her feet when she naps in a recliner.  Would try and keep her off diuretics because of her hypotension.    Hypotension: By my reading blood pressure is 85 systolic.  Blood pressure at home have been low.  As noted above we will stop lisinopril.  She is asymptomatic so it is not clear if midodrine is actually helping.  Will consider stopping this medication.  Appropriate labs ordered.

## 2019-06-27 NOTE — LETTER
"     [facility]   [address]  [place] [address]  Phone: [phone]  Fax: [phone]              Judit Ramsey  6/7/1930    Encounter Date: 6/27/2019    Eleazar Wynn M.D.          PROGRESS NOTE:  Chief Complaint   Patient presents with   • Hypertension     Follow up    • Chest Pain       Subjective:   Judit Ramsey is a 89 y.o. female who presents today for follow-up of left bundle branch block and hypertension.  She is brought in with her  and daughter today.  The patient is very inactive and spends most of her day in the wheelchair.  She has had no further falls.  She is was is seen by pulmonary.  Medications reviewed with patient and daughter today.  Patient seems unhappy and admits to depression.    Past Medical History:   Diagnosis Date   • Anesthesia     Pt and spouse state, anesthesia causes her seizures, she has had several seizures at least 4 in PACU, following several different surgeries.  She has had colonoscopies and cataract surgery and did fine.   • Arthritis     bilateral knees   • Asthma    • Breath shortness     02 @ 2L con't   • Cancer ([facility])     skin cancer   • Cataract     bilateral IOL   • CHEST PAIN 5/3/2011   • CHF (congestive heart failure) ([facility]) 8/3/2011   • Esophageal reflux 3/19/2008   • Fatigue 5/3/2011   • Heart burn     \"chronic pain in my stomach\"   • Hiatus hernia syndrome    • HLD (hyperlipidemia) 3/29/2011   • Hypertension    • Incontinence    • Left bundle branch block 3/29/2011   • Osteoarthritis 3/19/2008   • Pneumonia     2010   • Seizure ([facility]) 9/2/2008    Pt states some anesthesia causes seizures, last seizure 4 years ago   • Subjective visual disturbance, unspecified 5/3/2011   • Unspecified vertiginous syndromes and labyrinthine disorders 3/19/2008   • Urinary bladder disorder      Past Surgical History:   Procedure Laterality Date   • ECTROPIAN REPAIR Left 8/3/2016    Procedure: ECTROPIAN REPAIR " - LOWER LID WITH LATERAL TARSAL STRIP;  Surgeon: Daniel Pimentel M.D.;  Location: [facility];  Service:    • KNEE REPLACEMENT, TOTAL  2010    BILATERAL   • ABDOMINAL HYSTERECTOMY TOTAL     • CHOLECYSTECTOMY     • GENERAL LUNG SURGERY      LUNG SEGMENTAL RESECTION.     Family History   Problem Relation Age of Onset   • Stroke Mother    • Heart Disease Father      Social History     Social History   • Marital status:      Spouse name: N/A   • Number of children: N/A   • Years of education: N/A     Occupational History   • Not on file.     Social History Main Topics   • Smoking status: Never Smoker   • Smokeless tobacco: Never Used   • Alcohol use No   • Drug use: No   • Sexual activity: Not on file     Other Topics Concern   • Not on file     Social History Narrative   • No narrative on file     Allergies   Allergen Reactions   • Ciprofloxacin      SEIZURES.   • Prednisone Unspecified     dizziness     Outpatient Encounter Prescriptions as of 6/27/2019   Medication Sig Dispense Refill   • darifenacin (ENABLEX) 15 MG SR tablet   5   • divalproex (DEPAKOTE) 500 MG Tablet Delayed Response   5   • famotidine (PEPCID) 20 MG Tab   11   • fluticasone (FLONASE) 50 MCG/ACT nasal spray   4   • ACIDOPHILUS LACTOBACILLUS PO   11   • meloxicam (MOBIC) 7.5 MG Tab   11   • methenamine hip (HIPPREX) 1 GM Tab   11   • midodrine (PROAMATINE) 5 MG Tab   0   • Multiple Vitamins-Minerals (PRESERVISION AREDS) Cap   5   • sodium chloride (SALT) 1 GM Tab   5   • D3 SUPER STRENGTH 2000 units Cap   11   • ADVAIR DISKUS 100-50 MCG/DOSE AEROSOL POWDER, BREATH ACTIVATED USE 1 INHALATION TWO TIMES  DAILY , RINSE MOUTH AFTER  EACH USE 3 Inhaler 3   • atorvastatin (LIPITOR) 40 MG Tab Take 1 Tab by mouth every evening. (Patient taking differently: Take 10 mg by mouth every evening.) 90 Tab 3   • OXcarbazepine (TRILEPTAL) 300 MG Tab Take 1 tablet by mouth two  times daily 180 Tab 3   • divalproex ER (DEPAKOTE ER) 500 MG TABLET SR  "24 HR Take 2 Tabs by mouth every bedtime. 180 Tab 3   • gabapentin (NEURONTIN) 100 MG Cap Take 100 mg by mouth 2 Times a Day.     • pentosan (ELMIRON) 100 MG Cap Take 100 mg by mouth 3 times a day before meals.     • aspirin 81 MG tablet Take 81 mg by mouth every day.     • omeprazole (PRILOSEC) 20 MG CPDR Take 20 mg by mouth every day.       • [DISCONTINUED] lisinopril (PRINIVIL) 2.5 MG Tab   2   • [DISCONTINUED] potassium chloride SA (KDUR) 20 MEQ Tab CR   11   • azithromycin (ZITHROMAX Z-SALVADOR) 250 MG Tab Take 2 tablets on day 1. Then take 1 tablet a day for 4 days. (Patient not taking: Reported on 6/27/2019) 6 Tab 0   • [DISCONTINUED] losartan (COZAAR) 25 MG Tab Take 25 mg by mouth every day.  4   • azithromycin (ZITHROMAX Z-SALVADOR) 250 MG Tab Take 2 tablets on day 1. Then take 1 tablet a day for 4 days. (Patient not taking: Reported on 12/28/2018) 6 Tab 1   • Probiotic Product (PROBIOTIC DAILY PO) Take  by mouth.     • Omega-3 Fatty Acids (OMEGA 3 PO) Take  by mouth.     • Ascorbic Acid (VITAMIN C) 100 MG Tab Take  by mouth.     • VITAMIN D, CHOLECALCIFEROL, PO Take  by mouth.     • Mirabegron ER (MYRBETRIQ) 50 MG TABLET SR 24 HR Take  by mouth.       No facility-administered encounter medications on file as of 6/27/2019.      Review of Systems   Constitutional: Negative for weight loss.   HENT: Negative.    Respiratory: Positive for sputum production.    Cardiovascular: Positive for leg swelling. Negative for chest pain.   Gastrointestinal: Negative.    Genitourinary: Negative for hematuria.   Musculoskeletal: Negative for falls.   Skin: Negative.    Neurological: Positive for weakness. Negative for loss of consciousness.   Endo/Heme/Allergies: Negative.  Does not bruise/bleed easily.   Psychiatric/Behavioral: Positive for depression.        Objective:   /60 (BP Location: Left arm, Patient Position: Sitting, BP Cuff Size: Adult)   Pulse 98   Ht 1.702 m (5' 7\")   Wt 77.1 kg (170 lb)   LMP  (LMP Unknown)   " SpO2 95%   BMI 26.63 kg/m²      Physical Exam   Constitutional: She is oriented to person, place, and time. No distress.   Frail, uses a walker.   HENT:   Head: Normocephalic and atraumatic.   Eyes: Pupils are equal, round, and reactive to light. No scleral icterus.   Neck: No JVD present.   Cardiovascular: Normal rate and regular rhythm.    Pulmonary/Chest: Effort normal. No respiratory distress.   Abdominal: Soft. She exhibits no distension. There is no tenderness.   Musculoskeletal: She exhibits edema.   2+ edema bilaterally.   Neurological: She is alert and oriented to person, place, and time.   Skin: Skin is warm and dry.       Assessment:     1. Atypical chest pain     2. Left bundle branch block     3. Mixed hyperlipidemia  Comp Metabolic Panel    CBC WITH DIFFERENTIAL    THYROID PANEL   4. Chronic fatigue  Comp Metabolic Panel    CBC WITH DIFFERENTIAL    THYROID PANEL       Medical Decision Making:  Today's Assessment / Status / Plan:   History of left bundle branch block: No history of syncope or presyncope.  History of hyperlipidemia: At nearly age 89 would recommend stopping her statins as there is no history of documented coronary disease.  Hypotension: Patient's medications were reviewed.  She is still on low-dose lisinopril was stopped this.  Would also stop her potassium supplementation and check potassium levels.  Consider stopping her midodrine in the future as she is asymptomatic.  She is happy about getting rid of some of her medications.  She did have a bit of a smile at the end of the appointment today.    Edema: I think this is multifactorial and due in large part to sitting in his chair all day.  Her daughter is not too keen on on the idea of compression stockings.  Recommend she elevate her feet when she naps in a recliner.  Would try and keep her off diuretics because of her hypotension.    Hypotension: By my reading blood pressure is 85 systolic.  Blood pressure at home have been low.  As  noted above we will stop lisinopril.  She is asymptomatic so it is not clear if midodrine is actually helping.  Will consider stopping this medication.  Appropriate labs ordered.      No Recipients

## 2019-06-28 ENCOUNTER — HOSPITAL ENCOUNTER (OUTPATIENT)
Facility: MEDICAL CENTER | Age: 84
End: 2019-06-30
Attending: EMERGENCY MEDICINE | Admitting: HOSPITALIST
Payer: MEDICARE

## 2019-06-28 ENCOUNTER — APPOINTMENT (OUTPATIENT)
Dept: RADIOLOGY | Facility: MEDICAL CENTER | Age: 84
End: 2019-06-28
Attending: EMERGENCY MEDICINE
Payer: MEDICARE

## 2019-06-28 DIAGNOSIS — E87.1 HYPONATREMIA: ICD-10-CM

## 2019-06-28 DIAGNOSIS — N30.01 ACUTE CYSTITIS WITH HEMATURIA: ICD-10-CM

## 2019-06-28 DIAGNOSIS — I95.9 HYPOTENSION, UNSPECIFIED HYPOTENSION TYPE: ICD-10-CM

## 2019-06-28 PROBLEM — A41.9 SEPSIS DUE TO URINARY TRACT INFECTION (HCC): Status: ACTIVE | Noted: 2019-06-28

## 2019-06-28 PROBLEM — D69.6 THROMBOCYTOPENIA (HCC): Status: ACTIVE | Noted: 2019-06-28

## 2019-06-28 PROBLEM — N39.0 SEPSIS DUE TO URINARY TRACT INFECTION (HCC): Status: ACTIVE | Noted: 2019-06-28

## 2019-06-28 PROBLEM — I49.1 PAC (PREMATURE ATRIAL CONTRACTION): Status: ACTIVE | Noted: 2019-06-28

## 2019-06-28 PROBLEM — E83.42 HYPOMAGNESEMIA: Status: ACTIVE | Noted: 2019-06-28

## 2019-06-28 LAB
ALBUMIN SERPL BCP-MCNC: 2.9 G/DL (ref 3.2–4.9)
ALBUMIN/GLOB SERPL: 1.3 G/DL
ALP SERPL-CCNC: 40 U/L (ref 30–99)
ALT SERPL-CCNC: 7 U/L (ref 2–50)
ANION GAP SERPL CALC-SCNC: 8 MMOL/L (ref 0–11.9)
APPEARANCE UR: ABNORMAL
AST SERPL-CCNC: 13 U/L (ref 12–45)
BACTERIA #/AREA URNS HPF: ABNORMAL /HPF
BASOPHILS # BLD AUTO: 0.4 % (ref 0–1.8)
BASOPHILS # BLD: 0.03 K/UL (ref 0–0.12)
BILIRUB SERPL-MCNC: 0.6 MG/DL (ref 0.1–1.5)
BILIRUB UR QL STRIP.AUTO: NEGATIVE
BNP SERPL-MCNC: 226 PG/ML (ref 0–100)
BUN SERPL-MCNC: 11 MG/DL (ref 8–22)
CALCIUM SERPL-MCNC: 8.1 MG/DL (ref 8.4–10.2)
CHLORIDE SERPL-SCNC: 101 MMOL/L (ref 96–112)
CO2 SERPL-SCNC: 19 MMOL/L (ref 20–33)
COLOR UR: YELLOW
CREAT SERPL-MCNC: 0.74 MG/DL (ref 0.5–1.4)
EKG IMPRESSION: NORMAL
EOSINOPHIL # BLD AUTO: 0.03 K/UL (ref 0–0.51)
EOSINOPHIL NFR BLD: 0.4 % (ref 0–6.9)
EPI CELLS #/AREA URNS HPF: ABNORMAL /HPF
ERYTHROCYTE [DISTWIDTH] IN BLOOD BY AUTOMATED COUNT: 44.9 FL (ref 35.9–50)
GLOBULIN SER CALC-MCNC: 2.2 G/DL (ref 1.9–3.5)
GLUCOSE SERPL-MCNC: 95 MG/DL (ref 65–99)
GLUCOSE UR STRIP.AUTO-MCNC: NEGATIVE MG/DL
HCT VFR BLD AUTO: 36.7 % (ref 37–47)
HGB BLD-MCNC: 12.6 G/DL (ref 12–16)
IMM GRANULOCYTES # BLD AUTO: 0.04 K/UL (ref 0–0.11)
IMM GRANULOCYTES NFR BLD AUTO: 0.5 % (ref 0–0.9)
KETONES UR STRIP.AUTO-MCNC: NEGATIVE MG/DL
LACTATE BLD-SCNC: 1.3 MMOL/L (ref 0.5–2)
LACTATE BLD-SCNC: 1.6 MMOL/L (ref 0.5–2)
LACTATE BLD-SCNC: 1.8 MMOL/L (ref 0.5–2)
LEUKOCYTE ESTERASE UR QL STRIP.AUTO: ABNORMAL
LYMPHOCYTES # BLD AUTO: 1.98 K/UL (ref 1–4.8)
LYMPHOCYTES NFR BLD: 24 % (ref 22–41)
MAGNESIUM SERPL-MCNC: 1 MG/DL (ref 1.5–2.5)
MCH RBC QN AUTO: 32.1 PG (ref 27–33)
MCHC RBC AUTO-ENTMCNC: 34.3 G/DL (ref 33.6–35)
MCV RBC AUTO: 93.4 FL (ref 81.4–97.8)
MICRO URNS: ABNORMAL
MONOCYTES # BLD AUTO: 0.66 K/UL (ref 0–0.85)
MONOCYTES NFR BLD AUTO: 8 % (ref 0–13.4)
NEUTROPHILS # BLD AUTO: 5.51 K/UL (ref 2–7.15)
NEUTROPHILS NFR BLD: 66.7 % (ref 44–72)
NITRITE UR QL STRIP.AUTO: NEGATIVE
NRBC # BLD AUTO: 0 K/UL
NRBC BLD-RTO: 0 /100 WBC
PH UR STRIP.AUTO: 7 [PH]
PLATELET # BLD AUTO: 74 K/UL (ref 164–446)
PMV BLD AUTO: 9.4 FL (ref 9–12.9)
POTASSIUM SERPL-SCNC: 3.4 MMOL/L (ref 3.6–5.5)
PROT SERPL-MCNC: 5.1 G/DL (ref 6–8.2)
PROT UR QL STRIP: NEGATIVE MG/DL
RBC # BLD AUTO: 3.93 M/UL (ref 4.2–5.4)
RBC # URNS HPF: ABNORMAL /HPF
RBC UR QL AUTO: ABNORMAL
SODIUM SERPL-SCNC: 128 MMOL/L (ref 135–145)
SP GR UR STRIP.AUTO: 1.01
T4 FREE SERPL-MCNC: 0.72 NG/DL (ref 0.58–1.64)
TROPONIN I SERPL-MCNC: <0.02 NG/ML (ref 0–0.04)
TSH SERPL DL<=0.005 MIU/L-ACNC: 1.28 UIU/ML (ref 0.38–5.33)
WBC # BLD AUTO: 8.3 K/UL (ref 4.8–10.8)
WBC #/AREA URNS HPF: ABNORMAL /HPF

## 2019-06-28 PROCEDURE — 36415 COLL VENOUS BLD VENIPUNCTURE: CPT

## 2019-06-28 PROCEDURE — G0378 HOSPITAL OBSERVATION PER HR: HCPCS

## 2019-06-28 PROCEDURE — 81001 URINALYSIS AUTO W/SCOPE: CPT

## 2019-06-28 PROCEDURE — 96365 THER/PROPH/DIAG IV INF INIT: CPT

## 2019-06-28 PROCEDURE — 85025 COMPLETE CBC W/AUTO DIFF WBC: CPT

## 2019-06-28 PROCEDURE — 71045 X-RAY EXAM CHEST 1 VIEW: CPT

## 2019-06-28 PROCEDURE — A9270 NON-COVERED ITEM OR SERVICE: HCPCS | Performed by: HOSPITALIST

## 2019-06-28 PROCEDURE — 700105 HCHG RX REV CODE 258: Performed by: HOSPITALIST

## 2019-06-28 PROCEDURE — 93005 ELECTROCARDIOGRAM TRACING: CPT | Performed by: EMERGENCY MEDICINE

## 2019-06-28 PROCEDURE — A9270 NON-COVERED ITEM OR SERVICE: HCPCS

## 2019-06-28 PROCEDURE — 96367 TX/PROPH/DG ADDL SEQ IV INF: CPT

## 2019-06-28 PROCEDURE — 84484 ASSAY OF TROPONIN QUANT: CPT

## 2019-06-28 PROCEDURE — 87040 BLOOD CULTURE FOR BACTERIA: CPT | Mod: 91

## 2019-06-28 PROCEDURE — 96366 THER/PROPH/DIAG IV INF ADDON: CPT

## 2019-06-28 PROCEDURE — 94760 N-INVAS EAR/PLS OXIMETRY 1: CPT

## 2019-06-28 PROCEDURE — 83605 ASSAY OF LACTIC ACID: CPT

## 2019-06-28 PROCEDURE — 83880 ASSAY OF NATRIURETIC PEPTIDE: CPT

## 2019-06-28 PROCEDURE — 87186 SC STD MICRODIL/AGAR DIL: CPT

## 2019-06-28 PROCEDURE — 84439 ASSAY OF FREE THYROXINE: CPT

## 2019-06-28 PROCEDURE — 83735 ASSAY OF MAGNESIUM: CPT

## 2019-06-28 PROCEDURE — 700102 HCHG RX REV CODE 250 W/ 637 OVERRIDE(OP)

## 2019-06-28 PROCEDURE — 99285 EMERGENCY DEPT VISIT HI MDM: CPT

## 2019-06-28 PROCEDURE — 84443 ASSAY THYROID STIM HORMONE: CPT

## 2019-06-28 PROCEDURE — 87150 DNA/RNA AMPLIFIED PROBE: CPT | Mod: 91

## 2019-06-28 PROCEDURE — 700105 HCHG RX REV CODE 258: Performed by: EMERGENCY MEDICINE

## 2019-06-28 PROCEDURE — 99218 PR INITIAL OBSERVATION CARE,LEVL I: CPT | Performed by: HOSPITALIST

## 2019-06-28 PROCEDURE — 87077 CULTURE AEROBIC IDENTIFY: CPT | Mod: 91

## 2019-06-28 PROCEDURE — 80053 COMPREHEN METABOLIC PANEL: CPT

## 2019-06-28 PROCEDURE — 700111 HCHG RX REV CODE 636 W/ 250 OVERRIDE (IP): Performed by: HOSPITALIST

## 2019-06-28 PROCEDURE — 87086 URINE CULTURE/COLONY COUNT: CPT

## 2019-06-28 PROCEDURE — 700102 HCHG RX REV CODE 250 W/ 637 OVERRIDE(OP): Performed by: HOSPITALIST

## 2019-06-28 PROCEDURE — 700111 HCHG RX REV CODE 636 W/ 250 OVERRIDE (IP): Performed by: EMERGENCY MEDICINE

## 2019-06-28 RX ORDER — ONDANSETRON 2 MG/ML
4 INJECTION INTRAMUSCULAR; INTRAVENOUS EVERY 4 HOURS PRN
Status: DISCONTINUED | OUTPATIENT
Start: 2019-06-28 | End: 2019-06-30 | Stop reason: HOSPADM

## 2019-06-28 RX ORDER — AMOXICILLIN 250 MG
2 CAPSULE ORAL 2 TIMES DAILY
Status: DISCONTINUED | OUTPATIENT
Start: 2019-06-28 | End: 2019-06-30 | Stop reason: HOSPADM

## 2019-06-28 RX ORDER — DARIFENACIN HYDROBROMIDE 15 MG/1
15 TABLET, EXTENDED RELEASE ORAL DAILY
Status: ON HOLD | COMMUNITY
End: 2020-01-16

## 2019-06-28 RX ORDER — LISINOPRIL 5 MG/1
2.5 TABLET ORAL DAILY
Status: ON HOLD | COMMUNITY
End: 2019-06-30

## 2019-06-28 RX ORDER — MULTIVIT-MIN/IRON/FOLIC ACID/K 18-600-40
1 CAPSULE ORAL DAILY
Status: ON HOLD | COMMUNITY
End: 2020-01-12

## 2019-06-28 RX ORDER — ASPIRIN 81 MG/1
81 TABLET, CHEWABLE ORAL DAILY
Status: ON HOLD | COMMUNITY
End: 2020-01-16

## 2019-06-28 RX ORDER — METHENAMINE HIPPURATE 1000 MG/1
1 TABLET ORAL DAILY
COMMUNITY

## 2019-06-28 RX ORDER — ONDANSETRON 4 MG/1
4 TABLET, ORALLY DISINTEGRATING ORAL EVERY 4 HOURS PRN
Status: DISCONTINUED | OUTPATIENT
Start: 2019-06-28 | End: 2019-06-30 | Stop reason: HOSPADM

## 2019-06-28 RX ORDER — LACTOBACILLUS RHAMNOSUS GG 10B CELL
1 CAPSULE ORAL
Status: DISCONTINUED | OUTPATIENT
Start: 2019-06-29 | End: 2019-06-30 | Stop reason: HOSPADM

## 2019-06-28 RX ORDER — SODIUM CHLORIDE 9 MG/ML
30 INJECTION, SOLUTION INTRAVENOUS
Status: DISCONTINUED | OUTPATIENT
Start: 2019-06-28 | End: 2019-06-30 | Stop reason: HOSPADM

## 2019-06-28 RX ORDER — SODIUM CHLORIDE 1 G/1
1 TABLET ORAL 2 TIMES DAILY
Status: DISCONTINUED | OUTPATIENT
Start: 2019-06-28 | End: 2019-06-30 | Stop reason: HOSPADM

## 2019-06-28 RX ORDER — MAGNESIUM SULFATE HEPTAHYDRATE 40 MG/ML
4 INJECTION, SOLUTION INTRAVENOUS ONCE
Status: COMPLETED | OUTPATIENT
Start: 2019-06-28 | End: 2019-06-28

## 2019-06-28 RX ORDER — ACETAMINOPHEN 500 MG
500 TABLET ORAL 3 TIMES DAILY
COMMUNITY

## 2019-06-28 RX ORDER — OXYBUTYNIN CHLORIDE 5 MG/1
10 TABLET, EXTENDED RELEASE ORAL DAILY
Status: DISCONTINUED | OUTPATIENT
Start: 2019-06-29 | End: 2019-06-30 | Stop reason: HOSPADM

## 2019-06-28 RX ORDER — BISACODYL 10 MG
10 SUPPOSITORY, RECTAL RECTAL
Status: DISCONTINUED | OUTPATIENT
Start: 2019-06-28 | End: 2019-06-30 | Stop reason: HOSPADM

## 2019-06-28 RX ORDER — FLUTICASONE PROPIONATE 50 MCG
2 SPRAY, SUSPENSION (ML) NASAL DAILY
Status: DISCONTINUED | OUTPATIENT
Start: 2019-06-29 | End: 2019-06-30 | Stop reason: HOSPADM

## 2019-06-28 RX ORDER — OXCARBAZEPINE 300 MG/1
300 TABLET, FILM COATED ORAL DAILY
COMMUNITY

## 2019-06-28 RX ORDER — FAMOTIDINE 20 MG/1
20 TABLET, FILM COATED ORAL 2 TIMES DAILY
Status: DISCONTINUED | OUTPATIENT
Start: 2019-06-28 | End: 2019-06-30 | Stop reason: HOSPADM

## 2019-06-28 RX ORDER — SODIUM CHLORIDE 9 MG/ML
1000 INJECTION, SOLUTION INTRAVENOUS
Status: DISCONTINUED | OUTPATIENT
Start: 2019-06-28 | End: 2019-06-30 | Stop reason: HOSPADM

## 2019-06-28 RX ORDER — DIVALPROEX SODIUM 250 MG/1
TABLET, EXTENDED RELEASE ORAL
Status: COMPLETED
Start: 2019-06-28 | End: 2019-06-28

## 2019-06-28 RX ORDER — POLYETHYLENE GLYCOL 3350 17 G/17G
1 POWDER, FOR SOLUTION ORAL
Status: DISCONTINUED | OUTPATIENT
Start: 2019-06-28 | End: 2019-06-30 | Stop reason: HOSPADM

## 2019-06-28 RX ORDER — MIDODRINE HYDROCHLORIDE 2.5 MG/1
5 TABLET ORAL 2 TIMES DAILY
Status: DISCONTINUED | OUTPATIENT
Start: 2019-06-28 | End: 2019-06-30 | Stop reason: HOSPADM

## 2019-06-28 RX ORDER — MELOXICAM 7.5 MG/1
7.5 TABLET ORAL DAILY
Status: ON HOLD | COMMUNITY
End: 2020-01-16

## 2019-06-28 RX ORDER — GABAPENTIN 100 MG/1
100 CAPSULE ORAL DAILY
COMMUNITY

## 2019-06-28 RX ORDER — ACETAMINOPHEN 500 MG
500 TABLET ORAL 3 TIMES DAILY
Status: DISCONTINUED | OUTPATIENT
Start: 2019-06-28 | End: 2019-06-30 | Stop reason: HOSPADM

## 2019-06-28 RX ORDER — MIDODRINE HYDROCHLORIDE 5 MG/1
5 TABLET ORAL 2 TIMES DAILY
Status: ON HOLD | COMMUNITY
End: 2020-01-16

## 2019-06-28 RX ORDER — HEPARIN SODIUM 5000 [USP'U]/ML
5000 INJECTION, SOLUTION INTRAVENOUS; SUBCUTANEOUS EVERY 8 HOURS
Status: DISCONTINUED | OUTPATIENT
Start: 2019-06-28 | End: 2019-06-28

## 2019-06-28 RX ORDER — ALBUTEROL SULFATE 90 UG/1
2 AEROSOL, METERED RESPIRATORY (INHALATION) EVERY 6 HOURS PRN
COMMUNITY
End: 2019-07-06 | Stop reason: CLARIF

## 2019-06-28 RX ORDER — ATORVASTATIN CALCIUM 10 MG/1
10 TABLET, FILM COATED ORAL NIGHTLY
COMMUNITY
End: 2019-07-06 | Stop reason: CLARIF

## 2019-06-28 RX ORDER — FLUTICASONE PROPIONATE 50 MCG
2 SPRAY, SUSPENSION (ML) NASAL DAILY
Status: ON HOLD | COMMUNITY
End: 2020-01-12

## 2019-06-28 RX ORDER — OMEPRAZOLE 20 MG/1
20 CAPSULE, DELAYED RELEASE ORAL DAILY
Status: DISCONTINUED | OUTPATIENT
Start: 2019-06-29 | End: 2019-06-30 | Stop reason: HOSPADM

## 2019-06-28 RX ORDER — SODIUM CHLORIDE 9 MG/ML
1000 INJECTION, SOLUTION INTRAVENOUS ONCE
Status: COMPLETED | OUTPATIENT
Start: 2019-06-28 | End: 2019-06-28

## 2019-06-28 RX ORDER — BUDESONIDE AND FORMOTEROL FUMARATE DIHYDRATE 80; 4.5 UG/1; UG/1
2 AEROSOL RESPIRATORY (INHALATION) 2 TIMES DAILY
Status: DISCONTINUED | OUTPATIENT
Start: 2019-06-28 | End: 2019-06-30 | Stop reason: HOSPADM

## 2019-06-28 RX ORDER — LISINOPRIL 2.5 MG/1
2.5 TABLET ORAL DAILY
Status: SHIPPED | COMMUNITY
End: 2019-06-28

## 2019-06-28 RX ORDER — DIVALPROEX SODIUM 250 MG/1
1000 TABLET, EXTENDED RELEASE ORAL
Status: DISCONTINUED | OUTPATIENT
Start: 2019-06-28 | End: 2019-06-30 | Stop reason: HOSPADM

## 2019-06-28 RX ORDER — ATORVASTATIN CALCIUM 10 MG/1
10 TABLET, FILM COATED ORAL NIGHTLY
Status: DISCONTINUED | OUTPATIENT
Start: 2019-06-28 | End: 2019-06-30 | Stop reason: HOSPADM

## 2019-06-28 RX ORDER — FAMOTIDINE 20 MG/1
20 TABLET, FILM COATED ORAL 2 TIMES DAILY
COMMUNITY

## 2019-06-28 RX ORDER — GABAPENTIN 100 MG/1
100 CAPSULE ORAL DAILY
Status: DISCONTINUED | OUTPATIENT
Start: 2019-06-29 | End: 2019-06-30 | Stop reason: HOSPADM

## 2019-06-28 RX ORDER — ASPIRIN 81 MG/1
81 TABLET, CHEWABLE ORAL DAILY
Status: DISCONTINUED | OUTPATIENT
Start: 2019-06-29 | End: 2019-06-30 | Stop reason: HOSPADM

## 2019-06-28 RX ORDER — MELOXICAM 7.5 MG/1
7.5 TABLET ORAL DAILY
Status: DISCONTINUED | OUTPATIENT
Start: 2019-06-29 | End: 2019-06-30 | Stop reason: HOSPADM

## 2019-06-28 RX ORDER — SODIUM CHLORIDE 1 G/1
1 TABLET ORAL 2 TIMES DAILY
Status: ON HOLD | COMMUNITY
End: 2020-01-16

## 2019-06-28 RX ORDER — SODIUM CHLORIDE 9 MG/ML
INJECTION, SOLUTION INTRAVENOUS CONTINUOUS
Status: DISCONTINUED | OUTPATIENT
Start: 2019-06-28 | End: 2019-06-30 | Stop reason: HOSPADM

## 2019-06-28 RX ORDER — DARIFENACIN HYDROBROMIDE 15 MG/1
15 TABLET, EXTENDED RELEASE ORAL DAILY
Status: DISCONTINUED | OUTPATIENT
Start: 2019-06-29 | End: 2019-06-28

## 2019-06-28 RX ORDER — OXCARBAZEPINE 300 MG/1
300 TABLET, FILM COATED ORAL DAILY
Status: DISCONTINUED | OUTPATIENT
Start: 2019-06-29 | End: 2019-06-30 | Stop reason: HOSPADM

## 2019-06-28 RX ORDER — DIVALPROEX SODIUM 500 MG/1
1000 TABLET, EXTENDED RELEASE ORAL
COMMUNITY

## 2019-06-28 RX ORDER — OMEPRAZOLE 20 MG/1
20 CAPSULE, DELAYED RELEASE ORAL DAILY
Status: ON HOLD | COMMUNITY
End: 2020-01-16

## 2019-06-28 RX ADMIN — DIVALPROEX SODIUM 1000 MG: 250 TABLET, EXTENDED RELEASE ORAL at 20:38

## 2019-06-28 RX ADMIN — BUDESONIDE AND FORMOTEROL FUMARATE DIHYDRATE 2 PUFF: 80; 4.5 AEROSOL RESPIRATORY (INHALATION) at 19:43

## 2019-06-28 RX ADMIN — SODIUM CHLORIDE: 9 INJECTION, SOLUTION INTRAVENOUS at 19:46

## 2019-06-28 RX ADMIN — FAMOTIDINE 20 MG: 20 TABLET, FILM COATED ORAL at 20:33

## 2019-06-28 RX ADMIN — ACETAMINOPHEN 500 MG: 500 TABLET, FILM COATED ORAL at 20:35

## 2019-06-28 RX ADMIN — SODIUM CHLORIDE 1000 ML: 9 INJECTION, SOLUTION INTRAVENOUS at 16:42

## 2019-06-28 RX ADMIN — SENNOSIDES,DOCUSATE SODIUM 2 TABLET: 8.6; 5 TABLET, FILM COATED ORAL at 20:33

## 2019-06-28 RX ADMIN — SODIUM CHLORIDE 1000 ML: 9 INJECTION, SOLUTION INTRAVENOUS at 15:30

## 2019-06-28 RX ADMIN — CEFTRIAXONE SODIUM 2 G: 2 INJECTION, POWDER, FOR SOLUTION INTRAMUSCULAR; INTRAVENOUS at 17:41

## 2019-06-28 RX ADMIN — MAGNESIUM SULFATE IN WATER 4 G: 40 INJECTION, SOLUTION INTRAVENOUS at 19:46

## 2019-06-28 RX ADMIN — MIDODRINE HYDROCHLORIDE 5 MG: 2.5 TABLET ORAL at 20:32

## 2019-06-28 RX ADMIN — ATORVASTATIN CALCIUM 10 MG: 10 TABLET, FILM COATED ORAL at 20:32

## 2019-06-28 RX ADMIN — SODIUM CHLORIDE TAB 1 GM 1 G: 1 TAB at 20:32

## 2019-06-28 ASSESSMENT — ENCOUNTER SYMPTOMS
VOMITING: 0
COUGH: 1
DIARRHEA: 0
SHORTNESS OF BREATH: 0
CONSTIPATION: 0
HEADACHES: 1
CHILLS: 0
WHEEZING: 0
NAUSEA: 0
NECK PAIN: 1
FEVER: 0

## 2019-06-28 ASSESSMENT — LIFESTYLE VARIABLES
EVER_SMOKED: NEVER
ALCOHOL_USE: NO

## 2019-06-28 NOTE — ED PROVIDER NOTES
"ED Provider Note    CHIEF COMPLAINT  Chief Complaint   Patient presents with   • Weakness   • Hypotension         HPI  Judit Ramsey is a 89 y.o. female who presents to the ED with hypotension and irregular heart rate.  The patient is very sedentary actually saw the cardiologist yesterday.  Patient had hypotension there, systolics of 85 yesterday.  Physical therapy came over today and noted the patient had low blood pressure..  So they called the ambulance and sent the patient here, the ambulance noted the patient had an irregular heart rate.  Overall the patient feels about her baseline, is overall tired, this is chronic for her.  She denies any chest pain, is chronically on oxygen, slight cough, slight increase in shortness of breath.  No abdominal pain, nausea vomiting.  The patient does state that she has some dysuria, this is chronic.    REVIEW OF SYSTEMS  See HPI for further details. All other systems are negative.     PAST MEDICAL HISTORY  Past Medical History:   Diagnosis Date   • Anesthesia     Pt and spouse state, anesthesia causes her seizures, she has had several seizures at least 4 in PACU, following several different surgeries.  She has had colonoscopies and cataract surgery and did fine.   • Arthritis     bilateral knees   • Asthma    • Breath shortness     02 @ 2L con't   • Cancer (HCC)     skin cancer   • Cataract     bilateral IOL   • CHEST PAIN 5/3/2011   • CHF (congestive heart failure) ([facility]) 8/3/2011   • Esophageal reflux 3/19/2008   • Fatigue 5/3/2011   • Heart burn     \"chronic pain in my stomach\"   • Hiatus hernia syndrome    • HLD (hyperlipidemia) 3/29/2011   • Hypertension    • Incontinence    • Left bundle branch block 3/29/2011   • Osteoarthritis 3/19/2008   • Pneumonia     2010   • Seizure ([facility]) 9/2/2008    Pt states some anesthesia causes seizures, last seizure 4 years ago   • Subjective visual disturbance, unspecified 5/3/2011   • Unspecified vertiginous syndromes and labyrinthine " disorders 3/19/2008   • Urinary bladder disorder        FAMILY HISTORY  Family History   Problem Relation Age of Onset   • Stroke Mother    • Heart Disease Father        SOCIAL HISTORY  Social History     Social History   • Marital status:      Spouse name: N/A   • Number of children: N/A   • Years of education: N/A     Social History Main Topics   • Smoking status: Never Smoker   • Smokeless tobacco: Never Used   • Alcohol use No   • Drug use: No   • Sexual activity: Not on file     Other Topics Concern   • Not on file     Social History Narrative   • No narrative on file       SURGICAL HISTORY  Past Surgical History:   Procedure Laterality Date   • ECTROPIAN REPAIR Left 8/3/2016    Procedure: ECTROPIAN REPAIR - LOWER LID WITH LATERAL TARSAL STRIP;  Surgeon: Daniel Pimentel M.D.;  Location: [facility];  Service:    • KNEE REPLACEMENT, TOTAL  2010    BILATERAL   • ABDOMINAL HYSTERECTOMY TOTAL     • CHOLECYSTECTOMY     • GENERAL LUNG SURGERY      LUNG SEGMENTAL RESECTION.       CURRENT MEDICATIONS  Home Medications     Reviewed by Prabha Taylor R.N. (Registered Nurse) on 06/28/19 at 2046  Med List Status: Complete   Medication Last Dose Status   acetaminophen (TYLENOL) 500 MG Tab 6/28/2019 Active   albuterol 108 (90 Base) MCG/ACT Aero Soln inhalation aerosol unk Active   aspirin (ASA) 81 MG Chew Tab chewable tablet 6/28/2019 Active   atorvastatin (LIPITOR) 10 MG Tab 6/27/2019 Active   Cholecalciferol (VITAMIN D) 2000 units Cap 6/28/2019 Active   darifenacin (ENABLEX) 15 MG SR tablet 6/28/2019 Active   divalproex ER (DEPAKOTE ER) 500 MG TABLET SR 24 HR 6/27/2019 Active   famotidine (PEPCID) 20 MG Tab 6/28/2019 Active   fluticasone (FLONASE) 50 MCG/ACT nasal spray 6/28/2019 Active   fluticasone-salmeterol (ADVAIR) 100-50 MCG/DOSE AEROSOL POWDER, BREATH ACTIVATED 6/28/2019 Active   gabapentin (NEURONTIN) 100 MG Cap 6/28/2019 Active   LACTOBACILLUS PO 6/28/2019 Active   lisinopril (PRINIVIL) 5 MG  Tab 6/28/2019 Active   meloxicam (MOBIC) 7.5 MG Tab 6/28/2019 Active   Menthol-Zinc Oxide (CALMOSEPTINE EX) UNK Active   methenamine hip (HIPPREX) 1 GM Tab 6/28/2019 Active   midodrine (PROAMATINE) 5 MG Tab 6/28/2019 Active   Multiple Vitamins-Minerals (PRESERVISION AREDS 2 PO) 6/28/2019 Active   omeprazole (PRILOSEC) 20 MG delayed-release capsule 6/28/2019 Active   OXcarbazepine (TRILEPTAL) 300 MG Tab 6/28/2019 Active   pentosan (ELMIRON) 100 MG Cap 6/28/2019 Active   sodium chloride (SALT) 1 GM Tab 6/28/2019 Active                ALLERGIES  Allergies   Allergen Reactions   • Ciprofloxacin      SEIZURES.   • Prednisone Unspecified     dizziness       PHYSICAL EXAM  VITAL SIGNS: /63   Pulse 82   Temp 36.3 °C (97.4 °F) (Oral)   Resp 19   Wt 78 kg (171 lb 15.3 oz)   LMP  (LMP Unknown)   SpO2 98%   BMI 26.93 kg/m²   Constitutional: Well developed, Well nourished, moderate distress, Non-toxic appearance.   HENT: Normocephalic, Atraumatic, Bilateral external ears normal, Oropharynx moist, No oral exudates, Nose normal.   Eyes: PERRLA, EOMI, Conjunctiva normal, No discharge.   Neck: Normal range of motion, No tenderness, Supple, No stridor.   Cardiovascular: Irregular heart rate, 2+ pulses  Thorax & Lungs: Crackles at bilateral bases  Abdomen: Bowel sounds normal, Soft, No tenderness, No masses, No pulsatile masses.   Skin: Warm, Dry, No erythema, No rash.   Back: No tenderness, No CVA tenderness.   Extremities: Intact distal pulses, No tenderness, No cyanosis, No clubbing.  Diffuse 2+ pitting edema  Neurologic: Alert & oriented x 3, Normal motor function, Normal sensory function, No focal deficits noted.    Results for orders placed or performed during the hospital encounter of 06/28/19   Lactic acid (lactate)   Result Value Ref Range    Lactic Acid 1.6 0.5 - 2.0 mmol/L   Lactic acid (lactate)   Result Value Ref Range    Lactic Acid 1.3 0.5 - 2.0 mmol/L   CBC WITH DIFFERENTIAL   Result Value Ref Range    WBC  8.3 4.8 - 10.8 K/uL    RBC 3.93 (L) 4.20 - 5.40 M/uL    Hemoglobin 12.6 12.0 - 16.0 g/dL    Hematocrit 36.7 (L) 37.0 - 47.0 %    MCV 93.4 81.4 - 97.8 fL    MCH 32.1 27.0 - 33.0 pg    MCHC 34.3 33.6 - 35.0 g/dL    RDW 44.9 35.9 - 50.0 fL    Platelet Count 74 (L) 164 - 446 K/uL    MPV 9.4 9.0 - 12.9 fL    Neutrophils-Polys 66.70 44.00 - 72.00 %    Lymphocytes 24.00 22.00 - 41.00 %    Monocytes 8.00 0.00 - 13.40 %    Eosinophils 0.40 0.00 - 6.90 %    Basophils 0.40 0.00 - 1.80 %    Immature Granulocytes 0.50 0.00 - 0.90 %    Nucleated RBC 0.00 /100 WBC    Neutrophils (Absolute) 5.51 2.00 - 7.15 K/uL    Lymphs (Absolute) 1.98 1.00 - 4.80 K/uL    Monos (Absolute) 0.66 0.00 - 0.85 K/uL    Eos (Absolute) 0.03 0.00 - 0.51 K/uL    Baso (Absolute) 0.03 0.00 - 0.12 K/uL    Immature Granulocytes (abs) 0.04 0.00 - 0.11 K/uL    NRBC (Absolute) 0.00 K/uL   COMP METABOLIC PANEL   Result Value Ref Range    Sodium 128 (L) 135 - 145 mmol/L    Potassium 3.4 (L) 3.6 - 5.5 mmol/L    Chloride 101 96 - 112 mmol/L    Co2 19 (L) 20 - 33 mmol/L    Anion Gap 8.0 0.0 - 11.9    Glucose 95 65 - 99 mg/dL    Bun 11 8 - 22 mg/dL    Creatinine 0.74 0.50 - 1.40 mg/dL    Calcium 8.1 (L) 8.4 - 10.2 mg/dL    AST(SGOT) 13 12 - 45 U/L    ALT(SGPT) 7 2 - 50 U/L    Alkaline Phosphatase 40 30 - 99 U/L    Total Bilirubin 0.6 0.1 - 1.5 mg/dL    Albumin 2.9 (L) 3.2 - 4.9 g/dL    Total Protein 5.1 (L) 6.0 - 8.2 g/dL    Globulin 2.2 1.9 - 3.5 g/dL    A-G Ratio 1.3 g/dL   URINALYSIS   Result Value Ref Range    Color Yellow     Character Cloudy (A)     Specific Gravity 1.015 <1.035    Ph 7.0 5.0 - 8.0    Glucose Negative Negative mg/dL    Ketones Negative Negative mg/dL    Protein Negative Negative mg/dL    Bilirubin Negative Negative    Nitrite Negative Negative    Leukocyte Esterase Large (A) Negative    Occult Blood Small (A) Negative    Micro Urine Req Microscopic    BTYPE NATRIURETIC PEPTIDE   Result Value Ref Range    B Natriuretic Peptide 226 (H) 0 - 100  pg/mL   TROPONIN   Result Value Ref Range    Troponin I <0.02 0.00 - 0.04 ng/mL   MAGNESIUM   Result Value Ref Range    Magnesium 1.0 (L) 1.5 - 2.5 mg/dL   TSH   Result Value Ref Range    TSH 1.280 0.380 - 5.330 uIU/mL   FREE THYROXINE   Result Value Ref Range    Free T-4 0.72 0.58 - 1.64 ng/dL   ESTIMATED GFR   Result Value Ref Range    GFR If African American >60 >60 mL/min/1.73 m 2    GFR If Non African American >60 >60 mL/min/1.73 m 2   URINE MICROSCOPIC (W/UA)   Result Value Ref Range    -150 (A) /hpf    RBC 2-5 (A) /hpf    Bacteria Many (A) None /hpf    Epithelial Cells Few Few /hpf   EKG - STAT   Result Value Ref Range    Report       [facility] Emergency Dept.    Test Date:  2019  Pt Name:    RACHEL STALLINGS                  Department: [facility]  MRN:        [id number]                      [facility]  Gender:     Female                       Technician: [id number]  :        1930                   Requested By:TARA SANTOS  Order #:    [id number]                    Reading MD: TARA SANTOS MD    Measurements  Intervals                                Axis  Rate:       113                          P:          80  MI:         188                          QRS:        -34  QRSD:       156                          T:          148  QT:         400  QTc:        549    Interpretive Statements  SINUS TACHYCARDIA WITH IRREGULAR RATE    LEFT BUNDLE BRANCH BLOCK  MULTIPLE ATRIAL PREMATURE COMPLEXES  Compared to ECG 2018 12:59:41  Sinus rhythm no longer present    Electronically Signed On 2019 16:20:01 PDT by TARA SANTOS MD           RADIOLOGY/PROCEDURES  DX-CHEST-PORTABLE (1 VIEW)   Final Result      No acute cardiopulmonary abnormality identified.            COURSE & MEDICAL DECISION MAKING  Pertinent Labs & Imaging studies reviewed. (See chart for details)  Patient is coming hypotensive, she has an irregular heart rate, intermittently  tachycardic, overall the patient states that she feels like she is at her baseline.  It is difficult to ascertain from the rhythm, it appears the patient has a normal sinus rhythm with multiple PACs that are being contacting causing an irregular rhythm.  Could possibly be a heart block although I do not see any consistency with it.  I attempted to look at her monitor strip without any improvement in acknowledgment of the underlying rhythm.  I believe it is likely medical multiple PACs with conduction.  We will rule out sepsis, give the patient IV fluids due to hypotension, with minimal results.    Work-up here shows urinary tract infection, questionable sepsis, give the patient IV antibiotics, fluid resuscitate the patient, the patient's blood pressure improved.  With Dr. Overton for admission the hospital.      FINAL IMPRESSION  1. Hypotension, unspecified hypotension type    2. Acute cystitis with hematuria    3. Hyponatremia    4.  Critical care time of 35 minutes     This dictation was created using voice recognition software. The accuracy of the dictation is limited to the abilities of the software. I expect there may be some errors of grammar and possibly content. The nursing notes were reviewed and certain aspects of this information were incorporated into this note.    Electronically signed by: Joel Carson, 6/28/2019 4:15 PM

## 2019-06-28 NOTE — ED NOTES
Sbp=90's with ivf infusing, returned to 70's upon completion . Will notify erp of same. Min cath for foul smelling urine to lab.

## 2019-06-28 NOTE — ED TRIAGE NOTES
Pt to er via [facility] from assisited leaving with c/o generlaized weakness, and staff reports low bp. bp in flied=70's, pt denies c/o. 4L n/c continuous. Reported to be in afib with bbb. ekg in progress

## 2019-06-29 LAB
ALBUMIN SERPL BCP-MCNC: 2.7 G/DL (ref 3.2–4.9)
BUN SERPL-MCNC: 8 MG/DL (ref 8–22)
CALCIUM SERPL-MCNC: 8.4 MG/DL (ref 8.4–10.2)
CHLORIDE SERPL-SCNC: 104 MMOL/L (ref 96–112)
CO2 SERPL-SCNC: 19 MMOL/L (ref 20–33)
CORTIS SERPL-MCNC: 2.7 UG/DL (ref 0–23)
CREAT SERPL-MCNC: 0.61 MG/DL (ref 0.5–1.4)
ERYTHROCYTE [DISTWIDTH] IN BLOOD BY AUTOMATED COUNT: 46.4 FL (ref 35.9–50)
GLUCOSE SERPL-MCNC: 82 MG/DL (ref 65–99)
HCT VFR BLD AUTO: 38.2 % (ref 37–47)
HGB BLD-MCNC: 12.8 G/DL (ref 12–16)
LACTATE BLD-SCNC: 1.9 MMOL/L (ref 0.5–2)
MAGNESIUM SERPL-MCNC: 2 MG/DL (ref 1.5–2.5)
MCH RBC QN AUTO: 31.8 PG (ref 27–33)
MCHC RBC AUTO-ENTMCNC: 33.5 G/DL (ref 33.6–35)
MCV RBC AUTO: 94.8 FL (ref 81.4–97.8)
PHOSPHATE SERPL-MCNC: 4.3 MG/DL (ref 2.5–4.5)
PLATELET # BLD AUTO: 57 K/UL (ref 164–446)
PMV BLD AUTO: 9.5 FL (ref 9–12.9)
POTASSIUM SERPL-SCNC: 3.3 MMOL/L (ref 3.6–5.5)
RBC # BLD AUTO: 4.03 M/UL (ref 4.2–5.4)
SODIUM SERPL-SCNC: 132 MMOL/L (ref 135–145)
WBC # BLD AUTO: 6.3 K/UL (ref 4.8–10.8)

## 2019-06-29 PROCEDURE — A9270 NON-COVERED ITEM OR SERVICE: HCPCS | Performed by: HOSPITALIST

## 2019-06-29 PROCEDURE — 99225 PR SUBSEQUENT OBSERVATION CARE,LEVEL II: CPT | Performed by: INTERNAL MEDICINE

## 2019-06-29 PROCEDURE — 700102 HCHG RX REV CODE 250 W/ 637 OVERRIDE(OP)

## 2019-06-29 PROCEDURE — 700102 HCHG RX REV CODE 250 W/ 637 OVERRIDE(OP): Performed by: INTERNAL MEDICINE

## 2019-06-29 PROCEDURE — A9270 NON-COVERED ITEM OR SERVICE: HCPCS

## 2019-06-29 PROCEDURE — 83605 ASSAY OF LACTIC ACID: CPT

## 2019-06-29 PROCEDURE — 700105 HCHG RX REV CODE 258: Performed by: HOSPITALIST

## 2019-06-29 PROCEDURE — 85027 COMPLETE CBC AUTOMATED: CPT

## 2019-06-29 PROCEDURE — G0378 HOSPITAL OBSERVATION PER HR: HCPCS

## 2019-06-29 PROCEDURE — 82533 TOTAL CORTISOL: CPT

## 2019-06-29 PROCEDURE — 83735 ASSAY OF MAGNESIUM: CPT

## 2019-06-29 PROCEDURE — 700102 HCHG RX REV CODE 250 W/ 637 OVERRIDE(OP): Performed by: HOSPITALIST

## 2019-06-29 PROCEDURE — 80069 RENAL FUNCTION PANEL: CPT

## 2019-06-29 PROCEDURE — 302255 BARRIER CREAM MOISTURE BAZA PROTECT (ZINC) 5OZ: Performed by: INTERNAL MEDICINE

## 2019-06-29 PROCEDURE — 700111 HCHG RX REV CODE 636 W/ 250 OVERRIDE (IP): Performed by: HOSPITALIST

## 2019-06-29 PROCEDURE — A9270 NON-COVERED ITEM OR SERVICE: HCPCS | Performed by: INTERNAL MEDICINE

## 2019-06-29 PROCEDURE — 96376 TX/PRO/DX INJ SAME DRUG ADON: CPT

## 2019-06-29 RX ORDER — POTASSIUM CHLORIDE 20 MEQ/1
20 TABLET, EXTENDED RELEASE ORAL DAILY
Status: DISCONTINUED | OUTPATIENT
Start: 2019-06-29 | End: 2019-06-30 | Stop reason: HOSPADM

## 2019-06-29 RX ADMIN — FAMOTIDINE 20 MG: 20 TABLET, FILM COATED ORAL at 05:36

## 2019-06-29 RX ADMIN — ATORVASTATIN CALCIUM 10 MG: 10 TABLET, FILM COATED ORAL at 20:17

## 2019-06-29 RX ADMIN — BUDESONIDE AND FORMOTEROL FUMARATE DIHYDRATE 2 PUFF: 80; 4.5 AEROSOL RESPIRATORY (INHALATION) at 18:09

## 2019-06-29 RX ADMIN — SODIUM CHLORIDE TAB 1 GM 1 G: 1 TAB at 18:08

## 2019-06-29 RX ADMIN — POTASSIUM CHLORIDE 20 MEQ: 1500 TABLET, EXTENDED RELEASE ORAL at 14:21

## 2019-06-29 RX ADMIN — PENTOSAN POLYSULFATE SODIUM 100 MG: 100 CAPSULE, GELATIN COATED ORAL at 06:22

## 2019-06-29 RX ADMIN — SENNOSIDES,DOCUSATE SODIUM 2 TABLET: 8.6; 5 TABLET, FILM COATED ORAL at 05:37

## 2019-06-29 RX ADMIN — DIVALPROEX SODIUM 1000 MG: 250 TABLET, EXTENDED RELEASE ORAL at 20:17

## 2019-06-29 RX ADMIN — BUDESONIDE AND FORMOTEROL FUMARATE DIHYDRATE 2 PUFF: 80; 4.5 AEROSOL RESPIRATORY (INHALATION) at 05:34

## 2019-06-29 RX ADMIN — ACETAMINOPHEN 500 MG: 500 TABLET, FILM COATED ORAL at 05:37

## 2019-06-29 RX ADMIN — OXYBUTYNIN CHLORIDE 10 MG: 5 TABLET, EXTENDED RELEASE ORAL at 05:37

## 2019-06-29 RX ADMIN — FAMOTIDINE 20 MG: 20 TABLET, FILM COATED ORAL at 18:08

## 2019-06-29 RX ADMIN — ASPIRIN 81 MG 81 MG: 81 TABLET ORAL at 05:36

## 2019-06-29 RX ADMIN — FLUTICASONE PROPIONATE 100 MCG: 50 SPRAY, METERED NASAL at 05:33

## 2019-06-29 RX ADMIN — Medication 1 CAPSULE: at 09:26

## 2019-06-29 RX ADMIN — MELOXICAM 7.5 MG: 7.5 TABLET ORAL at 05:36

## 2019-06-29 RX ADMIN — CEFTRIAXONE SODIUM 2 G: 2 INJECTION, POWDER, FOR SOLUTION INTRAMUSCULAR; INTRAVENOUS at 18:09

## 2019-06-29 RX ADMIN — GABAPENTIN 100 MG: 100 CAPSULE ORAL at 05:36

## 2019-06-29 RX ADMIN — SODIUM CHLORIDE: 9 INJECTION, SOLUTION INTRAVENOUS at 14:21

## 2019-06-29 RX ADMIN — OMEPRAZOLE 20 MG: 20 CAPSULE, DELAYED RELEASE ORAL at 05:38

## 2019-06-29 RX ADMIN — OXCARBAZEPINE 300 MG: 300 TABLET ORAL at 05:38

## 2019-06-29 RX ADMIN — SODIUM CHLORIDE TAB 1 GM 1 G: 1 TAB at 05:35

## 2019-06-29 RX ADMIN — ACETAMINOPHEN 500 MG: 500 TABLET, FILM COATED ORAL at 18:08

## 2019-06-29 RX ADMIN — SENNOSIDES,DOCUSATE SODIUM 2 TABLET: 8.6; 5 TABLET, FILM COATED ORAL at 18:08

## 2019-06-29 RX ADMIN — ACETAMINOPHEN 500 MG: 500 TABLET, FILM COATED ORAL at 12:18

## 2019-06-29 ASSESSMENT — COGNITIVE AND FUNCTIONAL STATUS - GENERAL
WALKING IN HOSPITAL ROOM: A LOT
DRESSING REGULAR LOWER BODY CLOTHING: A LOT
HELP NEEDED FOR BATHING: A LOT
MOVING TO AND FROM BED TO CHAIR: A LOT
CLIMB 3 TO 5 STEPS WITH RAILING: A LOT
SUGGESTED CMS G CODE MODIFIER MOBILITY: CL
PERSONAL GROOMING: A LITTLE
DAILY ACTIVITIY SCORE: 15
MOVING FROM LYING ON BACK TO SITTING ON SIDE OF FLAT BED: A LOT
DRESSING REGULAR UPPER BODY CLOTHING: A LOT
TURNING FROM BACK TO SIDE WHILE IN FLAT BAD: A LITTLE
SUGGESTED CMS G CODE MODIFIER DAILY ACTIVITY: CK
TOILETING: A LOT
MOBILITY SCORE: 13
STANDING UP FROM CHAIR USING ARMS: A LOT

## 2019-06-29 ASSESSMENT — PATIENT HEALTH QUESTIONNAIRE - PHQ9
2. FEELING DOWN, DEPRESSED, IRRITABLE, OR HOPELESS: NOT AT ALL
SUM OF ALL RESPONSES TO PHQ9 QUESTIONS 1 AND 2: 0
1. LITTLE INTEREST OR PLEASURE IN DOING THINGS: NOT AT ALL

## 2019-06-29 ASSESSMENT — ENCOUNTER SYMPTOMS
NAUSEA: 0
COUGH: 0
ABDOMINAL PAIN: 0

## 2019-06-29 NOTE — PROGRESS NOTES
Report received from KALEIGH French. Patient resting comfortably in bed. Declines any needs at this time. Call light in reach. Bed alarm on.

## 2019-06-29 NOTE — PROGRESS NOTES
Report given to Gillian FARRIS. Plan of care discussed. Patient resting comfortably in bed. Safety precautions in place.

## 2019-06-29 NOTE — H&P
Hospital Medicine History & Physical Note    Date of Service  6/28/2019    Primary Care Physician  DENZEL Hoang    Consultants  None    Code Status  DNAR/DNI per patient with daughter present    Chief Complaint  Hypotension and weakness    History of Presenting Illness  89 y.o. female w/h/o esophageal reflux, cancer, CHF, hyperlipidemia, seizure who presented 6/28/2019 with hypotension.  Patient has had an ongoing problem with hypotension although she does also have history of hypertension.  She saw her cardiologist Dr. Barksdale yesterday and her systolic blood pressure was 85.  Thus he stopped her lisinopril.  He also said he would reevaluate the Midrin but to continue it for now.  Home health went to go work with patient and could not get up blood pressure.  They also found that she had an irregular rhythm.  Thus, she was sent over to the hospital.    Review of Systems  Review of Systems   Constitutional: Negative for chills and fever.   Respiratory: Positive for cough. Negative for shortness of breath and wheezing.    Cardiovascular: Negative for chest pain.   Gastrointestinal: Negative for constipation, diarrhea, nausea and vomiting.   Genitourinary: Positive for dysuria.   Musculoskeletal: Positive for neck pain.   Neurological: Positive for headaches.     Otherwise negative per AMA/CMS criteria    Past Medical History   has a past medical history of Anesthesia; Arthritis; Asthma; Breath shortness; Cancer ([facility]); Cataract; CHEST PAIN (5/3/2011); CHF (congestive heart failure) ([facility]) (8/3/2011); Esophageal reflux (3/19/2008); Fatigue (5/3/2011); Heart burn; Hiatus hernia syndrome; HLD (hyperlipidemia) (3/29/2011); Hypertension; Incontinence; Left bundle branch block (3/29/2011); Osteoarthritis (3/19/2008); Pneumonia; Seizure ([facility]) (9/2/2008); Subjective visual disturbance, unspecified (5/3/2011); Unspecified vertiginous syndromes and labyrinthine disorders (3/19/2008); and Urinary bladder  disorder.    Surgical History   has a past surgical history that includes general lung surgery; cholecystectomy; knee replacement, total (2010); abdominal hysterectomy total; and ectropian repair (Left, 8/3/2016).    Family History  family history includes Heart Disease in her father; Stroke in her mother.    Social History   reports that she has never smoked. She has never used smokeless tobacco. She reports that she does not drink alcohol or use drugs.    Allergies  Allergies   Allergen Reactions   • Ciprofloxacin      SEIZURES.   • Prednisone Unspecified     dizziness       Medications  No current facility-administered medications on file prior to encounter.      No current outpatient prescriptions on file prior to encounter.       Physical Exam  Weight/BMI: Body mass index is 26.93 kg/m².  BP (!) 74/46   Pulse (!) 102   Temp 36.6 °C (97.8 °F) (Temporal)   Resp (!) 24   Wt 78 kg (171 lb 15.3 oz)   SpO2 97%    Vitals:    06/28/19 1527 06/28/19 1657 06/28/19 1712 06/28/19 1727   BP:       Pulse: 76 82 (!) 106 (!) 102   Resp: (!) 23 (!) 25 20 (!) 24   Temp:       TempSrc:       SpO2: 100% 100% 100% 97%   Weight:        Oxygen Therapy:  Pulse Oximetry: 97 %, O2 (LPM): 4, O2 Delivery: Nasal Cannula  Physical Exam   Constitutional: She is oriented to person, place, and time. She appears well-developed and well-nourished.   HENT:   Head: Normocephalic.   Right Ear: External ear normal.   Left Ear: External ear normal.   Nose: Nose normal.   Eyes: Pupils are equal, round, and reactive to light. Conjunctivae and EOM are normal. Right eye exhibits no discharge. Left eye exhibits no discharge. No scleral icterus.   Neck: Neck supple. No tracheal deviation present.   Cardiovascular: An irregularly irregular rhythm present. Tachycardia present.  Exam reveals no gallop and no friction rub.    Pulmonary/Chest: No respiratory distress. She has no wheezes. She has no rales.   Abdominal: Soft. She exhibits no distension.  There is no tenderness. There is no rebound and no guarding.   Musculoskeletal: She exhibits edema (1+).   Neurological: She is alert and oriented to person, place, and time.   Skin: Skin is warm and dry. No rash noted. No erythema.   Psychiatric: She has a normal mood and affect.       Laboratory:   Objective   Recent Results (from the past 24 hour(s))   EKG - STAT    Collection Time: 19  2:59 PM   Result Value Ref Range    Report       [facility] Emergency Dept.    Test Date:  2019  Pt Name:    RACHEL STALLINGS                  Department: [facility]  MRN:        [id number]                      [facility]  Gender:     Female                       Technician: [id number]  :        1930                   Requested By:TARA SANTOS  Order #:    [id number]                    Reading MD: TARA SANTOS MD    Measurements  Intervals                                Axis  Rate:       113                          P:          80  NC:         188                          QRS:        -34  QRSD:       156                          T:          148  QT:         400  QTc:        549    Interpretive Statements  SINUS TACHYCARDIA WITH IRREGULAR RATE    LEFT BUNDLE BRANCH BLOCK  MULTIPLE ATRIAL PREMATURE COMPLEXES  Compared to ECG 2018 12:59:41  Sinus rhythm no longer present    Electronically Signed On 2019 16:20:01 PDT by TARA SANTOS MD     Lactic acid (lactate)    Collection Time: 19  3:25 PM   Result Value Ref Range    Lactic Acid 1.6 0.5 - 2.0 mmol/L   CBC WITH DIFFERENTIAL    Collection Time: 19  3:25 PM   Result Value Ref Range    WBC 8.3 4.8 - 10.8 K/uL    RBC 3.93 (L) 4.20 - 5.40 M/uL    Hemoglobin 12.6 12.0 - 16.0 g/dL    Hematocrit 36.7 (L) 37.0 - 47.0 %    MCV 93.4 81.4 - 97.8 fL    MCH 32.1 27.0 - 33.0 pg    MCHC 34.3 33.6 - 35.0 g/dL    RDW 44.9 35.9 - 50.0 fL    Platelet Count 74 (L) 164 - 446 K/uL    MPV 9.4 9.0 - 12.9 fL     Neutrophils-Polys 66.70 44.00 - 72.00 %    Lymphocytes 24.00 22.00 - 41.00 %    Monocytes 8.00 0.00 - 13.40 %    Eosinophils 0.40 0.00 - 6.90 %    Basophils 0.40 0.00 - 1.80 %    Immature Granulocytes 0.50 0.00 - 0.90 %    Nucleated RBC 0.00 /100 WBC    Neutrophils (Absolute) 5.51 2.00 - 7.15 K/uL    Lymphs (Absolute) 1.98 1.00 - 4.80 K/uL    Monos (Absolute) 0.66 0.00 - 0.85 K/uL    Eos (Absolute) 0.03 0.00 - 0.51 K/uL    Baso (Absolute) 0.03 0.00 - 0.12 K/uL    Immature Granulocytes (abs) 0.04 0.00 - 0.11 K/uL    NRBC (Absolute) 0.00 K/uL   COMP METABOLIC PANEL    Collection Time: 06/28/19  3:25 PM   Result Value Ref Range    Sodium 128 (L) 135 - 145 mmol/L    Potassium 3.4 (L) 3.6 - 5.5 mmol/L    Chloride 101 96 - 112 mmol/L    Co2 19 (L) 20 - 33 mmol/L    Anion Gap 8.0 0.0 - 11.9    Glucose 95 65 - 99 mg/dL    Bun 11 8 - 22 mg/dL    Creatinine 0.74 0.50 - 1.40 mg/dL    Calcium 8.1 (L) 8.4 - 10.2 mg/dL    AST(SGOT) 13 12 - 45 U/L    ALT(SGPT) 7 2 - 50 U/L    Alkaline Phosphatase 40 30 - 99 U/L    Total Bilirubin 0.6 0.1 - 1.5 mg/dL    Albumin 2.9 (L) 3.2 - 4.9 g/dL    Total Protein 5.1 (L) 6.0 - 8.2 g/dL    Globulin 2.2 1.9 - 3.5 g/dL    A-G Ratio 1.3 g/dL   BTYPE NATRIURETIC PEPTIDE    Collection Time: 06/28/19  3:25 PM   Result Value Ref Range    B Natriuretic Peptide 226 (H) 0 - 100 pg/mL   TROPONIN    Collection Time: 06/28/19  3:25 PM   Result Value Ref Range    Troponin I <0.02 0.00 - 0.04 ng/mL   MAGNESIUM    Collection Time: 06/28/19  3:25 PM   Result Value Ref Range    Magnesium 1.0 (L) 1.5 - 2.5 mg/dL   TSH    Collection Time: 06/28/19  3:25 PM   Result Value Ref Range    TSH 1.280 0.380 - 5.330 uIU/mL   FREE THYROXINE    Collection Time: 06/28/19  3:25 PM   Result Value Ref Range    Free T-4 0.72 0.58 - 1.64 ng/dL   ESTIMATED GFR    Collection Time: 06/28/19  3:25 PM   Result Value Ref Range    GFR If African American >60 >60 mL/min/1.73 m 2    GFR If Non African American >60 >60 mL/min/1.73 m 2    URINALYSIS    Collection Time: 06/28/19  4:19 PM   Result Value Ref Range    Color Yellow     Character Cloudy (A)     Specific Gravity 1.015 <1.035    Ph 7.0 5.0 - 8.0    Glucose Negative Negative mg/dL    Ketones Negative Negative mg/dL    Protein Negative Negative mg/dL    Bilirubin Negative Negative    Nitrite Negative Negative    Leukocyte Esterase Large (A) Negative    Occult Blood Small (A) Negative    Micro Urine Req Microscopic    URINE MICROSCOPIC (W/UA)    Collection Time: 06/28/19  4:19 PM   Result Value Ref Range    -150 (A) /hpf    RBC 2-5 (A) /hpf    Bacteria Many (A) None /hpf    Epithelial Cells Few Few /hpf       (click the triangle to expand results)    Imaging:  DX-CHEST-PORTABLE (1 VIEW)   Final Result      No acute cardiopulmonary abnormality identified.          EKG:   per my independant read:  QTc: 549, HR: 113, sinus tach w/ PACs, LBBB    Assessment/Plan:  I anticipate this patient is appropriate for observation status at this time.    PAC (premature atrial contraction)- (present on admission)   Assessment & Plan    Seen on EKG  Likely the cause of the tachycardia and irregular rhythm     Hypotension- (present on admission)   Assessment & Plan    Appears to be a chronic problem for the patient  She says she does stay well-hydrated at home by drinking 3 Gatorade's per day  Continue Midodrin  A.m. cortisol pending     Hypomagnesemia- (present on admission)   Assessment & Plan    Likely contributing to tachycardia and possibly hypotension  Repleting with IV     Sepsis due to urinary tract infection (HCC)- (present on admission)   Assessment & Plan    This is sepsis (without associated acute organ dysfunction).  With hypotension and tachycardia  Sepsis protocol initiated  Blood and urine cultures pending     Essential hypertension- (present on admission)   Assessment & Plan    Now has hypotension so DC'd lisinopril by cardiology Dr. Wynn yesterday     Left bundle branch block- (present  on admission)   Assessment & Plan    Chronic         VTE prophylaxis:  SCDs

## 2019-06-29 NOTE — ASSESSMENT & PLAN NOTE
Now has hypotension so DC'd lisinopril by cardiology Dr. Wynn prior to admission  Blood pressure now coming up with fluids and antibiotics

## 2019-06-29 NOTE — PROGRESS NOTES
2 RN skin check complete with Chyna.   Devices in place oxygen tubing.  Skin assessed under devices glasses and oxygen tubing.  Confirmed pressure ulcers found on n/a.  New potential pressure ulcers noted on n/a. Wound consult placed n/a.  The following interventions in place encouraged ambulation and turning in bed.*    Pt skin is clean, dry, and intact. Bilateral upper extremities have generalized edema at the wrists. Bilateral lower extremities have 2+ pitting edema. Heels are blanching and dry. Sacrum is blanching and intact.

## 2019-06-29 NOTE — ASSESSMENT & PLAN NOTE
This is sepsis (without associated acute organ dysfunction).  With hypotension and tachycardia  Sepsis protocol ordered, now improving blood pressure  Blood and urine cultures pending

## 2019-06-29 NOTE — PROGRESS NOTES
[facility] Medicine Daily Progress Note    Date of Service  6/29/2019    Chief Complaint  89 y.o. female admitted 6/28/2019 with low blood pressure    Hospital Course    The patient was admitted with diffuse weakness and was noted to have low blood pressure despite recently stopping her lisinopril. Urinalysis revealed a urinary tract infection and she was treated with fluids and iv rocephin. Her blood pressure improved.       Interval Problem Update  The patient is sleepy today but feels overall better    Consultants/Specialty  none    Code Status  dnr    Disposition  tbd    Review of Systems  Review of Systems   Unable to perform ROS: Medical condition   Constitutional: Positive for malaise/fatigue.   Respiratory: Negative for cough.    Gastrointestinal: Negative for abdominal pain and nausea.   Genitourinary: Negative for dysuria.        Physical Exam  Temp:  [36.2 °C (97.2 °F)-36.8 °C (98.2 °F)] 36.2 °C (97.2 °F)  Pulse:  [] 68  Resp:  [18-35] 18  BP: ()/(46-71) 114/48  SpO2:  [94 %-100 %] 94 %    Physical Exam   Constitutional: She appears lethargic. No distress.   HENT:   Mouth/Throat: Oropharynx is clear and moist.   Eyes: Conjunctivae are normal. No scleral icterus.   Cardiovascular: Normal rate, regular rhythm and intact distal pulses.    Pulmonary/Chest: Breath sounds normal. No respiratory distress. She has no wheezes. She has no rales.   Abdominal: Bowel sounds are normal. She exhibits no distension. There is no tenderness.   Musculoskeletal: She exhibits no edema.   Neurological: She appears lethargic.   Skin: Skin is warm. No rash noted. She is not diaphoretic. No pallor.   Nursing note and vitals reviewed.      Fluids    Intake/Output Summary (Last 24 hours) at 06/29/19 1344  Last data filed at 06/29/19 1300   Gross per 24 hour   Intake          1272.37 ml   Output                0 ml   Net          1272.37 ml       Laboratory  Recent Labs      06/28/19   1525  06/29/19   0213   WBC  8.3  6.3    RBC  3.93*  4.03*   HEMOGLOBIN  12.6  12.8   HEMATOCRIT  36.7*  38.2   MCV  93.4  94.8   MCH  32.1  31.8   MCHC  34.3  33.5*   RDW  44.9  46.4   PLATELETCT  74*  57*   MPV  9.4  9.5     Recent Labs      06/28/19   1525  06/29/19   0213   SODIUM  128*  132*   POTASSIUM  3.4*  3.3*   CHLORIDE  101  104   CO2  19*  19*   GLUCOSE  95  82   BUN  11  8   CREATININE  0.74  0.61   CALCIUM  8.1*  8.4         Recent Labs      06/28/19   1525   BNPBTYPENAT  226*           Imaging  DX-CHEST-PORTABLE (1 VIEW)   Final Result      No acute cardiopulmonary abnormality identified.           Assessment/Plan  Thrombocytopenia (HCC)- (present on admission)   Assessment & Plan    Normally above 100  Appears to have dipped down to 74 for the first time today  Will avoid anticoagulation  Monitor on treatment for urinary infection     PAC (premature atrial contraction)- (present on admission)   Assessment & Plan    Seen on EKG       Hypotension- (present on admission)   Assessment & Plan    Appears to be a chronic problem for the patient  Exacerbated by urinary tract infection  Improving with fluids, will monitor blood pressure     Hypomagnesemia- (present on admission)   Assessment & Plan    replaced     Sepsis due to urinary tract infection (HCC)- (present on admission)   Assessment & Plan    This is sepsis (without associated acute organ dysfunction).  With hypotension and tachycardia  Sepsis protocol ordered, now improving blood pressure  Blood and urine cultures pending     Essential hypertension- (present on admission)   Assessment & Plan    Now has hypotension so DC'd lisinopril by cardiology Dr. Wynn prior to admission  Blood pressure now coming up with fluids and antibiotics     Left bundle branch block- (present on admission)   Assessment & Plan    Chronic and stable          VTE prophylaxis: none due to low platelets

## 2019-06-29 NOTE — FLOWSHEET NOTE
06/28/19 1943   Events/Summary/Plan   Events/Summary/Plan MDI f/w rinsing of mouth: hx of lobectomy of lung (see hx per Dr Forde)   Interdisciplinary Plan of Care-Goals (Indications)   Bronchodilator Indications History / Diagnosis   Interdisciplinary Plan of Care-Outcomes    Bronchodilator Outcome Patient at Stable Baseline   Education   Education Yes - Pt. / Family has been Instructed in use of Respiratory Equipment;Yes - Pt. / Family has been Instructed in use of Respiratory Medications and Adverse Reactions   RT Assessment of Delivered Medications   Evaluation of Medication Delivery Daily Yes-- Pt /Family has been Instructed in use of Respiratory Medications and Adverse Reactions   MDI/DPI Group   #MDI/DPI Given MDI/DPI x 1   Chest Exam   Respiration 18   Pulse 93   Work Of Breathing / Effort Mild   Breath Sounds   RUL Breath Sounds Clear   RML Breath Sounds Diminished   RLL Breath Sounds Diminished   BRAYDEN Breath Sounds Clear   LLL Breath Sounds Diminished   Secretions   Cough Dry   How Sputum Obtained Cough on Request   Oximetry   #Pulse Oximetry (Single Determination) Yes   Oxygen   Home O2 Use Prior To Admission? Yes   Home O2 LPM Flow 2 LPM   Home O2 Delivery Method Nasal Cannula   Home O2 Frequency of Use Continuous   Pulse Oximetry 100 %   O2 (LPM) 2   O2 Daily Delivery Respiratory  Silicone Nasal Cannula

## 2019-06-29 NOTE — CARE PLAN
Problem: Communication  Goal: The ability to communicate needs accurately and effectively will improve  Outcome: PROGRESSING AS EXPECTED  Discussed POC with pt. Pt will feel comfortable communicating questions and concerns with treatment team. Will continue to foster this relationship.    Problem: Safety  Goal: Will remain free from injury  Outcome: PROGRESSING AS EXPECTED  Reinforced fall risk precautions. Bed alarm on, non skid socks on feet, call light and personal belongings within reach.

## 2019-06-29 NOTE — ASSESSMENT & PLAN NOTE
Normally above 100  Appears to have dipped down to 74 for the first time today  Will avoid anticoagulation  Monitor on treatment for urinary infection

## 2019-06-29 NOTE — PROGRESS NOTES
Pt arrived to unit and was transferred to the room via slide board. Tele box applied, pt educated on use of call light. Safety precautions in place, will continue to monitor.

## 2019-06-29 NOTE — ASSESSMENT & PLAN NOTE
Appears to be a chronic problem for the patient  Exacerbated by urinary tract infection  Improving with fluids, will monitor blood pressure

## 2019-06-29 NOTE — CARE PLAN
Problem: Safety  Goal: Will remain free from falls  Outcome: PROGRESSING AS EXPECTED  Reinforced fall risk precautions. Bed alarm on, Non-skid socks on feet, call light in reach. Seizure precautions in place.     Problem: Infection  Goal: Will remain free from infection  Outcome: PROGRESSING AS EXPECTED  Good hand and oral hygiene promoted. Afebrile, WBCs WNL. Standard precaution in place. Perform hand washing. Administer ABX as prescribed.    Problem: Urinary Elimination:  Goal: Ability to reestablish a normal urinary elimination pattern will improve  Outcome: PROGRESSING AS EXPECTED  Incontinent at baseline.     Problem: Skin Integrity  Goal: Risk for impaired skin integrity will decrease  Outcome: PROGRESSING AS EXPECTED  Patient incontinent at baseline. Barrier cream applied. Checking patient frequently to ensure skin remains CDI. Turn patient Q2 hrs while in bed. Encourage good PO intake and OOB activity.

## 2019-06-29 NOTE — PROGRESS NOTES
I examined the patient 6/28/2019 5:33 PM  Vital Signs:BP (!) 74/46   Pulse 76   Temp 36.6 °C (97.8 °F) (Temporal)   Resp (!) 23   Wt 78 kg (171 lb 15.3 oz)   LMP  (LMP Unknown)   SpO2 100%   BMI 26.93 kg/m²   Cardiac examination significant for Regular rate and rhythm  Pulmonary examination significant for Clear lung fileds  Capillary refill is brisk  Peripheral Pulse is 1+   Skin is normal and pale

## 2019-06-29 NOTE — PROGRESS NOTES
Dr. Nunez notified of systolic BP in the 130s, received orders to hold midodrine if systolic BP is over 120.

## 2019-06-29 NOTE — PROGRESS NOTES
Telemetry Shift Summary    Rhythm SR/A fib w/ BBB  HR Range 78-83 up to 130s  Ectopy Frequent PAC, frequent to occasional PVCs, rare blocked PAC, bigeminy, and couplet   Measurements 0.20/0.14/0.40    Per Fela    Normal Values  Rhythm SR  HR Range    Measurements 0.12-0.20 / 0.06-0.10  / 0.30-0.52

## 2019-06-30 VITALS
WEIGHT: 173.06 LBS | BODY MASS INDEX: 27.16 KG/M2 | DIASTOLIC BLOOD PRESSURE: 60 MMHG | HEIGHT: 67 IN | OXYGEN SATURATION: 98 % | HEART RATE: 100 BPM | RESPIRATION RATE: 20 BRPM | SYSTOLIC BLOOD PRESSURE: 116 MMHG | TEMPERATURE: 98.3 F

## 2019-06-30 PROBLEM — I95.9 HYPOTENSION: Status: RESOLVED | Noted: 2019-06-28 | Resolved: 2019-06-30

## 2019-06-30 PROBLEM — N39.0 SEPSIS DUE TO URINARY TRACT INFECTION (HCC): Status: RESOLVED | Noted: 2019-06-28 | Resolved: 2019-06-30

## 2019-06-30 PROBLEM — A41.9 SEPSIS DUE TO URINARY TRACT INFECTION (HCC): Status: RESOLVED | Noted: 2019-06-28 | Resolved: 2019-06-30

## 2019-06-30 PROBLEM — E83.42 HYPOMAGNESEMIA: Status: RESOLVED | Noted: 2019-06-28 | Resolved: 2019-06-30

## 2019-06-30 LAB
ANION GAP SERPL CALC-SCNC: 9 MMOL/L (ref 0–11.9)
BUN SERPL-MCNC: 8 MG/DL (ref 8–22)
CALCIUM SERPL-MCNC: 8.3 MG/DL (ref 8.4–10.2)
CHLORIDE SERPL-SCNC: 110 MMOL/L (ref 96–112)
CO2 SERPL-SCNC: 20 MMOL/L (ref 20–33)
CREAT SERPL-MCNC: 0.69 MG/DL (ref 0.5–1.4)
ERYTHROCYTE [DISTWIDTH] IN BLOOD BY AUTOMATED COUNT: 47 FL (ref 35.9–50)
GLUCOSE SERPL-MCNC: 78 MG/DL (ref 65–99)
HCT VFR BLD AUTO: 37.1 % (ref 37–47)
HGB BLD-MCNC: 12.7 G/DL (ref 12–16)
MCH RBC QN AUTO: 32.7 PG (ref 27–33)
MCHC RBC AUTO-ENTMCNC: 34.2 G/DL (ref 33.6–35)
MCV RBC AUTO: 95.6 FL (ref 81.4–97.8)
PLATELET # BLD AUTO: 73 K/UL (ref 164–446)
PMV BLD AUTO: 10.4 FL (ref 9–12.9)
POTASSIUM SERPL-SCNC: 3.5 MMOL/L (ref 3.6–5.5)
RBC # BLD AUTO: 3.88 M/UL (ref 4.2–5.4)
SODIUM SERPL-SCNC: 139 MMOL/L (ref 135–145)
WBC # BLD AUTO: 5.8 K/UL (ref 4.8–10.8)

## 2019-06-30 PROCEDURE — 85027 COMPLETE CBC AUTOMATED: CPT

## 2019-06-30 PROCEDURE — G0378 HOSPITAL OBSERVATION PER HR: HCPCS

## 2019-06-30 PROCEDURE — 99217 PR OBSERVATION CARE DISCHARGE: CPT | Performed by: INTERNAL MEDICINE

## 2019-06-30 PROCEDURE — A9270 NON-COVERED ITEM OR SERVICE: HCPCS | Performed by: HOSPITALIST

## 2019-06-30 PROCEDURE — 700102 HCHG RX REV CODE 250 W/ 637 OVERRIDE(OP): Performed by: INTERNAL MEDICINE

## 2019-06-30 PROCEDURE — 700102 HCHG RX REV CODE 250 W/ 637 OVERRIDE(OP): Performed by: HOSPITALIST

## 2019-06-30 PROCEDURE — A9270 NON-COVERED ITEM OR SERVICE: HCPCS | Performed by: INTERNAL MEDICINE

## 2019-06-30 PROCEDURE — 80048 BASIC METABOLIC PNL TOTAL CA: CPT

## 2019-06-30 PROCEDURE — 700105 HCHG RX REV CODE 258: Performed by: HOSPITALIST

## 2019-06-30 RX ORDER — CEFDINIR 300 MG/1
300 CAPSULE ORAL 2 TIMES DAILY
Qty: 10 CAP | Refills: 0 | Status: SHIPPED | OUTPATIENT
Start: 2019-06-30 | End: 2019-06-30

## 2019-06-30 RX ORDER — CEFDINIR 300 MG/1
300 CAPSULE ORAL 2 TIMES DAILY
Qty: 10 CAP | Refills: 0 | Status: SHIPPED | OUTPATIENT
Start: 2019-06-30 | End: 2019-07-05

## 2019-06-30 RX ADMIN — GABAPENTIN 100 MG: 100 CAPSULE ORAL at 05:20

## 2019-06-30 RX ADMIN — Medication 1 CAPSULE: at 08:21

## 2019-06-30 RX ADMIN — FLUTICASONE PROPIONATE 100 MCG: 50 SPRAY, METERED NASAL at 05:29

## 2019-06-30 RX ADMIN — MELOXICAM 7.5 MG: 7.5 TABLET ORAL at 05:20

## 2019-06-30 RX ADMIN — POTASSIUM CHLORIDE 20 MEQ: 1500 TABLET, EXTENDED RELEASE ORAL at 05:24

## 2019-06-30 RX ADMIN — OMEPRAZOLE 20 MG: 20 CAPSULE, DELAYED RELEASE ORAL at 05:24

## 2019-06-30 RX ADMIN — ACETAMINOPHEN 500 MG: 500 TABLET, FILM COATED ORAL at 05:19

## 2019-06-30 RX ADMIN — PENTOSAN POLYSULFATE SODIUM 100 MG: 100 CAPSULE, GELATIN COATED ORAL at 05:19

## 2019-06-30 RX ADMIN — FAMOTIDINE 20 MG: 20 TABLET, FILM COATED ORAL at 05:24

## 2019-06-30 RX ADMIN — OXYBUTYNIN CHLORIDE 10 MG: 5 TABLET, EXTENDED RELEASE ORAL at 05:20

## 2019-06-30 RX ADMIN — SODIUM CHLORIDE: 9 INJECTION, SOLUTION INTRAVENOUS at 03:01

## 2019-06-30 RX ADMIN — BUDESONIDE AND FORMOTEROL FUMARATE DIHYDRATE 2 PUFF: 80; 4.5 AEROSOL RESPIRATORY (INHALATION) at 05:18

## 2019-06-30 RX ADMIN — ASPIRIN 81 MG 81 MG: 81 TABLET ORAL at 05:24

## 2019-06-30 RX ADMIN — SODIUM CHLORIDE TAB 1 GM 1 G: 1 TAB at 05:25

## 2019-06-30 RX ADMIN — SENNOSIDES,DOCUSATE SODIUM 2 TABLET: 8.6; 5 TABLET, FILM COATED ORAL at 05:21

## 2019-06-30 RX ADMIN — OXCARBAZEPINE 300 MG: 300 TABLET ORAL at 05:24

## 2019-06-30 NOTE — DISCHARGE INSTRUCTIONS
Discharge Instructions per Yessica Callaway M.D.    Follow up with your cardiologist regarding blood pressure    DIET: cardiac    ACTIVITY: as tolerated    DIAGNOSIS: urinary tract infection    Return to ER if symptoms worsen      Urinary Tract Infection, Adult  A urinary tract infection (UTI) is an infection of any part of the urinary tract, which includes the kidneys, ureters, bladder, and urethra. These organs make, store, and get rid of urine in the body. UTI can be a bladder infection (cystitis) or kidney infection (pyelonephritis).  What are the causes?  This infection may be caused by fungi, viruses, or bacteria. Bacteria are the most common cause of UTIs. This condition can also be caused by repeated incomplete emptying of the bladder during urination.  What increases the risk?  This condition is more likely to develop if:  · You ignore your need to urinate or hold urine for long periods of time.  · You do not empty your bladder completely during urination.  · You wipe back to front after urinating or having a bowel movement, if you are female.  · You are uncircumcised, if you are male.  · You are constipated.  · You have a urinary catheter that stays in place (indwelling).  · You have a weak defense (immune) system.  · You have a medical condition that affects your bowels, kidneys, or bladder.  · You have diabetes.  · You take antibiotic medicines frequently or for long periods of time, and the antibiotics no longer work well against certain types of infections (antibiotic resistance).  · You take medicines that irritate your urinary tract.  · You are exposed to chemicals that irritate your urinary tract.  · You are female.  What are the signs or symptoms?  Symptoms of this condition include:  · Fever.  · Frequent urination or passing small amounts of urine frequently.  · Needing to urinate urgently.  · Pain or burning with urination.  · Urine that smells bad or unusual.  · Cloudy urine.  · Pain in the  lower abdomen or back.  · Trouble urinating.  · Blood in the urine.  · Vomiting or being less hungry than normal.  · Diarrhea or abdominal pain.  · Vaginal discharge, if you are female.  How is this diagnosed?  This condition is diagnosed with a medical history and physical exam. You will also need to provide a urine sample to test your urine. Other tests may be done, including:  · Blood tests.  · Sexually transmitted disease (STD) testing.  If you have had more than one UTI, a cystoscopy or imaging studies may be done to determine the cause of the infections.  How is this treated?  Treatment for this condition often includes a combination of two or more of the following:  · Antibiotic medicine.  · Other medicines to treat less common causes of UTI.  · Over-the-counter medicines to treat pain.  · Drinking enough water to stay hydrated.  Follow these instructions at home:  · Take over-the-counter and prescription medicines only as told by your health care provider.  · If you were prescribed an antibiotic, take it as told by your health care provider. Do not stop taking the antibiotic even if you start to feel better.  · Avoid alcohol, caffeine, tea, and carbonated beverages. They can irritate your bladder.  · Drink enough fluid to keep your urine clear or pale yellow.  · Keep all follow-up visits as told by your health care provider. This is important.  · Make sure to:  ¨ Empty your bladder often and completely. Do not hold urine for long periods of time.  ¨ Empty your bladder before and after sex.  ¨ Wipe from front to back after a bowel movement if you are female. Use each tissue one time when you wipe.  Contact a health care provider if:  · You have back pain.  · You have a fever.  · You feel nauseous or vomit.  · Your symptoms do not get better after 3 days.  · Your symptoms go away and then return.  Get help right away if:  · You have severe back pain or lower abdominal pain.  · You are vomiting and cannot keep  down any medicines or water.  This information is not intended to replace advice given to you by your health care provider. Make sure you discuss any questions you have with your health care provider.  Document Released: 09/27/2006 Document Revised: 05/31/2017 Document Reviewed: 11/07/2016  [employer] Patient Education © 2017 [employer]      Hypotension  As your heart beats, it forces blood through your body. This force is called blood pressure. If you have hypotension, you have low blood pressure. When your blood pressure is too low, you may not get enough blood to your brain. You may feel weak, feel light-headed, have a fast heartbeat, or even pass out (faint).  Follow these instructions at home:  Eating and drinking  Drink enough fluids to keep your pee (urine) clear or pale yellow.  Eat a healthy diet, and follow instructions from your doctor about eating or drinking restrictions. A healthy diet includes:  Fresh fruits and vegetables.  Whole grains.  Low-fat (lean) meats.  Low-fat dairy products.  Eat extra salt only as told. Do not add extra salt to your diet unless your doctor tells you to.  Eat small meals often.  Avoid standing up quickly after you eat.  Medicines  Take over-the-counter and prescription medicines only as told by your doctor.  Follow instructions from your doctor about changing how much you take (the dosage) of your medicines, if this applies.  Do not stop or change your medicine on your own.  General instructions  Wear compression stockings as told by your doctor.  Get up slowly from lying down or sitting.  Avoid hot showers and a lot of heat as told by your doctor.  Return to your normal activities as told by your doctor. Ask what activities are safe for you.  Do not use any products that contain nicotine or tobacco, such as cigarettes and e-cigarettes. If you need help quitting, ask your doctor.  Keep all follow-up visits as told by your doctor. This is important.  Contact a  doctor if:  You throw up (vomit).  You have watery poop (diarrhea).  You have a fever for more than 2-3 days.  You feel more thirsty than normal.  You feel weak and tired.  Get help right away if:  You have chest pain.  You have a fast or irregular heartbeat.  You lose feeling (get numbness) in any part of your body.  You cannot move your arms or your legs.  You have trouble talking.  You get sweaty or feel light-headed.  You faint.  You have trouble breathing.  You have trouble staying awake.  You feel confused.  This information is not intended to replace advice given to you by your health care provider. Make sure you discuss any questions you have with your health care provider.  Document Released: 03/14/2011 Document Revised: 09/05/2017 Document Reviewed: 09/05/2017  [employer] Interactive Patient Education © 2017 [employer]      Discharge Instructions    Discharged to home by car with relative. Discharged via wheelchair, hospital escort: Yes.  Special equipment needed: Not Applicable    Be sure to schedule a follow-up appointment with your primary care doctor or any specialists as instructed.     Discharge Plan:   Diet Plan: Discussed  Activity Level: Discussed  Confirmed Follow up Appointment: Appointment Scheduled  Confirmed Symptoms Management: Discussed  Medication Reconciliation Updated: Yes  Influenza Vaccine Indication: Patient Refuses    I understand that a diet low in cholesterol, fat, and sodium is recommended for good health. Unless I have been given specific instructions below for another diet, I accept this instruction as my diet prescription.   Other diet: cardiac (low sodium)    Special Instructions: None    · Is patient discharged on Warfarin / Coumadin?   No     Depression / Suicide Risk    As you are discharged from this [facility], it is important to learn how to keep safe from harming yourself.    Recognize the warning signs:  · Abrupt changes in personality, positive or  negative- including increase in energy   · Giving away possessions  · Change in eating patterns- significant weight changes-  positive or negative  · Change in sleeping patterns- unable to sleep or sleeping all the time   · Unwillingness or inability to communicate  · Depression  · Unusual sadness, discouragement and loneliness  · Talk of wanting to die  · Neglect of personal appearance   · Rebelliousness- reckless behavior  · Withdrawal from people/activities they love  · Confusion- inability to concentrate     If you or a loved one observes any of these behaviors or has concerns about self-harm, here's what you can do:  · Talk about it- your feelings and reasons for harming yourself  · Remove any means that you might use to hurt yourself (examples: pills, rope, extension cords, firearm)  · Get professional help from the community (Mental Health, Substance Abuse, psychological counseling)  · Do not be alone:Call your Safe Contact- someone whom you trust who will be there for you.  · Call your local CRISIS HOTLINE 348-9822 or 963-890-2997  · Call your local Children's Mobile Crisis Response Team [place] (082) 233-5537 or www.[employer]  · Call the toll free National Suicide Prevention Hotlines   · National Suicide Prevention Lifeline 419-730-GMUA (6286)  · National Hope Line Network 800-SUICIDE (511-9153)

## 2019-06-30 NOTE — PROGRESS NOTES
Telemetry Shift Summary    Rhythm SR w/ LBBB  HR Range 60-70s  Ectopy Freq PACs, Occ PVCs, Rare Coup  Measurements .20/.16/.44        Normal Values  Rhythm SR  HR Range    Measurements 0.12-0.20 / 0.06-0.10  / 0.30-0.52

## 2019-06-30 NOTE — PROGRESS NOTES
Received report from KALEIGH Teague. Pt was lethargic in the am but has been more alert as the day progressed. Pt has used the BSC and toilet with the jordyn steady. Pt resting in bed with safety precautions in place. Care assumed.

## 2019-06-30 NOTE — DISCHARGE SUMMARY
Discharge Summary    CHIEF COMPLAINT ON ADMISSION  Chief Complaint   Patient presents with   • Weakness   • Hypotension       Reason for Admission  Weakness     Admission Date  6/28/2019    CODE STATUS  DNAR/DNI    HPI & HOSPITAL COURSE  This is a 89 y.o. Female.     The patient was admitted with diffuse weakness and was noted to have low blood pressure despite recently stopping her lisinopril. Urinalysis revealed a urinary tract infection and she was treated with fluids and iv rocephin. Her blood pressure improved.  Platelet level was noted to be low but this did improve on antibiotic therapy and was thought to be related to her urinary infection.     Therefore, she is discharged in fair and stable condition to home with close outpatient follow-up.    Discharge Date  6/30/2019    FOLLOW UP ITEMS POST DISCHARGE  Platelet level and blood pressure control    DISCHARGE DIAGNOSES  Active Problems:    Left bundle branch block (Chronic) POA: Yes    Essential hypertension POA: Yes    PAC (premature atrial contraction) POA: Yes    Thrombocytopenia (HCC) POA: Yes  Resolved Problems:    Sepsis due to urinary tract infection (HCC) POA: Yes    Hypomagnesemia POA: Yes    Hypotension POA: Yes      FOLLOW UP  Future Appointments  Date Time Provider Department Center   8/13/2019 11:15 AM Andria Forde M.D. [facility] None   12/31/2019 1:20 PM Eleazar Wynn M.D. [facility] None     No follow-up provider specified.    MEDICATIONS ON DISCHARGE     Medication List      START taking these medications      Instructions   cefdinir 300 MG Caps  Commonly known as:  OMNICEF   Take 1 Cap by mouth 2 times a day for 5 days.  Dose:  300 mg        CONTINUE taking these medications      Instructions   acetaminophen 500 MG Tabs  Commonly known as:  TYLENOL   Take 500 mg by mouth 3 times a day.  Dose:  500 mg     albuterol 108 (90 Base) MCG/ACT Aers inhalation aerosol   Inhale 2 Puffs by mouth every 6 hours as needed for Shortness of Breath.  Dose:  2  Puff     aspirin 81 MG Chew chewable tablet  Commonly known as:  ASA   Take 81 mg by mouth every day.  Dose:  81 mg     atorvastatin 10 MG Tabs  Commonly known as:  LIPITOR   Take 10 mg by mouth every evening.  Dose:  10 mg     CALMOSEPTINE EX   1 Application by Apply externally route 2 Times a Day. buttocks  Dose:  1 Application     darifenacin 15 MG SR tablet  Commonly known as:  ENABLEX   Take 15 mg by mouth every day.  Dose:  15 mg     divalproex  MG Tb24  Commonly known as:  DEPAKOTE ER   Take 1,000 mg by mouth every bedtime.  Dose:  1000 mg     famotidine 20 MG Tabs  Commonly known as:  PEPCID   Take 20 mg by mouth 2 times a day.  Dose:  20 mg     fluticasone 50 MCG/ACT nasal spray  Commonly known as:  FLONASE   Spray 2 Sprays in nose every day.  Dose:  2 Spray     fluticasone-salmeterol 100-50 MCG/DOSE Aepb  Commonly known as:  ADVAIR   Inhale 1 Puff by mouth every 12 hours.  Dose:  1 Puff     gabapentin 100 MG Caps  Commonly known as:  NEURONTIN   Take 100 mg by mouth every day.  Dose:  100 mg     LACTOBACILLUS PO   Take 1 Cap by mouth every day.  Dose:  1 Cap     lisinopril 5 MG Tabs  Commonly known as:  PRINIVIL   Take 2.5 mg by mouth every day.  Dose:  2.5 mg     meloxicam 7.5 MG Tabs  Commonly known as:  MOBIC   Take 7.5 mg by mouth every day.  Dose:  7.5 mg     methenamine hip 1 GM Tabs  Commonly known as:  HIPPREX   Take 1 g by mouth every day.  Dose:  1 g     midodrine 5 MG Tabs  Commonly known as:  PROAMATINE   Take 5 mg by mouth 2 times a day.  Dose:  5 mg     omeprazole 20 MG delayed-release capsule  Commonly known as:  PRILOSEC   Take 20 mg by mouth every day.  Dose:  20 mg     OXcarbazepine 300 MG Tabs  Commonly known as:  TRILEPTAL   Take 300 mg by mouth every day.  Dose:  300 mg     pentosan 100 MG Caps  Commonly known as:  ELMIRON   Take 100 mg by mouth every day.  Dose:  100 mg     PRESERVISION AREDS 2 PO   Take 1 Tab by mouth every day.  Dose:  1 Tab     sodium chloride 1 GM  Tabs  Commonly known as:  SALT   Take 1 g by mouth 2 Times a Day.  Dose:  1 g     Vitamin D 2000 units Caps   Take 1 Cap by mouth every day.  Dose:  1 Cap            Allergies  Allergies   Allergen Reactions   • Ciprofloxacin      SEIZURES.   • Prednisone Unspecified     dizziness       DIET  Orders Placed This Encounter   Procedures   • Diet Order Regular     Standing Status:   Standing     Number of Occurrences:   1     Order Specific Question:   Diet:     Answer:   Regular [1]     Order Specific Question:   Texture/Fiber modifications:     Answer:   Dysphagia 3(Mechanical Soft)specify fluid consistency(question 6) [3]       ACTIVITY  As tolerated.  Weight bearing as tolerated    CONSULTATIONS  none    PROCEDURES  none    LABORATORY  Lab Results   Component Value Date    SODIUM 139 06/30/2019    POTASSIUM 3.5 (L) 06/30/2019    CHLORIDE 110 06/30/2019    CO2 20 06/30/2019    GLUCOSE 78 06/30/2019    BUN 8 06/30/2019    CREATININE 0.69 06/30/2019        Lab Results   Component Value Date    WBC 6.3 06/29/2019    HEMOGLOBIN 12.8 06/29/2019    HEMATOCRIT 38.2 06/29/2019    PLATELETCT 57 (L) 06/29/2019        Total time of the discharge process exceeds 24 minutes.

## 2019-06-30 NOTE — PROGRESS NOTES
Patient's daughter states that Dr. Arshad took patient off of her Lisinopril. Contacted [facility] medical technician and she also confirmed that the patient was not taking the lisinopril prior to admit. MD notified.   Family was also asking if outpatient PT could be ordered for her as well.

## 2019-06-30 NOTE — PROGRESS NOTES
Telemetry Shift Summary    Rhythm SR with L BBB  HR Range 74-80  Ectopy occasional PVC, rare couplet and trigeminy  Measurements 0.20/0.16/0.40    Per Shakira      Normal Values  Rhythm SR  HR Range    Measurements 0.12-0.20 / 0.06-0.10  / 0.30-0.52

## 2019-06-30 NOTE — CARE PLAN
Problem: Communication  Goal: The ability to communicate needs accurately and effectively will improve  Outcome: PROGRESSING AS EXPECTED  Pt will feel comfortable communicating questions and concerns with treatment team. Will continue to foster this relationship.     Problem: Safety  Goal: Will remain free from injury  Outcome: PROGRESSING AS EXPECTED  Reinforced fall risk precautions. Bed alarm on, non skid socks on feet, call light and personal belongings within reach.

## 2019-07-01 ENCOUNTER — TELEPHONE (OUTPATIENT)
Dept: CARDIOLOGY | Facility: MEDICAL CENTER | Age: 84
End: 2019-07-01

## 2019-07-01 DIAGNOSIS — Z72.3 INACTIVITY: ICD-10-CM

## 2019-07-01 LAB
BACTERIA UR CULT: ABNORMAL
BACTERIA UR CULT: ABNORMAL
SIGNIFICANT IND 70042: ABNORMAL
SITE SITE: ABNORMAL
SOURCE SOURCE: ABNORMAL

## 2019-07-01 NOTE — PROGRESS NOTES
Post DC entry    Lab called with critical result of 1 of 2 positive blood cultures at 1338. Critical lab result read back to lab.   Dr. Callaway notified of critical lab result at 1345.  Critical lab result read back by Dr. Callaway.

## 2019-07-01 NOTE — TELEPHONE ENCOUNTER
----- Message from Carol Barker sent at 7/1/2019  9:02 AM PDT -----  Regarding: compression stockings  Contact: [phone]  MERLYN/obinna Dillon's daughter Annette Alvarez calling for script for compression stockings.   [facility]      Please call Annette

## 2019-07-01 NOTE — PROGRESS NOTES
Telemetry Shift Summary    Rhythm SA/SR/ST, BBB  HR Range 80's-100's, up to 187 PSVT  Ectopy freq PVC, rare couplets  Measurements .18/.14/.40        Normal Values  Rhythm SR  HR Range    Measurements 0.12-0.20 / 0.06-0.10  / 0.30-0.52

## 2019-07-02 LAB
BACTERIA BLD CULT: ABNORMAL
BACTERIA BLD CULT: ABNORMAL
SIGNIFICANT IND 70042: ABNORMAL
SITE SITE: ABNORMAL
SOURCE SOURCE: ABNORMAL

## 2019-07-03 LAB
BACTERIA BLD CULT: NORMAL
SIGNIFICANT IND 70042: NORMAL
SITE SITE: NORMAL
SOURCE SOURCE: NORMAL

## 2019-07-03 NOTE — PROGRESS NOTES
Discharge order written. IV removed, patient tolerated well. Tele removed and returned to monitor tech. Daughter at bedside. Belongings gathered. Pt states that all personal belongings are in possession. AVS printed, reviewed and copy signed and placed on the chart. Patient has no further questions. Written prescription for PT given to daughter at discharge. Discharged in satisfactory condition home with daughter who will drive her back to [place]. Pt off unit via wheelchair, escorted by CNA.

## 2019-07-06 ENCOUNTER — APPOINTMENT (OUTPATIENT)
Dept: RADIOLOGY | Facility: MEDICAL CENTER | Age: 84
End: 2019-07-06
Attending: EMERGENCY MEDICINE
Payer: MEDICARE

## 2019-07-06 ENCOUNTER — HOSPITAL ENCOUNTER (EMERGENCY)
Facility: MEDICAL CENTER | Age: 84
End: 2019-07-06
Attending: EMERGENCY MEDICINE
Payer: MEDICARE

## 2019-07-06 VITALS
BODY MASS INDEX: 27.15 KG/M2 | HEART RATE: 81 BPM | RESPIRATION RATE: 18 BRPM | HEIGHT: 67 IN | TEMPERATURE: 97.7 F | OXYGEN SATURATION: 97 % | DIASTOLIC BLOOD PRESSURE: 71 MMHG | SYSTOLIC BLOOD PRESSURE: 131 MMHG | WEIGHT: 173 LBS

## 2019-07-06 DIAGNOSIS — R53.1 WEAKNESS: ICD-10-CM

## 2019-07-06 DIAGNOSIS — E87.6 HYPOKALEMIA: ICD-10-CM

## 2019-07-06 DIAGNOSIS — H53.9 VISUAL CHANGES: ICD-10-CM

## 2019-07-06 LAB
ABO GROUP BLD: NORMAL
ALBUMIN SERPL BCP-MCNC: 3.7 G/DL (ref 3.2–4.9)
ALBUMIN/GLOB SERPL: 1.9 G/DL
ALP SERPL-CCNC: 31 U/L (ref 30–99)
ALT SERPL-CCNC: 5 U/L (ref 2–50)
ANION GAP SERPL CALC-SCNC: 12 MMOL/L (ref 0–11.9)
APPEARANCE UR: CLEAR
APTT PPP: 28.1 SEC (ref 24.7–36)
AST SERPL-CCNC: 11 U/L (ref 12–45)
BASOPHILS # BLD AUTO: 0.5 % (ref 0–1.8)
BASOPHILS # BLD: 0.04 K/UL (ref 0–0.12)
BILIRUB SERPL-MCNC: 0.5 MG/DL (ref 0.1–1.5)
BILIRUB UR QL STRIP.AUTO: NEGATIVE
BLD GP AB SCN SERPL QL: NORMAL
BUN SERPL-MCNC: 10 MG/DL (ref 8–22)
CALCIUM SERPL-MCNC: 9 MG/DL (ref 8.5–10.5)
CHLORIDE SERPL-SCNC: 108 MMOL/L (ref 96–112)
CO2 SERPL-SCNC: 19 MMOL/L (ref 20–33)
COLOR UR: YELLOW
CREAT SERPL-MCNC: 0.64 MG/DL (ref 0.5–1.4)
EKG IMPRESSION: NORMAL
EOSINOPHIL # BLD AUTO: 0.09 K/UL (ref 0–0.51)
EOSINOPHIL NFR BLD: 1.2 % (ref 0–6.9)
ERYTHROCYTE [DISTWIDTH] IN BLOOD BY AUTOMATED COUNT: 45.8 FL (ref 35.9–50)
GLOBULIN SER CALC-MCNC: 2 G/DL (ref 1.9–3.5)
GLUCOSE SERPL-MCNC: 117 MG/DL (ref 65–99)
GLUCOSE UR STRIP.AUTO-MCNC: NEGATIVE MG/DL
HCT VFR BLD AUTO: 39.8 % (ref 37–47)
HGB BLD-MCNC: 13.9 G/DL (ref 12–16)
IMM GRANULOCYTES # BLD AUTO: 0.06 K/UL (ref 0–0.11)
IMM GRANULOCYTES NFR BLD AUTO: 0.8 % (ref 0–0.9)
INR PPP: 1.04 (ref 0.87–1.13)
KETONES UR STRIP.AUTO-MCNC: NEGATIVE MG/DL
LEUKOCYTE ESTERASE UR QL STRIP.AUTO: NEGATIVE
LYMPHOCYTES # BLD AUTO: 2 K/UL (ref 1–4.8)
LYMPHOCYTES NFR BLD: 26.6 % (ref 22–41)
MCH RBC QN AUTO: 32.7 PG (ref 27–33)
MCHC RBC AUTO-ENTMCNC: 34.9 G/DL (ref 33.6–35)
MCV RBC AUTO: 93.6 FL (ref 81.4–97.8)
MICRO URNS: NORMAL
MONOCYTES # BLD AUTO: 0.51 K/UL (ref 0–0.85)
MONOCYTES NFR BLD AUTO: 6.8 % (ref 0–13.4)
NEUTROPHILS # BLD AUTO: 4.82 K/UL (ref 2–7.15)
NEUTROPHILS NFR BLD: 64.1 % (ref 44–72)
NITRITE UR QL STRIP.AUTO: NEGATIVE
NRBC # BLD AUTO: 0 K/UL
NRBC BLD-RTO: 0 /100 WBC
PH UR STRIP.AUTO: 8 [PH]
PLATELET # BLD AUTO: 106 K/UL (ref 164–446)
PMV BLD AUTO: 9.5 FL (ref 9–12.9)
POTASSIUM SERPL-SCNC: 2.9 MMOL/L (ref 3.6–5.5)
PROT SERPL-MCNC: 5.7 G/DL (ref 6–8.2)
PROT UR QL STRIP: NEGATIVE MG/DL
PROTHROMBIN TIME: 13.8 SEC (ref 12–14.6)
RBC # BLD AUTO: 4.25 M/UL (ref 4.2–5.4)
RBC UR QL AUTO: NEGATIVE
RH BLD: NORMAL
SODIUM SERPL-SCNC: 139 MMOL/L (ref 135–145)
SP GR UR STRIP.AUTO: 1.03
TROPONIN I SERPL-MCNC: 0.01 NG/ML (ref 0–0.04)
UROBILINOGEN UR STRIP.AUTO-MCNC: 0.2 MG/DL
WBC # BLD AUTO: 7.5 K/UL (ref 4.8–10.8)

## 2019-07-06 PROCEDURE — 70551 MRI BRAIN STEM W/O DYE: CPT

## 2019-07-06 PROCEDURE — 85610 PROTHROMBIN TIME: CPT

## 2019-07-06 PROCEDURE — 700117 HCHG RX CONTRAST REV CODE 255: Performed by: EMERGENCY MEDICINE

## 2019-07-06 PROCEDURE — 86850 RBC ANTIBODY SCREEN: CPT

## 2019-07-06 PROCEDURE — 85730 THROMBOPLASTIN TIME PARTIAL: CPT

## 2019-07-06 PROCEDURE — 700102 HCHG RX REV CODE 250 W/ 637 OVERRIDE(OP): Performed by: EMERGENCY MEDICINE

## 2019-07-06 PROCEDURE — 70496 CT ANGIOGRAPHY HEAD: CPT

## 2019-07-06 PROCEDURE — 80053 COMPREHEN METABOLIC PANEL: CPT

## 2019-07-06 PROCEDURE — 81003 URINALYSIS AUTO W/O SCOPE: CPT

## 2019-07-06 PROCEDURE — A9270 NON-COVERED ITEM OR SERVICE: HCPCS | Performed by: EMERGENCY MEDICINE

## 2019-07-06 PROCEDURE — 0042T CT-CEREBRAL PERFUSION ANALYSIS: CPT

## 2019-07-06 PROCEDURE — 86900 BLOOD TYPING SEROLOGIC ABO: CPT

## 2019-07-06 PROCEDURE — 36415 COLL VENOUS BLD VENIPUNCTURE: CPT

## 2019-07-06 PROCEDURE — 86901 BLOOD TYPING SEROLOGIC RH(D): CPT

## 2019-07-06 PROCEDURE — 99285 EMERGENCY DEPT VISIT HI MDM: CPT

## 2019-07-06 PROCEDURE — 70450 CT HEAD/BRAIN W/O DYE: CPT

## 2019-07-06 PROCEDURE — 71045 X-RAY EXAM CHEST 1 VIEW: CPT

## 2019-07-06 PROCEDURE — 70498 CT ANGIOGRAPHY NECK: CPT

## 2019-07-06 PROCEDURE — 99285 EMERGENCY DEPT VISIT HI MDM: CPT | Performed by: PSYCHIATRY & NEUROLOGY

## 2019-07-06 PROCEDURE — 93005 ELECTROCARDIOGRAM TRACING: CPT | Performed by: EMERGENCY MEDICINE

## 2019-07-06 PROCEDURE — 84484 ASSAY OF TROPONIN QUANT: CPT

## 2019-07-06 PROCEDURE — 85025 COMPLETE CBC W/AUTO DIFF WBC: CPT

## 2019-07-06 RX ORDER — POTASSIUM CHLORIDE 20 MEQ/1
20 TABLET, EXTENDED RELEASE ORAL ONCE
Status: COMPLETED | OUTPATIENT
Start: 2019-07-06 | End: 2019-07-06

## 2019-07-06 RX ORDER — POTASSIUM CHLORIDE 750 MG/1
10 TABLET, FILM COATED, EXTENDED RELEASE ORAL 2 TIMES DAILY
Qty: 10 TAB | Refills: 3 | Status: SHIPPED | OUTPATIENT
Start: 2019-07-06 | End: 2019-07-10 | Stop reason: SDUPTHER

## 2019-07-06 RX ORDER — CEFDINIR 300 MG/1
300 CAPSULE ORAL 2 TIMES DAILY
COMMUNITY
Start: 2019-07-02 | End: 2019-07-10

## 2019-07-06 RX ORDER — POTASSIUM CHLORIDE 750 MG/1
10 TABLET, FILM COATED, EXTENDED RELEASE ORAL 2 TIMES DAILY
Qty: 10 TAB | Refills: 3 | Status: SHIPPED | OUTPATIENT
Start: 2019-07-06 | End: 2019-07-06

## 2019-07-06 RX ADMIN — IOHEXOL 100 ML: 350 INJECTION, SOLUTION INTRAVENOUS at 10:01

## 2019-07-06 RX ADMIN — POTASSIUM CHLORIDE 20 MEQ: 20 TABLET, EXTENDED RELEASE ORAL at 15:55

## 2019-07-06 NOTE — ED NOTES
"Family at bedside. States speech is baseline. Per spouse pt seems \"slower\" than normal which occurred at breakfast today. Family poor historians in regards to pt baseline.   "

## 2019-07-06 NOTE — ED NOTES
MD at bedside prior to d/c. Pt daughter given d/c instruction and verbalized understanding. One rx given. Taken to lobby via wheelchair, pt on home O2. Pt took all belongings from room.

## 2019-07-06 NOTE — CONSULTS
"[facility] Neurology Stroke Consult:    Referring Physician: Cricket Garcia D.O.    Reason for consultation: Stroke  TPA Decision: No TPA due to rapidly improving symptoms    HPI: Judit Ramsey is a 89 y.o. female with history of arthritis, asthma, CHF, GERD, hyperlipidemia, hypertension, seizures presenting to the hospital for possible stroke and consulted for stroke.  The patient was in her usual state of health this morning when she went to go to the bathroom to have a bowel movement.  While straining, she developed dizziness and subsequently \"lost all her vision\".  This lasted for several minutes however recovered spontaneously.  At the time of arrival to the emergency department the patient had returned back to her baseline.  She denied any other numbness or weakness.  She has no other complaints.  She was recently diagnosed with a urinary tract infection and was treated with antibiotics and fluids.    ROS:     As above. All other systems reviewed and are negative.    Past Medical History:    has a past medical history of Anesthesia; Arthritis; Asthma; Breath shortness; Cancer ([facility]); Cataract; CHEST PAIN (5/3/2011); CHF (congestive heart failure) ([facility]) (8/3/2011); Esophageal reflux (3/19/2008); Fatigue (5/3/2011); Heart burn; Hiatus hernia syndrome; HLD (hyperlipidemia) (3/29/2011); Hypertension; Incontinence; Left bundle branch block (3/29/2011); Osteoarthritis (3/19/2008); Pneumonia; Seizure ([facility]) (9/2/2008); Subjective visual disturbance, unspecified (5/3/2011); Unspecified vertiginous syndromes and labyrinthine disorders (3/19/2008); and Urinary bladder disorder.    FHx:  family history includes Heart Disease in her father; Stroke in her mother.    SHx:   reports that she has never smoked. She has never used smokeless tobacco. She reports that she does not drink alcohol or use drugs.    Allergies:  Allergies   Allergen Reactions   • Ciprofloxacin      SEIZURES.   • Prednisone Unspecified     dizziness "       Medications:  No current facility-administered medications for this encounter.     Current Outpatient Prescriptions:   •  acetaminophen (TYLENOL) 500 MG Tab, Take 500 mg by mouth 3 times a day., Disp: , Rfl:   •  fluticasone-salmeterol (ADVAIR) 100-50 MCG/DOSE AEROSOL POWDER, BREATH ACTIVATED, Inhale 1 Puff by mouth every 12 hours., Disp: , Rfl:   •  aspirin (ASA) 81 MG Chew Tab chewable tablet, Take 81 mg by mouth every day., Disp: , Rfl:   •  atorvastatin (LIPITOR) 10 MG Tab, Take 10 mg by mouth every evening., Disp: , Rfl:   •  Menthol-Zinc Oxide (CALMOSEPTINE EX), 1 Application by Apply externally route 2 Times a Day. buttocks, Disp: , Rfl:   •  darifenacin (ENABLEX) 15 MG SR tablet, Take 15 mg by mouth every day., Disp: , Rfl:   •  divalproex ER (DEPAKOTE ER) 500 MG TABLET SR 24 HR, Take 1,000 mg by mouth every bedtime., Disp: , Rfl:   •  pentosan (ELMIRON) 100 MG Cap, Take 100 mg by mouth every day., Disp: , Rfl:   •  famotidine (PEPCID) 20 MG Tab, Take 20 mg by mouth 2 times a day., Disp: , Rfl:   •  fluticasone (FLONASE) 50 MCG/ACT nasal spray, Spray 2 Sprays in nose every day., Disp: , Rfl:   •  gabapentin (NEURONTIN) 100 MG Cap, Take 100 mg by mouth every day., Disp: , Rfl:   •  LACTOBACILLUS PO, Take 1 Cap by mouth every day., Disp: , Rfl:   •  meloxicam (MOBIC) 7.5 MG Tab, Take 7.5 mg by mouth every day., Disp: , Rfl:   •  methenamine hip (HIPPREX) 1 GM Tab, Take 1 g by mouth every day., Disp: , Rfl:   •  midodrine (PROAMATINE) 5 MG Tab, Take 5 mg by mouth 2 times a day., Disp: , Rfl:   •  omeprazole (PRILOSEC) 20 MG delayed-release capsule, Take 20 mg by mouth every day., Disp: , Rfl:   •  OXcarbazepine (TRILEPTAL) 300 MG Tab, Take 300 mg by mouth every day., Disp: , Rfl:   •  Cholecalciferol (VITAMIN D) 2000 units Cap, Take 1 Cap by mouth every day., Disp: , Rfl:   •  sodium chloride (SALT) 1 GM Tab, Take 1 g by mouth 2 Times a Day., Disp: , Rfl:   •  Multiple Vitamins-Minerals (PRESERVISION  AREDS 2 PO), Take 1 Tab by mouth every day., Disp: , Rfl:   •  albuterol 108 (90 Base) MCG/ACT Aero Soln inhalation aerosol, Inhale 2 Puffs by mouth every 6 hours as needed for Shortness of Breath., Disp: , Rfl:     Vitals:   Vitals:    07/06/19 0947 07/06/19 1000 07/06/19 1001 07/06/19 1030   BP: 131/71      Pulse: (!) 102  91 98   Resp: 17  18 (!) 21   Temp:  36.3 °C (97.3 °F) 36.3 °C (97.3 °F)    TempSrc:  Temporal Temporal    SpO2:   96% 98%   Weight:       Height:           Labs:  Lab Results   Component Value Date/Time    PROTHROMBTM 13.8 07/06/2019 09:41 AM    INR 1.04 07/06/2019 09:41 AM      Lab Results   Component Value Date/Time    WBC 7.5 07/06/2019 09:41 AM    RBC 4.25 07/06/2019 09:41 AM    HEMOGLOBIN 13.9 07/06/2019 09:41 AM    HEMATOCRIT 39.8 07/06/2019 09:41 AM    MCV 93.6 07/06/2019 09:41 AM    MCH 32.7 07/06/2019 09:41 AM    MCHC 34.9 07/06/2019 09:41 AM    MPV 9.5 07/06/2019 09:41 AM    NEUTSPOLYS 64.10 07/06/2019 09:41 AM    LYMPHOCYTES 26.60 07/06/2019 09:41 AM    MONOCYTES 6.80 07/06/2019 09:41 AM    EOSINOPHILS 1.20 07/06/2019 09:41 AM    BASOPHILS 0.50 07/06/2019 09:41 AM      Lab Results   Component Value Date/Time    SODIUM 139 07/06/2019 09:41 AM    POTASSIUM 2.9 (L) 07/06/2019 09:41 AM    CHLORIDE 108 07/06/2019 09:41 AM    CO2 19 (L) 07/06/2019 09:41 AM    GLUCOSE 117 (H) 07/06/2019 09:41 AM    BUN 10 07/06/2019 09:41 AM    CREATININE 0.64 07/06/2019 09:41 AM    BUNCREATRAT 10 (L) 01/23/2019 09:17 AM      Lab Results   Component Value Date/Time    CHOLSTRLTOT 120 01/23/2019 09:17 AM    LDL 51 01/23/2019 09:17 AM    HDL 50 01/23/2019 09:17 AM    TRIGLYCERIDE 96 01/23/2019 09:17 AM       Lab Results   Component Value Date/Time    ALKPHOSPHAT 31 07/06/2019 09:41 AM    ASTSGOT 11 (L) 07/06/2019 09:41 AM    ALTSGPT 5 07/06/2019 09:41 AM    TBILIRUBIN 0.5 07/06/2019 09:41 AM        Imaging:  CT head without contrast personally reviewed without evidence of ischemia or infarction but evidence  of some white matter disease and cerebral atrophy    CTA head and neck reviewed in chart without evidence of LVO or high-grade stenosis    CT perfusion reviewed in chart    Physical Exam:     General: Well-appearing 89-year-old female in no acute distress  Cardio: Normal S1/S2. No peripheral edema.   Pulm: CTAX2. No respiratory distress.   Skin: Warm, dry, no rashes or lesions   Psychiatric: Appropriate affect. No active psychosis.  HEENT: Atraumatic head, normal sclera and conjunctiva, moist oral mucosa. No lid lag.  Abdomen: Soft, non tender. No masses or hepatosplenomegaly.    Physical Exam:    NIH Stroke Scale:    1a. Level of Consciousness (Alert, drowsy, etc): 0= Alert    1b. LOC Questions (Month, age): 0= Answers both correctly    1c. LOC Commands (Open/close eyes make fist/let go): 0= Obeys both correctly    2.   Best Gaze (Eyes open - patient follows examiner's finger on face): 0= Normal    3.   Visual Fields (introduce visual stimulus/threat to patient's field quadrants): 0= No visual loss  4.   Facial Paresis (Show teeth, raise eyebrows and squeeze eyes shut): 0= Normal     5a. Motor Arm - Left (Elevate arm to 90 degrees if patient is sitting, 45 degrees if  supine): 0= No drift    5b. Motor Arm - Right (Elevate arm to 90 degrees if patient is sitting, 45 degrees if supine): 0= No drift    6a. Motor Leg - Left (Elevate leg 30 degrees with patient supine): 0= No drift    6b. Motor Leg - Right  (Elevate leg 30 degrees with patient supine): 0= No drift    7.   Limb Ataxia (Finger-nose, heel down shin): 0= No ataxia    8.   Sensory (Pin prick to face, arm, trunk and leg - compare side to side): 0= Normal    9.  Best Language (Name item, describe a picture and read sentences): 0= No aphasia    10. Dysarthria (Evaluate speech clarity by patient repeating listed words): 0= Normal articulation    11. Extinction and Inattention (Use information from prior testing to identify neglect or  double simultaneous stimuli  testing): 0= No neglect    Total NIH Score: 0    Assessment/Plan:    Judit Ramsey is a 89 y.o. female with history of arthritis, asthma, CHF, GERD, hyperlipidemia, hypertension, seizures presenting to the hospital for possible stroke and consulted for stroke.  The patient's symptoms occurred in the context of attempting to have a bowel movement in this regard could represent a presyncopal phenomenon however given the patient's age and her risk factors this could certainly represent a stroke.  That being said, given that her NIH stroke scale is currently a 0 and her symptoms have been rapidly improving TPA will not be administered.  An MRI of the brain will be obtained and if this shows ischemia further recommendations will provided.  If not, then the episode is most consistent with a presyncopal event.    Plan:  1.  MRI of the brain with and without contrast  2.  If this shows ischemia further recommendations will be provided.  If no ischemia seen neurology will sign off.  3.  Plan discussed with consulting physician and patient's nurse      Thee Busby M.D., Diplomat of the American Board of Psychiatry and Neurology  Diplomat of [employer] Epilepsy Subspecialty   Assistant Clinical Professor, [facility] Neurology Consultant

## 2019-07-06 NOTE — ED PROVIDER NOTES
ED Provider  Scribed for Cricket Garcia D.O. by Cady Jarrett. 7/6/2019  9:35 AM    Means of arrival: Ambulance  History obtained from: Patient and EMS   History limited by: Difficulty following simple instructions    CHIEF COMPLAINT  •  Weakness  •  Vision Changes    HPI  Judit Rmasey is an 89 y.o. female who presents to the ED by ambulance for weakness and vision changes with an onset of today. The patient resides at an assisted living facility. She woke up normal per spouse but became symptomatic at approximately 7:30 AM. Her  states the patient is significantly slowed down this morning and is shaking her head occasionally which is new for her. She complains of a constant headache. Her daughter was contacted by the nursing staff for complaints of generalized weakness and a new vision changes. Vision improved en route per EMS, but she continues to experience right sided vision changes. Negative for focal weakness or numbness, syncope, head trauma or aphasia. Glucose was 103. No chronic anticoagulation.      REVIEW OF SYSTEMS  See HPI for further details. All other systems are negative.       PAST MEDICAL HISTORY  Patient has a past medical history of Anesthesia; Arthritis; Asthma; Breath shortness; Cancer ([facility]); Cataract; CHEST PAIN (5/3/2011); CHF (congestive heart failure) ([facility]) (8/3/2011); Esophageal reflux (3/19/2008); Fatigue (5/3/2011); Heart burn; Hiatus hernia syndrome; HLD (hyperlipidemia) (3/29/2011); Hypertension; Incontinence; Left bundle branch block (3/29/2011); Osteoarthritis (3/19/2008); Pneumonia; Seizure ([facility]) (9/2/2008); Subjective visual disturbance, unspecified (5/3/2011); Unspecified vertiginous syndromes and labyrinthine disorders (3/19/2008); and Urinary bladder disorder.    SOCIAL HISTORY  Social History     Social History Main Topics   • Smoking status: Never Smoker   • Smokeless tobacco: Never Used   • Alcohol use No   • Drug use: No       SURGICAL HISTORY  Patient has a  past surgical history that includes general lung surgery; cholecystectomy; knee replacement, total (2010); abdominal hysterectomy total; and ectropian repair (Left, 8/3/2016).      CURRENT MEDICATIONS  No current facility-administered medications for this encounter.     Current Outpatient Prescriptions:   •  cefdinir (OMNICEF) 300 MG Cap, Take 300 mg by mouth 2 times a day. 5 DAYS, Disp: , Rfl:   •  potassium chloride ER (KLOR-CON) 10 MEQ tablet, Take 1 Tab by mouth 2 times a day., Disp: 10 Tab, Rfl: 3  •  acetaminophen (TYLENOL) 500 MG Tab, Take 500 mg by mouth 3 times a day., Disp: , Rfl:   •  fluticasone-salmeterol (ADVAIR) 100-50 MCG/DOSE AEROSOL POWDER, BREATH ACTIVATED, Inhale 1 Puff by mouth every 12 hours., Disp: , Rfl:   •  aspirin (ASA) 81 MG Chew Tab chewable tablet, Take 81 mg by mouth every day., Disp: , Rfl:   •  Menthol-Zinc Oxide (CALMOSEPTINE EX), 1 Application by Apply externally route 2 Times a Day. buttocks, Disp: , Rfl:   •  darifenacin (ENABLEX) 15 MG SR tablet, Take 15 mg by mouth every day., Disp: , Rfl:   •  divalproex ER (DEPAKOTE ER) 500 MG TABLET SR 24 HR, Take 1,000 mg by mouth every bedtime., Disp: , Rfl:   •  pentosan (ELMIRON) 100 MG Cap, Take 100 mg by mouth every day., Disp: , Rfl:   •  famotidine (PEPCID) 20 MG Tab, Take 20 mg by mouth 2 times a day., Disp: , Rfl:   •  fluticasone (FLONASE) 50 MCG/ACT nasal spray, Spray 2 Sprays in nose every day., Disp: , Rfl:   •  gabapentin (NEURONTIN) 100 MG Cap, Take 100 mg by mouth every day., Disp: , Rfl:   •  LACTOBACILLUS PO, Take 1 Cap by mouth every day., Disp: , Rfl:   •  meloxicam (MOBIC) 7.5 MG Tab, Take 7.5 mg by mouth every day., Disp: , Rfl:   •  methenamine hip (HIPPREX) 1 GM Tab, Take 1 g by mouth every day., Disp: , Rfl:   •  midodrine (PROAMATINE) 5 MG Tab, Take 5 mg by mouth 2 times a day., Disp: , Rfl:   •  omeprazole (PRILOSEC) 20 MG delayed-release capsule, Take 20 mg by mouth every day., Disp: , Rfl:   •  OXcarbazepine  "(TRILEPTAL) 300 MG Tab, Take 300 mg by mouth every day., Disp: , Rfl:   •  Cholecalciferol (VITAMIN D) 2000 units Cap, Take 1 Cap by mouth every day., Disp: , Rfl:   •  sodium chloride (SALT) 1 GM Tab, Take 1 g by mouth 2 Times a Day., Disp: , Rfl:   •  Multiple Vitamins-Minerals (PRESERVISION AREDS 2 PO), Take 1 Tab by mouth every day., Disp: , Rfl:        ALLERGIES  Allergies   Allergen Reactions   • Ciprofloxacin      SEIZURES.   • Prednisone Unspecified     dizziness       PHYSICAL EXAM  VITAL SIGNS: /71   Pulse 98   Temp 36.3 °C (97.3 °F) (Temporal)   Resp (!) 21   Ht 1.702 m (5' 7\")   Wt 78.5 kg (173 lb)   LMP  (LMP Unknown)   SpO2 98%   BMI 27.10 kg/m²        Constitutional: Alert in no apparent distress.  HENT: No signs of trauma, mucous membranes are moist  Eyes: Conjunctiva normal, Non-icteric.   Neck: Normal range of motion, No tenderness, Supple.  Lymphatic: No lymphadenopathy noted.   Cardiovascular: Regular rate and rhythm, no murmurs.   Thorax & Lungs: Normal breath sounds, No respiratory distress, No wheezing, No chest tenderness.   Abdomen: Bowel sounds normal, Soft, No tenderness, No masses, No pulsatile masses. No peritoneal signs.  Skin: Warm, Dry, normal color.   Back: No bony tenderness, No CVA tenderness.   Extremities: No edema, No tenderness, No cyanosis  Musculoskeletal: Good range of motion in all major joints. No tenderness to palpation or major deformities noted.   Neurologic: Alert and oriented x3, Eye gaze decreased on left bilaterally, Peripheral vision decreased in right eye upper quadrant, No facial droop, Normal strength to upper and lower extremities bilaterally, Coordination of upper extremities are normal but slow to respond to instructions. Normal sensory function.  Psychiatric: Affect normal, Judgment normal, Mood normal.       MEDICAL DECISION MAKING  This is an 89 y.o. female who presents with complaints of visual changes, and complains of generalized weakness. "  She lives in assisted living.  She lives with her  who states that this happened shortly before arrival here.  On initial evaluation her only neurological deficit was peripheral vision loss in the right upper field.  Otherwise there is no signs of stroke.  The patient was evaluated by Dr. Busby the neurologist who does not believe there is a stroke, there is no indication for TPA.  And CT CTA does not show perfusion abnormality or signs of stroke.  Dr. Busby recommended MRI and at this time MRI is still pending.  Lab does show she has low potassium which can be contributing to her generalized weakness.  She is overall somnolent, no signs of infection.  She will be given p.o. potassium which she did tolerate well which she can take as an outpatient.  With MRI still pending the plan will be to discharge the patient unless MRI shows significant abnormality that would require admission.    DIAGNOSTIC STUDIES / PROCEDURES    EKG  See labs below. Interpreted by me.      LABS  Results for orders placed or performed during the hospital encounter of 07/06/19   CBC WITH DIFFERENTIAL   Result Value Ref Range    WBC 7.5 4.8 - 10.8 K/uL    RBC 4.25 4.20 - 5.40 M/uL    Hemoglobin 13.9 12.0 - 16.0 g/dL    Hematocrit 39.8 37.0 - 47.0 %    MCV 93.6 81.4 - 97.8 fL    MCH 32.7 27.0 - 33.0 pg    MCHC 34.9 33.6 - 35.0 g/dL    RDW 45.8 35.9 - 50.0 fL    Platelet Count 106 (L) 164 - 446 K/uL    MPV 9.5 9.0 - 12.9 fL    Neutrophils-Polys 64.10 44.00 - 72.00 %    Lymphocytes 26.60 22.00 - 41.00 %    Monocytes 6.80 0.00 - 13.40 %    Eosinophils 1.20 0.00 - 6.90 %    Basophils 0.50 0.00 - 1.80 %    Immature Granulocytes 0.80 0.00 - 0.90 %    Nucleated RBC 0.00 /100 WBC    Neutrophils (Absolute) 4.82 2.00 - 7.15 K/uL    Lymphs (Absolute) 2.00 1.00 - 4.80 K/uL    Monos (Absolute) 0.51 0.00 - 0.85 K/uL    Eos (Absolute) 0.09 0.00 - 0.51 K/uL    Baso (Absolute) 0.04 0.00 - 0.12 K/uL    Immature Granulocytes (abs) 0.06 0.00 - 0.11 K/uL     NRBC (Absolute) 0.00 K/uL   COMP METABOLIC PANEL   Result Value Ref Range    Sodium 139 135 - 145 mmol/L    Potassium 2.9 (L) 3.6 - 5.5 mmol/L    Chloride 108 96 - 112 mmol/L    Co2 19 (L) 20 - 33 mmol/L    Anion Gap 12.0 (H) 0.0 - 11.9    Glucose 117 (H) 65 - 99 mg/dL    Bun 10 8 - 22 mg/dL    Creatinine 0.64 0.50 - 1.40 mg/dL    Calcium 9.0 8.5 - 10.5 mg/dL    AST(SGOT) 11 (L) 12 - 45 U/L    ALT(SGPT) 5 2 - 50 U/L    Alkaline Phosphatase 31 30 - 99 U/L    Total Bilirubin 0.5 0.1 - 1.5 mg/dL    Albumin 3.7 3.2 - 4.9 g/dL    Total Protein 5.7 (L) 6.0 - 8.2 g/dL    Globulin 2.0 1.9 - 3.5 g/dL    A-G Ratio 1.9 g/dL   PROTHROMBIN TIME   Result Value Ref Range    PT 13.8 12.0 - 14.6 sec    INR 1.04 0.87 - 1.13   APTT   Result Value Ref Range    APTT 28.1 24.7 - 36.0 sec   COD (ADULT)   Result Value Ref Range    ABO Grouping Only O     Rh Grouping Only POS     Antibody Screen-Cod NEG    TROPONIN   Result Value Ref Range    Troponin I 0.01 0.00 - 0.04 ng/mL   URINALYSIS CULTURE, IF INDICATED   Result Value Ref Range    Color Yellow     Character Clear     Specific Gravity 1.030 <1.035    Ph 8.0 5.0 - 8.0    Glucose Negative Negative mg/dL    Ketones Negative Negative mg/dL    Protein Negative Negative mg/dL    Bilirubin Negative Negative    Urobilinogen, Urine 0.2 Negative    Nitrite Negative Negative    Leukocyte Esterase Negative Negative    Occult Blood Negative Negative    Micro Urine Req see below    ESTIMATED GFR   Result Value Ref Range    GFR If African American >60 >60 mL/min/1.73 m 2    GFR If Non African American >60 >60 mL/min/1.73 m 2   EKG (NOW)   Result Value Ref Range    Report       [facility] Emergency Dept.    Test Date:  2019  Pt Name:    RACHEL STALLINGS                  Department: ER  MRN:        [id number]                      Room:        09  Gender:     Female                       Technician: [id number]  :        [id number]                   Requested By:ANGEL LUIS OLIVEIRA  RICHARD  Order #:    [id number]                    Reading MD: ANGEL LUIS RAMOS D.O.    Measurements  Intervals                                Axis  Rate:       85                           P:          88  RI:         228                          QRS:        -26  QRSD:       160                          T:          142  QT:         444  QTc:        528    Interpretive Statements  SINUS RHYTHM  FIRST DEGREE AV BLOCK  LEFT BUNDLE BRANCH BLOCK  Compared to ECG 06/28/2019 14:59:51  First degree AV block now present  Sinus tachycardia no longer present  Atrial premature complex(es) no longer present    Electronically Signed On 7-6-2019 11:38:12 PDT by ANGEL LUIS RAMOS D.O.        All labs reviewed by me.      RADIOLOGY  MR-BRAIN-W/O   Final Result      1.  Moderate-advanced cerebral atrophy.   2.  Moderate supratentorial white matter disease most consistent with microvascular ischemic change.   3.  Old lacunar infarct left head of caudate nucleus.   4.  Tiny old lacunar infarct right head of caudate nucleus.   5.  Tiny linear focus of old lacunar infarction left cerebellar hemisphere.   6.  No evidence of acute infarction, hemorrhage, or mass lesion.      DX-CHEST-PORTABLE (1 VIEW)   Final Result         Patchy left basilar opacity could be atelectasis or consolidation.      Diffuse interstitial prominence could relate to mild edema.      Trace bilateral pleural effusions.      CT-CTA NECK WITH & W/O-POST PROCESSING   Final Result      1. No evidence of flow-limiting stenosis in the cervical carotid or cervical vertebral arteries.      CT-CTA HEAD WITH & W/O-POST PROCESS   Final Result         1. No hemodynamically significant narrowing of the major intracranial vessels.      CT-CEREBRAL PERFUSION ANALYSIS   Final Result      1.  Cerebral blood flow less than 30% likely representing completed infarct = 0 mL.      2.  T Max more than 6 seconds likely representing combination of completed infarct and ischemia = 0 mL.       3.  Mismatched volume likely representing ischemic brain/penumbra = None      4.  Please note that the cerebral perfusion was performed on the limited brain tissue around the basal ganglia region. Infarct/ischemia outside the CT perfusion sections can be missed in this study.      CT-HEAD W/O   Final Result         1.  No acute intracranial findings. No evidence of acute hemorrhage or mass lesion.      2. Cerebral atrophy.      3.  White matter lucencies most consistent with chronic small vessel ischemic change.                 The radiologist's interpretations of all radiological studies have been reviewed by me.          COURSE  Pertinent Labs & Imaging studies reviewed. (See chart for details)    Obtained and reviewed past medical records shows the patient was recently admitted for weakness and hypotension. Discharged on 06/30/19.    9:35 AM Patient seen and examined at bedside. Discussed plan of care. Stroke protocol initiated. Ordered for XR chest, CT head, CT cerebral perfusion analysis, CT-CTA head, CT-CTA neck and laboratory testing to evaluate her symptoms.     10:11 AM Spoke with Dr. Busby, Neurology, regarding the patient's presenting symptoms and diagnostic results. He states she is not a candidate for IV Alteplase. He requested CT imaging be completed. If negative, complete MRI. If MRI is negative, the patient may be discharged home.     10:33 AM MRI brain was ordered.          FINAL IMPRESSION  1. Weakness    2. Hypokalemia    3. Visual changes         Cady DINH (Scribe), am scribing for, and in the presence of, Cricket Garcia D.O.    Electronically signed by: Cady Jarrett (Scribe), 7/6/2019    ICricket D.O. personally performed the services described in this documentation, as scribed by Cady Jarrett in my presence, and it is both accurate and complete. C.     The note accurately reflects work and decisions made by me.  Cricket Garcia  7/6/2019  4:25 PM

## 2019-07-06 NOTE — DISCHARGE INSTRUCTIONS
Please continue all previous care, you are being placed on potassium supplement for low potassium level.  This may be contributing to your weakness.    Please encourage additional fluid intake up to 3 or 4 glasses of fluid per day.  Please follow-up with your primary care physician return if symptoms change or worsen.  Our medication list indicates that she is taking aspirin daily, please continue this plan.

## 2019-07-06 NOTE — ED NOTES
Pt brought in by [facility] from [facility]. Per EMS at approx. 0745 today pt was eating breakfast when she had sudden onset of HA, dizziness and blurred vision. EMS states during transport pt stating vision was improving. On arrival to room pt denies vision changes or dizziness. Appears to have L eyelid droop and garbled speech. Unknown if this is baseline for pt. Pt L  weaker than R.     MD Garcia notified. At bedside.

## 2019-07-06 NOTE — ED TRIAGE NOTES
Pt bib ems from [facility]. Pt was sitting w/ spouse and daughter.  Chief Complaint   Patient presents with   • Vision Change   • Weakness   • Slurred Speech     Sudden onset of blurred vision at 745am while sitting and eating with family, then started to c/o left sided HA. Pt also reports dizziness w/ standing. Left sided weakness and left sided eye droop noted on arrival. Pt states vision has gotten better but pt is slow to respond to questions.    ERP paged to BS. Stroke Protocol activated.    PTA IV,

## 2019-07-10 ENCOUNTER — OFFICE VISIT (OUTPATIENT)
Dept: CARDIOLOGY | Facility: MEDICAL CENTER | Age: 84
End: 2019-07-10
Payer: MEDICARE

## 2019-07-10 VITALS
HEART RATE: 94 BPM | HEIGHT: 67 IN | SYSTOLIC BLOOD PRESSURE: 104 MMHG | OXYGEN SATURATION: 97 % | BODY MASS INDEX: 27.1 KG/M2 | DIASTOLIC BLOOD PRESSURE: 50 MMHG

## 2019-07-10 DIAGNOSIS — I49.1 PAC (PREMATURE ATRIAL CONTRACTION): ICD-10-CM

## 2019-07-10 DIAGNOSIS — E87.6 HYPOKALEMIA: ICD-10-CM

## 2019-07-10 DIAGNOSIS — I95.0 IDIOPATHIC HYPOTENSION: ICD-10-CM

## 2019-07-10 DIAGNOSIS — R60.0 LOCALIZED EDEMA: ICD-10-CM

## 2019-07-10 DIAGNOSIS — H53.9 VISUAL DISTURBANCE: ICD-10-CM

## 2019-07-10 LAB
ALBUMIN SERPL-MCNC: 3.6 G/DL (ref 3.5–4.7)
ALBUMIN/GLOB SERPL: 1.9 {RATIO} (ref 1.2–2.2)
ALP SERPL-CCNC: 36 IU/L (ref 39–117)
ALT SERPL-CCNC: 8 IU/L (ref 0–32)
AST SERPL-CCNC: 11 IU/L (ref 0–40)
BASOPHILS # BLD AUTO: 0 X10E3/UL (ref 0–0.2)
BASOPHILS NFR BLD AUTO: 0 %
BILIRUB SERPL-MCNC: 0.4 MG/DL (ref 0–1.2)
BUN SERPL-MCNC: 10 MG/DL (ref 8–27)
BUN/CREAT SERPL: 16 (ref 12–28)
CALCIUM SERPL-MCNC: 8.9 MG/DL (ref 8.7–10.3)
CHLORIDE SERPL-SCNC: 109 MMOL/L (ref 96–106)
CO2 SERPL-SCNC: 17 MMOL/L (ref 20–29)
CREAT SERPL-MCNC: 0.62 MG/DL (ref 0.57–1)
EOSINOPHIL # BLD AUTO: 0.1 X10E3/UL (ref 0–0.4)
EOSINOPHIL NFR BLD AUTO: 1 %
ERYTHROCYTE [DISTWIDTH] IN BLOOD BY AUTOMATED COUNT: 14.3 % (ref 12.3–15.4)
FT4I SERPL CALC-MCNC: 1 (ref 1.2–4.9)
GLOBULIN SER CALC-MCNC: 1.9 G/DL (ref 1.5–4.5)
GLUCOSE SERPL-MCNC: 100 MG/DL (ref 65–99)
HCT VFR BLD AUTO: 40.4 % (ref 34–46.6)
HGB BLD-MCNC: 13.6 G/DL (ref 11.1–15.9)
IMM GRANULOCYTES # BLD AUTO: 0 X10E3/UL (ref 0–0.1)
IMM GRANULOCYTES NFR BLD AUTO: 1 %
IMMATURE CELLS  115398: ABNORMAL
LYMPHOCYTES # BLD AUTO: 2 X10E3/UL (ref 0.7–3.1)
LYMPHOCYTES NFR BLD AUTO: 31 %
MCH RBC QN AUTO: 32 PG (ref 26.6–33)
MCHC RBC AUTO-ENTMCNC: 33.7 G/DL (ref 31.5–35.7)
MCV RBC AUTO: 95 FL (ref 79–97)
MONOCYTES # BLD AUTO: 0.5 X10E3/UL (ref 0.1–0.9)
MONOCYTES NFR BLD AUTO: 7 %
MORPHOLOGY BLD-IMP: ABNORMAL
NEUTROPHILS # BLD AUTO: 4 X10E3/UL (ref 1.4–7)
NEUTROPHILS NFR BLD AUTO: 60 %
NRBC BLD AUTO-RTO: ABNORMAL %
PLATELET # BLD AUTO: 116 X10E3/UL (ref 150–450)
POTASSIUM SERPL-SCNC: 3.3 MMOL/L (ref 3.5–5.2)
PROT SERPL-MCNC: 5.5 G/DL (ref 6–8.5)
RBC # BLD AUTO: 4.25 X10E6/UL (ref 3.77–5.28)
SODIUM SERPL-SCNC: 141 MMOL/L (ref 134–144)
T3RU NFR SERPL: 20 % (ref 24–39)
T4 SERPL-MCNC: 4.9 UG/DL (ref 4.5–12)
WBC # BLD AUTO: 6.6 X10E3/UL (ref 3.4–10.8)

## 2019-07-10 PROCEDURE — 99214 OFFICE O/P EST MOD 30 MIN: CPT | Performed by: NURSE PRACTITIONER

## 2019-07-10 RX ORDER — ALBUTEROL SULFATE 90 UG/1
2 AEROSOL, METERED RESPIRATORY (INHALATION) EVERY 6 HOURS PRN
Status: ON HOLD | COMMUNITY
End: 2020-01-12

## 2019-07-10 RX ORDER — AMOXICILLIN 500 MG/1
2000 CAPSULE ORAL
Status: ON HOLD | COMMUNITY
End: 2020-01-12

## 2019-07-10 RX ORDER — POTASSIUM CHLORIDE 750 MG/1
10 TABLET, FILM COATED, EXTENDED RELEASE ORAL 2 TIMES DAILY
Qty: 60 TAB | Refills: 11 | Status: ON HOLD | OUTPATIENT
Start: 2019-07-10 | End: 2020-01-12

## 2019-07-10 ASSESSMENT — ENCOUNTER SYMPTOMS
WEAKNESS: 0
CLAUDICATION: 0
DIZZINESS: 0
MYALGIAS: 0
PND: 0
COUGH: 0
ABDOMINAL PAIN: 0
ORTHOPNEA: 0
PALPITATIONS: 0
SHORTNESS OF BREATH: 0

## 2019-07-10 NOTE — PROGRESS NOTES
"Chief Complaint   Patient presents with   • HTN (Controlled)     hospital follow up       Subjective:   Judit Ramsey is a 89 y.o. female who presents today with her daughter, Annette, for hospital follow-up.  She had 2 hospitalizations recently.  On June 28, 2019 she was taken to the ER from the assisted living where she lives.  Physical therapy that noted her blood pressure to be 85 systolic and her pulse irregular.  She was evaluated in the ER and was found to have a frequent PACs that caused the irregular heartbeat.  She is found to have a bladder infection which was treated and she was released home.      She returned to the emergency room on July 6, 2019 as she had some visual disturbance in her right eye and shaking her head which was a new symptom.  She was evaluated both CT and MRI and seen by neurology felt to not have a stroke.  Her visual disturbance resolved and she was discharged home.    Since being out of the hospital, she has maintained her usual state of health.  She mostly sits in the wheelchair.  She has chronic lower extremity edema which is improved with compression stockings per her daughter's report.  Patient does complain that her legs hurt.    Patient denies chest pain, shortness of breath, palpitations or lightheadedness.  She has chronic pain in her knees from previous knee replacements.    Past Medical History:   Diagnosis Date   • Anesthesia     Pt and spouse state, anesthesia causes her seizures, she has had several seizures at least 4 in PACU, following several different surgeries.  She has had colonoscopies and cataract surgery and did fine.   • Arthritis     bilateral knees   • Asthma    • Breath shortness     02 @ 2L con't   • Cancer ([facility])     skin cancer   • Cataract     bilateral IOL   • CHEST PAIN 5/3/2011   • CHF (congestive heart failure) ([facility]) 8/3/2011   • Esophageal reflux 3/19/2008   • Fatigue 5/3/2011   • Heart burn     \"chronic pain in my stomach\"   • Hiatus hernia " syndrome    • HLD (hyperlipidemia) 3/29/2011   • Hypertension    • Incontinence    • Left bundle branch block 3/29/2011   • Osteoarthritis 3/19/2008   • Pneumonia     2010   • Seizure ([facility]) 9/2/2008    Pt states some anesthesia causes seizures, last seizure 4 years ago   • Subjective visual disturbance, unspecified 5/3/2011   • Unspecified vertiginous syndromes and labyrinthine disorders 3/19/2008   • Urinary bladder disorder      Past Surgical History:   Procedure Laterality Date   • ECTROPIAN REPAIR Left 8/3/2016    Procedure: ECTROPIAN REPAIR - LOWER LID WITH LATERAL TARSAL STRIP;  Surgeon: Daniel Pimentel M.D.;  Location: SURGERY SURGICAL [facility];  Service:    • KNEE REPLACEMENT, TOTAL  2010    BILATERAL   • ABDOMINAL HYSTERECTOMY TOTAL     • CHOLECYSTECTOMY     • GENERAL LUNG SURGERY      LUNG SEGMENTAL RESECTION.     Family History   Problem Relation Age of Onset   • Stroke Mother    • Heart Disease Father      Social History     Social History   • Marital status:      Spouse name: N/A   • Number of children: N/A   • Years of education: N/A     Occupational History   • Not on file.     Social History Main Topics   • Smoking status: Never Smoker   • Smokeless tobacco: Never Used   • Alcohol use No   • Drug use: No   • Sexual activity: Not on file     Other Topics Concern   • Not on file     Social History Narrative   • No narrative on file     Allergies   Allergen Reactions   • Ciprofloxacin      SEIZURES.   • Prednisone Unspecified     dizziness     Outpatient Encounter Prescriptions as of 7/10/2019   Medication Sig Dispense Refill   • amoxicillin (AMOXIL) 500 MG Cap Take 2,000 mg by mouth. Take 4 tablets 1 hour prior to dental appointment     • albuterol 108 (90 Base) MCG/ACT Aero Soln inhalation aerosol Inhale 2 Puffs by mouth every 6 hours as needed for Shortness of Breath.     • Trolamine Salicylate (ASPERCREME EX) by Apply externally route.     • potassium chloride ER (KLOR-CON) 10 MEQ tablet Take  1 Tab by mouth 2 times a day. 60 Tab 11   • acetaminophen (TYLENOL) 500 MG Tab Take 500 mg by mouth 3 times a day.     • fluticasone-salmeterol (ADVAIR) 100-50 MCG/DOSE AEROSOL POWDER, BREATH ACTIVATED Inhale 1 Puff by mouth every 12 hours.     • aspirin (ASA) 81 MG Chew Tab chewable tablet Take 81 mg by mouth every day.     • Menthol-Zinc Oxide (CALMOSEPTINE EX) 1 Application by Apply externally route 2 Times a Day. buttocks     • darifenacin (ENABLEX) 15 MG SR tablet Take 15 mg by mouth every day.     • divalproex ER (DEPAKOTE ER) 500 MG TABLET SR 24 HR Take 1,000 mg by mouth every bedtime.     • pentosan (ELMIRON) 100 MG Cap Take 100 mg by mouth every day.     • famotidine (PEPCID) 20 MG Tab Take 20 mg by mouth 2 times a day.     • fluticasone (FLONASE) 50 MCG/ACT nasal spray Spray 2 Sprays in nose every day.     • gabapentin (NEURONTIN) 100 MG Cap Take 100 mg by mouth every day.     • LACTOBACILLUS PO Take 1 Cap by mouth every day.     • meloxicam (MOBIC) 7.5 MG Tab Take 7.5 mg by mouth every day.     • methenamine hip (HIPPREX) 1 GM Tab Take 1 g by mouth every day.     • midodrine (PROAMATINE) 5 MG Tab Take 5 mg by mouth 2 times a day.     • omeprazole (PRILOSEC) 20 MG delayed-release capsule Take 20 mg by mouth every day.     • OXcarbazepine (TRILEPTAL) 300 MG Tab Take 300 mg by mouth every day.     • Cholecalciferol (VITAMIN D) 2000 units Cap Take 1 Cap by mouth every day.     • sodium chloride (SALT) 1 GM Tab Take 1 g by mouth 2 Times a Day.     • Multiple Vitamins-Minerals (PRESERVISION AREDS 2 PO) Take 1 Tab by mouth every day.     • [DISCONTINUED] cefdinir (OMNICEF) 300 MG Cap Take 300 mg by mouth 2 times a day. 5 DAYS     • [DISCONTINUED] potassium chloride ER (KLOR-CON) 10 MEQ tablet Take 1 Tab by mouth 2 times a day. 10 Tab 3     No facility-administered encounter medications on file as of 7/10/2019.      Review of Systems   Constitutional: Negative for malaise/fatigue.   Respiratory: Negative for  "cough and shortness of breath.    Cardiovascular: Positive for leg swelling (chronic). Negative for chest pain, palpitations, orthopnea, claudication and PND.   Gastrointestinal: Negative for abdominal pain.   Musculoskeletal: Positive for joint pain (bilateral  knee replacements). Negative for myalgias.   Neurological: Negative for dizziness and weakness.        Objective:   /50 (BP Location: Left arm, Patient Position: Sitting)   Pulse 94   Ht 1.702 m (5' 7\")   LMP  (LMP Unknown)   SpO2 97%   BMI 27.10 kg/m²     Physical Exam   Constitutional: She is oriented to person, place, and time. She appears well-developed and well-nourished.   Alert female seated in wheelchair in no acute distress.   HENT:   Head: Normocephalic.   Eyes: EOM are normal.   Neck: No JVD present.   Cardiovascular: Normal rate, regular rhythm and normal heart sounds.  Frequent extrasystoles are present.   Pulmonary/Chest: Effort normal and breath sounds normal.   Abdominal: Soft. Bowel sounds are normal.   Musculoskeletal: She exhibits edema (Trace to 1+ edema with compression stockings in place.).   Neurological: She is alert and oriented to person, place, and time.   Skin: Skin is warm and dry.   Psychiatric: She has a normal mood and affect.     Results for RACHEL STALLINGS (MRN [id number])    Ref. Range 7/9/2019 10:39   WBC Latest Ref Range: 3.4 - 10.8 x10E3/uL 6.6   RBC Latest Ref Range: 3.77 - 5.28 x10E6/uL 4.25   Hemoglobin Latest Ref Range: 11.1 - 15.9 g/dL 13.6   Hematocrit Latest Ref Range: 34.0 - 46.6 % 40.4   MCV Latest Ref Range: 79 - 97 fL 95   MCH Latest Ref Range: 26.6 - 33.0 pg 32.0   MCHC Latest Ref Range: 31.5 - 35.7 g/dL 33.7   RDW Latest Ref Range: 12.3 - 15.4 % 14.3   Platelet Count Latest Ref Range: 150 - 450 x10E3/uL 116 (L)   Immature Cells Unknown CANCELED   Neutrophils-Polys Latest Ref Range: Not Estab. % 60   Neutrophils (Absolute) Latest Ref Range: 1.4 - 7.0 x10E3/uL 4.0   Lymphocytes Latest Ref Range: " Not Estab. % 31   Lymphs (Absolute) Latest Ref Range: 0.7 - 3.1 x10E3/uL 2.0   Monocytes Latest Ref Range: Not Estab. % 7   Monos (Absolute) Latest Ref Range: 0.1 - 0.9 x10E3/uL 0.5   Eosinophils Latest Ref Range: Not Estab. % 1   Eos (Absolute) Latest Ref Range: 0.0 - 0.4 x10E3/uL 0.1   Basophils Latest Ref Range: Not Estab. % 0   Baso (Absolute) Latest Ref Range: 0.0 - 0.2 x10E3/uL 0.0   Immature Granulocytes Latest Ref Range: Not Estab. % 1   Immature Granulocytes (abs) Latest Ref Range: 0.0 - 0.1 x10E3/uL 0.0   Nucleated RBC Unknown CANCELED   Comments-Diff Unknown CANCELED   Sodium Latest Ref Range: 134 - 144 mmol/L 141   Potassium Latest Ref Range: 3.5 - 5.2 mmol/L 3.3 (L)   Chloride Latest Ref Range: 96 - 106 mmol/L 109 (H)   Co2 Latest Ref Range: 20 - 29 mmol/L 17 (L)   Glucose Latest Ref Range: 65 - 99 mg/dL 100 (H)   Bun Latest Ref Range: 8 - 27 mg/dL 10   Creatinine Latest Ref Range: 0.57 - 1.00 mg/dL 0.62   GFR If  Latest Ref Range: >59 mL/min/1.73 92   GFR If Non  Latest Ref Range: >59 mL/min/1.73 80   Bun-Creatinine Ratio Latest Ref Range: 12 - 28  16   Calcium Latest Ref Range: 8.7 - 10.3 mg/dL 8.9   AST(SGOT) Latest Ref Range: 0 - 40 IU/L 11   ALT(SGPT) Latest Ref Range: 0 - 32 IU/L 8   Alkaline Phosphatase Latest Ref Range: 39 - 117 IU/L 36 (L)   Total Bilirubin Latest Ref Range: 0.0 - 1.2 mg/dL 0.4   Albumin Latest Ref Range: 3.5 - 4.7 g/dL 3.6   Total Protein Latest Ref Range: 6.0 - 8.5 g/dL 5.5 (L)   Globulin Latest Ref Range: 1.5 - 4.5 g/dL 1.9   A-G Ratio Latest Ref Range: 1.2 - 2.2  1.9     Assessment:     1. Visual disturbance     2. Idiopathic hypotension     3. PAC (premature atrial contraction)     4. Hypokalemia  Basic Metabolic Panel   5. Localized edema         Medical Decision Making:  Today's Assessment / Status / Plan:   Visual disturbance: Stroke was ruled out and returned to normal.    Hypotension: Slightly improved.  Patient remains on salt tablets  and midodrine.  Continue current regimen.    PACs: She had an irregular heartbeat which was found to be premature atrial contractions and not atrial fibrillation.    Hypokalemia: Her potassium remains low at 3.3 on recheck.  I will have her take potassium 10 mEq twice daily on an ongoing basis of just the 10 days that were prescribed.  We will check basic metabolic panel prior to follow-up appointment.    Edema: Definitely improved with compression stockings.  She is not a candidate for diuretics as her blood pressure is too low.  Continue with elevation and compression.    She will follow-up as scheduled Dr. Wynn on December 31, 2019.  She will follow-up sooner if problems.    Collaborating Provider: Dr. Wynn.    Please note that this dictation was created using voice recognition software. I have made every reasonable attempt to correct obvious errors, but it is possible there are errors of grammar and possibly content that I did not discover before finalizing the note.

## 2019-07-10 NOTE — LETTER
[facility]   [address] [place] [address]  ERIK Saucedo [address]  Phone: [phone]  Fax: [phone]              Judit Ramsey  6/7/1930    Encounter Date: 7/10/2019    DENZEL Juan          PROGRESS NOTE:  Chief Complaint   Patient presents with   • HTN (Controlled)     hospital follow up       Subjective:   Judit Ramsey is a 89 y.o. female who presents today with her daughter, Annette, for hospital follow-up.  She had 2 hospitalizations recently.  On June 28, 2019 she was taken to the ER from the assisted living where she lives.  Physical therapy that noted her blood pressure to be 85 systolic and her pulse irregular.  She was evaluated in the ER and was found to have a frequent PACs that caused the irregular heartbeat.  She is found to have a bladder infection which was treated and she was released home.      She returned to the emergency room on July 6, 2019 as she had some visual disturbance in her right eye and shaking her head which was a new symptom.  She was evaluated both CT and MRI and seen by neurology felt to not have a stroke.  Her visual disturbance resolved and she was discharged home.    Since being out of the hospital, she has maintained her usual state of health.  She mostly sits in the wheelchair.  She has chronic lower extremity edema which is improved with compression stockings per her daughter's report.  Patient does complain that her legs hurt.    Patient denies chest pain, shortness of breath, palpitations or lightheadedness.  She has chronic pain in her knees from previous knee replacements.    Past Medical History:   Diagnosis Date   • Anesthesia     Pt and spouse state, anesthesia causes her seizures, she has had several seizures at least 4 in PACU, following several different surgeries.  She has had colonoscopies and cataract surgery and did fine.   • Arthritis     bilateral knees   • Asthma    • Breath shortness     02 @  "2L con't   • Cancer ([facility])     skin cancer   • Cataract     bilateral IOL   • CHEST PAIN 5/3/2011   • CHF (congestive heart failure) ([facility]) 8/3/2011   • Esophageal reflux 3/19/2008   • Fatigue 5/3/2011   • Heart burn     \"chronic pain in my stomach\"   • Hiatus hernia syndrome    • HLD (hyperlipidemia) 3/29/2011   • Hypertension    • Incontinence    • Left bundle branch block 3/29/2011   • Osteoarthritis 3/19/2008   • Pneumonia     2010   • Seizure ([facility]) 9/2/2008    Pt states some anesthesia causes seizures, last seizure 4 years ago   • Subjective visual disturbance, unspecified 5/3/2011   • Unspecified vertiginous syndromes and labyrinthine disorders 3/19/2008   • Urinary bladder disorder      Past Surgical History:   Procedure Laterality Date   • ECTROPIAN REPAIR Left 8/3/2016    Procedure: ECTROPIAN REPAIR - LOWER LID WITH LATERAL TARSAL STRIP;  Surgeon: Daniel Pimentel M.D.;  Location: SURGERY SURGICAL [facility];  Service:    • KNEE REPLACEMENT, TOTAL  2010    BILATERAL   • ABDOMINAL HYSTERECTOMY TOTAL     • CHOLECYSTECTOMY     • GENERAL LUNG SURGERY      LUNG SEGMENTAL RESECTION.     Family History   Problem Relation Age of Onset   • Stroke Mother    • Heart Disease Father      Social History     Social History   • Marital status:      Spouse name: N/A   • Number of children: N/A   • Years of education: N/A     Occupational History   • Not on file.     Social History Main Topics   • Smoking status: Never Smoker   • Smokeless tobacco: Never Used   • Alcohol use No   • Drug use: No   • Sexual activity: Not on file     Other Topics Concern   • Not on file     Social History Narrative   • No narrative on file     Allergies   Allergen Reactions   • Ciprofloxacin      SEIZURES.   • Prednisone Unspecified     dizziness     Outpatient Encounter Prescriptions as of 7/10/2019   Medication Sig Dispense Refill   • amoxicillin (AMOXIL) 500 MG Cap Take 2,000 mg by mouth. Take 4 tablets 1 hour prior to dental appointment    "   • albuterol 108 (90 Base) MCG/ACT Aero Soln inhalation aerosol Inhale 2 Puffs by mouth every 6 hours as needed for Shortness of Breath.     • Trolamine Salicylate (ASPERCREME EX) by Apply externally route.     • potassium chloride ER (KLOR-CON) 10 MEQ tablet Take 1 Tab by mouth 2 times a day. 60 Tab 11   • acetaminophen (TYLENOL) 500 MG Tab Take 500 mg by mouth 3 times a day.     • fluticasone-salmeterol (ADVAIR) 100-50 MCG/DOSE AEROSOL POWDER, BREATH ACTIVATED Inhale 1 Puff by mouth every 12 hours.     • aspirin (ASA) 81 MG Chew Tab chewable tablet Take 81 mg by mouth every day.     • Menthol-Zinc Oxide (CALMOSEPTINE EX) 1 Application by Apply externally route 2 Times a Day. buttocks     • darifenacin (ENABLEX) 15 MG SR tablet Take 15 mg by mouth every day.     • divalproex ER (DEPAKOTE ER) 500 MG TABLET SR 24 HR Take 1,000 mg by mouth every bedtime.     • pentosan (ELMIRON) 100 MG Cap Take 100 mg by mouth every day.     • famotidine (PEPCID) 20 MG Tab Take 20 mg by mouth 2 times a day.     • fluticasone (FLONASE) 50 MCG/ACT nasal spray Spray 2 Sprays in nose every day.     • gabapentin (NEURONTIN) 100 MG Cap Take 100 mg by mouth every day.     • LACTOBACILLUS PO Take 1 Cap by mouth every day.     • meloxicam (MOBIC) 7.5 MG Tab Take 7.5 mg by mouth every day.     • methenamine hip (HIPPREX) 1 GM Tab Take 1 g by mouth every day.     • midodrine (PROAMATINE) 5 MG Tab Take 5 mg by mouth 2 times a day.     • omeprazole (PRILOSEC) 20 MG delayed-release capsule Take 20 mg by mouth every day.     • OXcarbazepine (TRILEPTAL) 300 MG Tab Take 300 mg by mouth every day.     • Cholecalciferol (VITAMIN D) 2000 units Cap Take 1 Cap by mouth every day.     • sodium chloride (SALT) 1 GM Tab Take 1 g by mouth 2 Times a Day.     • Multiple Vitamins-Minerals (PRESERVISION AREDS 2 PO) Take 1 Tab by mouth every day.     • [DISCONTINUED] cefdinir (OMNICEF) 300 MG Cap Take 300 mg by mouth 2 times a day. 5 DAYS     • [DISCONTINUED]  "potassium chloride ER (KLOR-CON) 10 MEQ tablet Take 1 Tab by mouth 2 times a day. 10 Tab 3     No facility-administered encounter medications on file as of 7/10/2019.      Review of Systems   Constitutional: Negative for malaise/fatigue.   Respiratory: Negative for cough and shortness of breath.    Cardiovascular: Positive for leg swelling (chronic). Negative for chest pain, palpitations, orthopnea, claudication and PND.   Gastrointestinal: Negative for abdominal pain.   Musculoskeletal: Positive for joint pain (bilateral  knee replacements). Negative for myalgias.   Neurological: Negative for dizziness and weakness.        Objective:   /50 (BP Location: Left arm, Patient Position: Sitting)   Pulse 94   Ht 1.702 m (5' 7\")   LMP  (LMP Unknown)   SpO2 97%   BMI 27.10 kg/m²      Physical Exam   Constitutional: She is oriented to person, place, and time. She appears well-developed and well-nourished.   Alert female seated in wheelchair in no acute distress.   HENT:   Head: Normocephalic.   Eyes: EOM are normal.   Neck: No JVD present.   Cardiovascular: Normal rate, regular rhythm and normal heart sounds.  Frequent extrasystoles are present.   Pulmonary/Chest: Effort normal and breath sounds normal.   Abdominal: Soft. Bowel sounds are normal.   Musculoskeletal: She exhibits edema (Trace to 1+ edema with compression stockings in place.).   Neurological: She is alert and oriented to person, place, and time.   Skin: Skin is warm and dry.   Psychiatric: She has a normal mood and affect.     Results for RACHEL STALLINGS (MRN [id number])    Ref. Range 7/9/2019 10:39   WBC Latest Ref Range: 3.4 - 10.8 x10E3/uL 6.6   RBC Latest Ref Range: 3.77 - 5.28 x10E6/uL 4.25   Hemoglobin Latest Ref Range: 11.1 - 15.9 g/dL 13.6   Hematocrit Latest Ref Range: 34.0 - 46.6 % 40.4   MCV Latest Ref Range: 79 - 97 fL 95   MCH Latest Ref Range: 26.6 - 33.0 pg 32.0   MCHC Latest Ref Range: 31.5 - 35.7 g/dL 33.7   RDW Latest Ref Range: " 12.3 - 15.4 % 14.3   Platelet Count Latest Ref Range: 150 - 450 x10E3/uL 116 (L)   Immature Cells Unknown CANCELED   Neutrophils-Polys Latest Ref Range: Not Estab. % 60   Neutrophils (Absolute) Latest Ref Range: 1.4 - 7.0 x10E3/uL 4.0   Lymphocytes Latest Ref Range: Not Estab. % 31   Lymphs (Absolute) Latest Ref Range: 0.7 - 3.1 x10E3/uL 2.0   Monocytes Latest Ref Range: Not Estab. % 7   Monos (Absolute) Latest Ref Range: 0.1 - 0.9 x10E3/uL 0.5   Eosinophils Latest Ref Range: Not Estab. % 1   Eos (Absolute) Latest Ref Range: 0.0 - 0.4 x10E3/uL 0.1   Basophils Latest Ref Range: Not Estab. % 0   Baso (Absolute) Latest Ref Range: 0.0 - 0.2 x10E3/uL 0.0   Immature Granulocytes Latest Ref Range: Not Estab. % 1   Immature Granulocytes (abs) Latest Ref Range: 0.0 - 0.1 x10E3/uL 0.0   Nucleated RBC Unknown CANCELED   Comments-Diff Unknown CANCELED   Sodium Latest Ref Range: 134 - 144 mmol/L 141   Potassium Latest Ref Range: 3.5 - 5.2 mmol/L 3.3 (L)   Chloride Latest Ref Range: 96 - 106 mmol/L 109 (H)   Co2 Latest Ref Range: 20 - 29 mmol/L 17 (L)   Glucose Latest Ref Range: 65 - 99 mg/dL 100 (H)   Bun Latest Ref Range: 8 - 27 mg/dL 10   Creatinine Latest Ref Range: 0.57 - 1.00 mg/dL 0.62   GFR If  Latest Ref Range: >59 mL/min/1.73 92   GFR If Non  Latest Ref Range: >59 mL/min/1.73 80   Bun-Creatinine Ratio Latest Ref Range: 12 - 28  16   Calcium Latest Ref Range: 8.7 - 10.3 mg/dL 8.9   AST(SGOT) Latest Ref Range: 0 - 40 IU/L 11   ALT(SGPT) Latest Ref Range: 0 - 32 IU/L 8   Alkaline Phosphatase Latest Ref Range: 39 - 117 IU/L 36 (L)   Total Bilirubin Latest Ref Range: 0.0 - 1.2 mg/dL 0.4   Albumin Latest Ref Range: 3.5 - 4.7 g/dL 3.6   Total Protein Latest Ref Range: 6.0 - 8.5 g/dL 5.5 (L)   Globulin Latest Ref Range: 1.5 - 4.5 g/dL 1.9   A-G Ratio Latest Ref Range: 1.2 - 2.2  1.9     Assessment:     1. Visual disturbance     2. Idiopathic hypotension     3. PAC (premature atrial contraction)      4. Hypokalemia  Basic Metabolic Panel   5. Localized edema         Medical Decision Making:  Today's Assessment / Status / Plan:   Visual disturbance: Stroke was ruled out and returned to normal.    Hypotension: Slightly improved.  Patient remains on salt tablets and midodrine.  Continue current regimen.    PACs: She had an irregular heartbeat which was found to be premature atrial contractions and not atrial fibrillation.    Hypokalemia: Her potassium remains low at 3.3 on recheck.  I will have her take potassium 10 mEq twice daily on an ongoing basis of just the 10 days that were prescribed.  We will check basic metabolic panel prior to follow-up appointment.    Edema: Definitely improved with compression stockings.  She is not a candidate for diuretics as her blood pressure is too low.  Continue with elevation and compression.    She will follow-up as scheduled Dr. Wynn on December 31, 2019.  She will follow-up sooner if problems.    Collaborating Provider: Dr. Wynn.    Please note that this dictation was created using voice recognition software. I have made every reasonable attempt to correct obvious errors, but it is possible there are errors of grammar and possibly content that I did not discover before finalizing the note.          No Recipients

## 2019-08-13 ENCOUNTER — APPOINTMENT (OUTPATIENT)
Dept: PULMONOLOGY | Facility: HOSPICE | Age: 84
End: 2019-08-13
Payer: MEDICARE

## 2019-09-26 ENCOUNTER — HOSPITAL ENCOUNTER (EMERGENCY)
Facility: MEDICAL CENTER | Age: 84
End: 2019-09-26
Attending: EMERGENCY MEDICINE
Payer: MEDICARE

## 2019-09-26 ENCOUNTER — APPOINTMENT (OUTPATIENT)
Dept: RADIOLOGY | Facility: MEDICAL CENTER | Age: 84
End: 2019-09-26
Attending: EMERGENCY MEDICINE
Payer: MEDICARE

## 2019-09-26 VITALS
WEIGHT: 185.19 LBS | HEART RATE: 71 BPM | HEIGHT: 67 IN | SYSTOLIC BLOOD PRESSURE: 123 MMHG | DIASTOLIC BLOOD PRESSURE: 71 MMHG | BODY MASS INDEX: 29.07 KG/M2 | RESPIRATION RATE: 18 BRPM | OXYGEN SATURATION: 94 % | TEMPERATURE: 97.1 F
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
## 2019-09-26 DIAGNOSIS — S01.01XA LACERATION OF SCALP, INITIAL ENCOUNTER: ICD-10-CM

## 2019-09-26 DIAGNOSIS — S09.90XA CLOSED HEAD INJURY, INITIAL ENCOUNTER: ICD-10-CM

## 2019-09-26 PROCEDURE — 70450 CT HEAD/BRAIN W/O DYE: CPT

## 2019-09-26 PROCEDURE — 99285 EMERGENCY DEPT VISIT HI MDM: CPT

## 2019-09-26 PROCEDURE — 304217 HCHG IRRIGATION SYSTEM

## 2019-09-26 PROCEDURE — 304999 HCHG REPAIR-SIMPLE/INTERMED LEVEL 1

## 2019-09-26 PROCEDURE — 305308 HCHG STAPLER,SKIN,DISP.

## 2019-09-26 PROCEDURE — 700101 HCHG RX REV CODE 250: Performed by: EMERGENCY MEDICINE

## 2019-09-26 PROCEDURE — 72125 CT NECK SPINE W/O DYE: CPT

## 2019-09-26 RX ADMIN — TETRACAINE HCL 3 ML: 10 INJECTION SUBARACHNOID at 21:15

## 2019-09-27 NOTE — ED TRIAGE NOTES
Patient arrives via EMS from [facility] c/o mechanical GLF. Patient states she was in the bathroom, turned, and got hung up on the sink falling and hitting the R posterior head. +Lac/Hematoma; Denies any blood thinners. Denies any neck or back pain. Denies LOC.

## 2019-09-27 NOTE — ED PROVIDER NOTES
ED Provider Note    CHIEF COMPLAINT  Chief Complaint   Patient presents with   • Fall Less than 10 Feet     Mechanical GLF        HPI    Primary care provider: DENZEL Hoang   History obtained from: Patient,  and daughter  History limited by: None     Judti Ramsey is a 89 y.o. female who presents to the ED by EMS from her SNF due to a fall shortly prior to arrival.  Patient states that she was going to the bathroom and turned to turn on the light when she lost her balance and fell hitting the back of her head on likely the bathtub sustaining laceration to her posterior scalp.  She denies loss of consciousness.  She is reporting mild to moderate posterior headache as well as slight neck pain.  She denies pain anywhere else or any other injuries.  She denies any other symptoms including difficulty breathing/new weakness or sensory change/nausea/vomiting.  She is not on any blood thinners.   reports that patient had a tetanus shot about a month ago.    REVIEW OF SYSTEMS  Please see HPI for pertinent positives/negatives.  All other systems reviewed and are negative.     PAST MEDICAL HISTORY  Past Medical History:   Diagnosis Date   • CHF (congestive heart failure) ([facility]) 8/3/2011   • Fatigue 5/3/2011   • Subjective visual disturbance, unspecified 5/3/2011   • CHEST PAIN 5/3/2011   • HLD (hyperlipidemia) 3/29/2011   • Left bundle branch block 3/29/2011   • Seizure ([facility]) 9/2/2008    Pt states some anesthesia causes seizures, last seizure 4 years ago   • Esophageal reflux 3/19/2008   • Unspecified vertiginous syndromes and labyrinthine disorders 3/19/2008   • Osteoarthritis 3/19/2008   • Anesthesia     Pt and spouse state, anesthesia causes her seizures, she has had several seizures at least 4 in PACU, following several different surgeries.  She has had colonoscopies and cataract surgery and did fine.   • Arthritis     bilateral knees   • Asthma    • Breath shortness     02 @ 2L con't   •  "Cancer (HCC)     skin cancer   • Cataract     bilateral IOL   • Heart burn     \"chronic pain in my stomach\"   • Hiatus hernia syndrome    • Hypertension    • Incontinence    • Pneumonia     2010   • Urinary bladder disorder         SURGICAL HISTORY  Past Surgical History:   Procedure Laterality Date   • ECTROPIAN REPAIR Left 8/3/2016    Procedure: ECTROPIAN REPAIR - LOWER LID WITH LATERAL TARSAL STRIP;  Surgeon: Daniel Pimentel M.D.;  Location: [facility];  Service:    • KNEE REPLACEMENT, TOTAL  2010    BILATERAL   • ABDOMINAL HYSTERECTOMY TOTAL     • CHOLECYSTECTOMY     • GENERAL LUNG SURGERY      LUNG SEGMENTAL RESECTION.        SOCIAL HISTORY  Social History     Tobacco Use   • Smoking status: Never Smoker   • Smokeless tobacco: Never Used   Substance and Sexual Activity   • Alcohol use: No   • Drug use: No   • Sexual activity: Not on file        FAMILY HISTORY  Family History   Problem Relation Age of Onset   • Stroke Mother    • Heart Disease Father         CURRENT MEDICATIONS  Home Medications    **Home medications have not yet been reviewed for this encounter**          ALLERGIES  Allergies   Allergen Reactions   • Ciprofloxacin      SEIZURES.   • Prednisone Unspecified     dizziness        PHYSICAL EXAM  VITAL SIGNS: /74   Pulse (!) 109   Temp 36.2 °C (97.1 °F) (Temporal)   Resp 16   Ht 1.702 m (5' 7\")   Wt 84 kg (185 lb 3 oz)   LMP  (LMP Unknown)   BMI 29.00 kg/m²  @BALJINDER[[id number]::@     Pulse ox interpretation: 93% I interpret this pulse ox as normal       Constitutional: Well developed, well nourished, alert in no apparent distress, nontoxic appearance   HENT: Posterior horizontal linear scalp laceration with mild swelling with tenderness to palpation without crepitus/step-off, normocephalic, bilateral external ears normal, no hemotympanum bilaterally, oropharynx moist and clear, airway patent, nose non TTP with no hematoma/bleeding/drainage, midface stable, no malocclusion, no " periorbital swelling/bruising, no mastoid swelling/bruising   Eyes: PERRL, conjunctiva without erythema, no discharge, no icterus   Neck: Soft and supple, trachea midline, no stridor, no swelling/bruising, mild diffuse posterior tenderness to palpation, no stepoffs, no LAD, good ROM without apparent discomfort  Cardiovascular: Regular rate and rhythm, no murmurs/rubs/gallops, strong distal pulses and good perfusion   Thorax & Lungs: No respiratory distress, CTAB with equal BS bilaterally, no chest tenderness  Abdomen: Soft, nontender, nondistended, no G/R, normal BS, pelvis stable   Back: Nontender to palpation  Extremities: No cyanosis, no edema, no gross deformity, good ROM, no tenderness, intact distal pulses with brisk cap refill   Skin: Warm, dry, no pallor/cyanosis, no rash noted   Lymphatic: No lymphadenopathy noted   Neuro: A/O times 3, GCS15, no focal deficits noted, sensation intact to touch, equal strength bilateral UE/LE   Psychiatric: Cooperative, normal mood and affect, normal judgement      DIAGNOSTIC STUDIES / PROCEDURES    Verbal consent was obtained for laceration repair.  The wound was locally infiltrated with LET then irrigated with NS.  Wound was explored without evidence of FB.  The laceration was closed with 2 staples using staple gun.  Pt tolerated procedure well without complications.  Instructed pt to have staples removed in 10 days.  Pt also instructed on S/S of infection.    Total repaired wound length: 1.5 cm.      Tetanus UTD        LABS  All labs reviewed by me.     Results for orders placed or performed in visit on 07/09/19   CBC WITH DIFFERENTIAL   Result Value Ref Range    WBC 6.6 3.4 - 10.8 x10E3/uL    RBC 4.25 3.77 - 5.28 x10E6/uL    Hemoglobin 13.6 11.1 - 15.9 g/dL    Hematocrit 40.4 34.0 - 46.6 %    MCV 95 79 - 97 fL    MCH 32.0 26.6 - 33.0 pg    MCHC 33.7 31.5 - 35.7 g/dL    RDW 14.3 12.3 - 15.4 %    Platelet Count 116 (L) 150 - 450 x10E3/uL    Neutrophils-Polys 60 Not Estab. %     Lymphocytes 31 Not Estab. %    Monocytes 7 Not Estab. %    Eosinophils 1 Not Estab. %    Basophils 0 Not Estab. %    Immature Cells CANCELED     Neutrophils (Absolute) 4.0 1.4 - 7.0 x10E3/uL    Lymphs (Absolute) 2.0 0.7 - 3.1 x10E3/uL    Monos (Absolute) 0.5 0.1 - 0.9 x10E3/uL    Eos (Absolute) 0.1 0.0 - 0.4 x10E3/uL    Baso (Absolute) 0.0 0.0 - 0.2 x10E3/uL    Immature Granulocytes 1 Not Estab. %    Immature Granulocytes (abs) 0.0 0.0 - 0.1 x10E3/uL    Nucleated RBC CANCELED     Comments-Diff CANCELED    Comp Metabolic Panel   Result Value Ref Range    Glucose 100 (H) 65 - 99 mg/dL    Bun 10 8 - 27 mg/dL    Creatinine 0.62 0.57 - 1.00 mg/dL    GFR If Non  80 >59 mL/min/1.73    GFR If  92 >59 mL/min/1.73    Bun-Creatinine Ratio 16 12 - 28    Sodium 141 134 - 144 mmol/L    Potassium 3.3 (L) 3.5 - 5.2 mmol/L    Chloride 109 (H) 96 - 106 mmol/L    Co2 17 (L) 20 - 29 mmol/L    Calcium 8.9 8.7 - 10.3 mg/dL    Total Protein 5.5 (L) 6.0 - 8.5 g/dL    Albumin 3.6 3.5 - 4.7 g/dL    Globulin 1.9 1.5 - 4.5 g/dL    A-G Ratio 1.9 1.2 - 2.2    Total Bilirubin 0.4 0.0 - 1.2 mg/dL    Alkaline Phosphatase 36 (L) 39 - 117 IU/L    AST(SGOT) 11 0 - 40 IU/L    ALT(SGPT) 8 0 - 32 IU/L   THYROID PANEL   Result Value Ref Range    Thyroxin T4 4.9 4.5 - 12.0 ug/dL    T-Uptake 20 (L) 24 - 39 %    Free Thyroxin Index -Fti 1.0 (L) 1.2 - 4.9        RADIOLOGY  The radiologist's interpretation of all radiological studies have been reviewed by me.     CT-HEAD W/O   Final Result      1.  No acute intracranial abnormality.   2.  Age-consistent atrophy and chronic white matter changes.               INTERPRETING LOCATION:  [address], [place], [address]      CT-CSPINE WITHOUT PLUS RECONS   Final Result      1.  No acute fracture or traumatic subluxation.   2.  Profound osteopenia and marked multilevel arthropathy as noted above.             COURSE & MEDICAL DECISION MAKING  Nursing notes, VS, PMSFHx reviewed in chart.      Review of past medical records shows the patient was last seen at [facility] ED July 6, 2019 for weakness and vision changes.      Differential diagnoses considered include but are not limited to: Contusion, concussion/post-concussion syndrome, Fx, intracranial hemorrhage, abrasion/laceration, cervical strain/sprain       History and physical exam as above.  CT head and cervical spine without evidence for acute traumatic findings.  Patient's posterior scalp laceration was closed using staples as above without any complications.  I discussed the findings with the patient and her family.  She is alert and pleasant in no acute distress and nontoxic in appearance without focal neurological findings after a mechanical fall.  Discussed with them good wound care and monitoring for worrisome signs and symptoms of infection and she was instructed to have staples removed in about 10 days.  Also discussed with them fall precautions and avoidance of further head trauma.  Return to ED precautions were given.  Patient will be discharged back to her SNF.  They verbalized understanding and agreed with plan of care with no further questions or concerns.      The patient is referred to a primary physician for blood pressure management, diabetic screening, and for all other preventative health concerns.       FINAL IMPRESSION  1. Laceration of scalp, initial encounter Acute   2. Closed head injury, initial encounter Acute   3. Accidental fall, initial encounter Acute          DISPOSITION  Patient will be discharged home in stable condition.       FOLLOW UP  JO Hoang.  [address] [facility] [address] [address] [phone]    Call in 1 day  Please have staples removed in about 10 days    [facility], Emergency Dept  [address] [facility] [id number] [phone]    If symptoms worsen         OUTPATIENT MEDICATIONS  New Prescriptions    No medications on file          Electronically  signed by: Jamey Liang, 9/26/2019 8:41 PM      Portions of this record were made with voice recognition software.  Despite my review, spelling/grammar/context errors may still remain.  Interpretation of this chart should be taken in this context.

## 2019-10-22 ENCOUNTER — TELEPHONE (OUTPATIENT)
Dept: CARDIOLOGY | Facility: MEDICAL CENTER | Age: 84
End: 2019-10-22

## 2019-10-22 NOTE — TELEPHONE ENCOUNTER
RS    Pt's daughter Claudine is talking to an  about  her parents assets & would like to discuss pt's prognosis. #[phone]

## 2019-10-22 NOTE — TELEPHONE ENCOUNTER
Pt. Lives in assisted living. Claudine is working with  to separate assets of  and wife in case one needs an increased level of care in the future. Claudine is wondering if Dr. Wynn can make determination on prognosis/expected life duration.

## 2019-10-29 ENCOUNTER — IMMUNIZATION (OUTPATIENT)
Dept: SOCIAL WORK | Facility: CLINIC | Age: 84
End: 2019-10-29
Payer: MEDICARE

## 2019-10-29 DIAGNOSIS — Z23 NEED FOR VACCINATION: ICD-10-CM

## 2019-10-29 PROCEDURE — 90662 IIV NO PRSV INCREASED AG IM: CPT | Performed by: REGISTERED NURSE

## 2019-10-29 PROCEDURE — G0008 ADMIN INFLUENZA VIRUS VAC: HCPCS | Performed by: REGISTERED NURSE

## 2019-11-13 NOTE — TELEPHONE ENCOUNTER
RS/augusta    Pt's daughter Annette Alvarez returning your call from 10/23, relative to her mother Judit Ramsey MR# [id number] & relative also to her father Merrill Ramsey MR# [id number].    Please call Annette today .

## 2019-11-27 ENCOUNTER — HOSPITAL ENCOUNTER (OUTPATIENT)
Facility: MEDICAL CENTER | Age: 84
End: 2019-11-27
Attending: PHYSICIAN ASSISTANT
Payer: MEDICARE

## 2019-11-27 LAB
APPEARANCE UR: ABNORMAL
BACTERIA #/AREA URNS HPF: ABNORMAL /HPF
BILIRUB UR QL STRIP.AUTO: NEGATIVE
COLOR UR: YELLOW
EPI CELLS #/AREA URNS HPF: NEGATIVE /HPF
GLUCOSE UR STRIP.AUTO-MCNC: NEGATIVE MG/DL
HYALINE CASTS #/AREA URNS LPF: ABNORMAL /LPF
KETONES UR STRIP.AUTO-MCNC: NEGATIVE MG/DL
LEUKOCYTE ESTERASE UR QL STRIP.AUTO: ABNORMAL
MICRO URNS: ABNORMAL
NITRITE UR QL STRIP.AUTO: NEGATIVE
PH UR STRIP.AUTO: 7 [PH] (ref 5–8)
PROT UR QL STRIP: NEGATIVE MG/DL
RBC # URNS HPF: ABNORMAL /HPF
RBC UR QL AUTO: ABNORMAL
SP GR UR STRIP.AUTO: 1.02
UROBILINOGEN UR STRIP.AUTO-MCNC: 0.2 MG/DL
WBC #/AREA URNS HPF: ABNORMAL /HPF

## 2019-11-27 PROCEDURE — 87186 SC STD MICRODIL/AGAR DIL: CPT

## 2019-11-27 PROCEDURE — 87077 CULTURE AEROBIC IDENTIFY: CPT

## 2019-11-27 PROCEDURE — 81001 URINALYSIS AUTO W/SCOPE: CPT

## 2019-11-27 PROCEDURE — 87086 URINE CULTURE/COLONY COUNT: CPT

## 2019-12-19 ENCOUNTER — HOSPITAL ENCOUNTER (OUTPATIENT)
Facility: MEDICAL CENTER | Age: 84
End: 2019-12-19
Attending: PHYSICIAN ASSISTANT
Payer: MEDICARE

## 2019-12-19 LAB
AMORPH CRY #/AREA URNS HPF: PRESENT /HPF
APPEARANCE UR: ABNORMAL
BACTERIA #/AREA URNS HPF: ABNORMAL /HPF
BILIRUB UR QL STRIP.AUTO: NEGATIVE
COLOR UR: YELLOW
EPI CELLS #/AREA URNS HPF: ABNORMAL /HPF
GLUCOSE UR STRIP.AUTO-MCNC: NEGATIVE MG/DL
KETONES UR STRIP.AUTO-MCNC: NEGATIVE MG/DL
LEUKOCYTE ESTERASE UR QL STRIP.AUTO: ABNORMAL
MICRO URNS: ABNORMAL
NITRITE UR QL STRIP.AUTO: NEGATIVE
PH UR STRIP.AUTO: >=9 [PH] (ref 5–8)
PROT UR QL STRIP: 100 MG/DL
RBC # URNS HPF: ABNORMAL /HPF
RBC UR QL AUTO: ABNORMAL
SP GR UR STRIP.AUTO: <=1.005
TRI-PHOS CRY #/AREA URNS HPF: ABNORMAL /HPF
UROBILINOGEN UR STRIP.AUTO-MCNC: 0.2 MG/DL
WBC #/AREA URNS HPF: ABNORMAL /HPF

## 2019-12-19 PROCEDURE — 81001 URINALYSIS AUTO W/SCOPE: CPT

## 2019-12-19 PROCEDURE — 87077 CULTURE AEROBIC IDENTIFY: CPT

## 2019-12-19 PROCEDURE — 87186 SC STD MICRODIL/AGAR DIL: CPT

## 2019-12-19 PROCEDURE — 87086 URINE CULTURE/COLONY COUNT: CPT

## 2019-12-26 ENCOUNTER — HOSPITAL ENCOUNTER (OUTPATIENT)
Dept: LAB | Facility: MEDICAL CENTER | Age: 84
End: 2019-12-26
Attending: NURSE PRACTITIONER
Payer: MEDICARE

## 2019-12-26 DIAGNOSIS — E87.6 HYPOKALEMIA: ICD-10-CM

## 2019-12-26 LAB
ANION GAP SERPL CALC-SCNC: 9 MMOL/L (ref 0–11.9)
BUN SERPL-MCNC: 16 MG/DL (ref 8–22)
CALCIUM SERPL-MCNC: 9.2 MG/DL (ref 8.5–10.5)
CHLORIDE SERPL-SCNC: 105 MMOL/L (ref 96–112)
CO2 SERPL-SCNC: 22 MMOL/L (ref 20–33)
CREAT SERPL-MCNC: 0.94 MG/DL (ref 0.5–1.4)
FASTING STATUS PATIENT QL REPORTED: NORMAL
GLUCOSE SERPL-MCNC: 83 MG/DL (ref 65–99)
POTASSIUM SERPL-SCNC: 4.3 MMOL/L (ref 3.6–5.5)
SODIUM SERPL-SCNC: 136 MMOL/L (ref 135–145)

## 2019-12-26 PROCEDURE — 80048 BASIC METABOLIC PNL TOTAL CA: CPT

## 2019-12-26 PROCEDURE — 36415 COLL VENOUS BLD VENIPUNCTURE: CPT

## 2019-12-31 ENCOUNTER — OFFICE VISIT (OUTPATIENT)
Dept: CARDIOLOGY | Facility: MEDICAL CENTER | Age: 84
End: 2019-12-31
Payer: MEDICARE

## 2019-12-31 VITALS
HEIGHT: 67 IN | DIASTOLIC BLOOD PRESSURE: 50 MMHG | HEART RATE: 88 BPM | BODY MASS INDEX: 25.74 KG/M2 | SYSTOLIC BLOOD PRESSURE: 96 MMHG | WEIGHT: 164 LBS | OXYGEN SATURATION: 94 %

## 2019-12-31 DIAGNOSIS — I49.1 PAC (PREMATURE ATRIAL CONTRACTION): ICD-10-CM

## 2019-12-31 DIAGNOSIS — I44.7 LEFT BUNDLE BRANCH BLOCK: Chronic | ICD-10-CM

## 2019-12-31 DIAGNOSIS — I10 ESSENTIAL HYPERTENSION: ICD-10-CM

## 2019-12-31 PROCEDURE — 99213 OFFICE O/P EST LOW 20 MIN: CPT | Performed by: INTERNAL MEDICINE

## 2019-12-31 RX ORDER — NITROFURANTOIN 25; 75 MG/1; MG/1
CAPSULE ORAL
Status: ON HOLD | COMMUNITY
Start: 2019-12-03 | End: 2020-01-12

## 2019-12-31 RX ORDER — NICOTINE POLACRILEX 4 MG/1
GUM, CHEWING ORAL
Status: ON HOLD | COMMUNITY
Start: 2019-11-28 | End: 2020-01-12

## 2019-12-31 RX ORDER — SULFAMETHOXAZOLE AND TRIMETHOPRIM 800; 160 MG/1; MG/1
1 TABLET ORAL 2 TIMES DAILY
Status: ON HOLD | COMMUNITY
End: 2020-01-12

## 2019-12-31 RX ORDER — DIVALPROEX SODIUM 500 MG/1
TABLET, DELAYED RELEASE ORAL
Status: ON HOLD | COMMUNITY
Start: 2019-11-23 | End: 2020-01-12

## 2019-12-31 RX ORDER — POTASSIUM CHLORIDE 750 MG/1
TABLET, EXTENDED RELEASE ORAL
Status: ON HOLD | COMMUNITY
Start: 2019-11-13 | End: 2020-01-12

## 2019-12-31 RX ORDER — DOCUSATE SODIUM 100 MG/1
100 CAPSULE, LIQUID FILLED ORAL DAILY
Status: ON HOLD | COMMUNITY
End: 2020-01-12

## 2019-12-31 ASSESSMENT — ENCOUNTER SYMPTOMS
WEAKNESS: 1
LOSS OF CONSCIOUSNESS: 0
BRUISES/BLEEDS EASILY: 0
WEIGHT LOSS: 0
FALLS: 0
GASTROINTESTINAL NEGATIVE: 1
DEPRESSION: 1

## 2019-12-31 NOTE — PROGRESS NOTES
"Chief Complaint   Patient presents with   • Hypotension       Subjective:   Judit Ramsey is a 89 y.o. female who presents today primarily for history of left bundle branch block and hypertension.  She has had no syncope or presyncope.  She and her  are now living in a skilled care facility ([facility]).  She is accompanied today by her  and daughter.  No fainting.  Patient is not very physically active at all.  As noted above she had a history of hypertension, but now is hypotensive and is on salt tablets plus Midrin.  The patient has a history of depression    Past Medical History:   Diagnosis Date   • Anesthesia     Pt and spouse state, anesthesia causes her seizures, she has had several seizures at least 4 in PACU, following several different surgeries.  She has had colonoscopies and cataract surgery and did fine.   • Arthritis     bilateral knees   • Asthma    • Breath shortness     02 @ 2L con't   • Cancer ([facility])     skin cancer   • Cataract     bilateral IOL   • CHEST PAIN 5/3/2011   • CHF (congestive heart failure) ([facility]) 8/3/2011   • Esophageal reflux 3/19/2008   • Fatigue 5/3/2011   • Heart burn     \"chronic pain in my stomach\"   • Hiatus hernia syndrome    • HLD (hyperlipidemia) 3/29/2011   • Hypertension    • Incontinence    • Left bundle branch block 3/29/2011   • Osteoarthritis 3/19/2008   • Pneumonia     2010   • Seizure ([facility]) 9/2/2008    Pt states some anesthesia causes seizures, last seizure 4 years ago   • Subjective visual disturbance, unspecified 5/3/2011   • Unspecified vertiginous syndromes and labyrinthine disorders 3/19/2008   • Urinary bladder disorder      Past Surgical History:   Procedure Laterality Date   • ECTROPIAN REPAIR Left 8/3/2016    Procedure: ECTROPIAN REPAIR - LOWER LID WITH LATERAL TARSAL STRIP;  Surgeon: Daniel Pimentel M.D.;  Location: [facility];  Service:    • KNEE REPLACEMENT, TOTAL  2010    BILATERAL   • ABDOMINAL HYSTERECTOMY TOTAL     • " CHOLECYSTECTOMY     • GENERAL LUNG SURGERY      LUNG SEGMENTAL RESECTION.     Family History   Problem Relation Age of Onset   • Stroke Mother    • Heart Disease Father      Social History     Socioeconomic History   • Marital status:      Spouse name: Not on file   • Number of children: Not on file   • Years of education: Not on file   • Highest education level: Not on file   Occupational History   • Not on file   Social Needs   • Financial resource strain: Not on file   • Food insecurity:     Worry: Not on file     Inability: Not on file   • Transportation needs:     Medical: Not on file     Non-medical: Not on file   Tobacco Use   • Smoking status: Never Smoker   • Smokeless tobacco: Never Used   Substance and Sexual Activity   • Alcohol use: No   • Drug use: No   • Sexual activity: Not on file   Lifestyle   • Physical activity:     Days per week: Not on file     Minutes per session: Not on file   • Stress: Not on file   Relationships   • Social connections:     Talks on phone: Not on file     Gets together: Not on file     Attends Taoist service: Not on file     Active member of club or organization: Not on file     Attends meetings of clubs or organizations: Not on file     Relationship status: Not on file   • Intimate partner violence:     Fear of current or ex partner: Not on file     Emotionally abused: Not on file     Physically abused: Not on file     Forced sexual activity: Not on file   Other Topics Concern   • Not on file   Social History Narrative   • Not on file     Allergies   Allergen Reactions   • Ciprofloxacin      SEIZURES.   • Prednisone Unspecified     dizziness     Outpatient Encounter Medications as of 12/31/2019   Medication Sig Dispense Refill   • potassium chloride SA (K-DUR) 10 MEQ Tab CR      • sulfamethoxazole-trimethoprim (BACTRIM DS) 800-160 MG tablet Take 1 Tab by mouth 2 times a day.     • docusate sodium (COLACE) 100 MG Cap Take 100 mg by mouth every day.     •  amoxicillin (AMOXIL) 500 MG Cap Take 2,000 mg by mouth. Take 4 tablets 1 hour prior to dental appointment     • albuterol 108 (90 Base) MCG/ACT Aero Soln inhalation aerosol Inhale 2 Puffs by mouth every 6 hours as needed for Shortness of Breath.     • Trolamine Salicylate (ASPERCREME EX) by Apply externally route.     • potassium chloride ER (KLOR-CON) 10 MEQ tablet Take 1 Tab by mouth 2 times a day. 60 Tab 11   • acetaminophen (TYLENOL) 500 MG Tab Take 500 mg by mouth 3 times a day.     • fluticasone-salmeterol (ADVAIR) 100-50 MCG/DOSE AEROSOL POWDER, BREATH ACTIVATED Inhale 1 Puff by mouth every 12 hours.     • aspirin (ASA) 81 MG Chew Tab chewable tablet Take 81 mg by mouth every day.     • darifenacin (ENABLEX) 15 MG SR tablet Take 15 mg by mouth every day.     • divalproex ER (DEPAKOTE ER) 500 MG TABLET SR 24 HR Take 1,000 mg by mouth every bedtime.     • pentosan (ELMIRON) 100 MG Cap Take 100 mg by mouth every day.     • famotidine (PEPCID) 20 MG Tab Take 20 mg by mouth 2 times a day.     • fluticasone (FLONASE) 50 MCG/ACT nasal spray Spray 2 Sprays in nose every day.     • gabapentin (NEURONTIN) 100 MG Cap Take 100 mg by mouth every day.     • LACTOBACILLUS PO Take 1 Cap by mouth every day.     • meloxicam (MOBIC) 7.5 MG Tab Take 7.5 mg by mouth every day.     • methenamine hip (HIPPREX) 1 GM Tab Take 1 g by mouth every day.     • midodrine (PROAMATINE) 5 MG Tab Take 5 mg by mouth 2 times a day.     • omeprazole (PRILOSEC) 20 MG delayed-release capsule Take 20 mg by mouth every day.     • OXcarbazepine (TRILEPTAL) 300 MG Tab Take 300 mg by mouth every day.     • Cholecalciferol (VITAMIN D) 2000 units Cap Take 1 Cap by mouth every day.     • sodium chloride (SALT) 1 GM Tab Take 1 g by mouth 2 Times a Day.     • Multiple Vitamins-Minerals (PRESERVISION AREDS 2 PO) Take 1 Tab by mouth every day.     • divalproex (DEPAKOTE) 500 MG Tablet Delayed Response      • nitrofurantoin monohyd macro (MACROBID) 100 MG Cap    "   • omeprazole (PRILOSEC) 20 MG Tablet Delayed Response delayed-release tablet      • Menthol-Zinc Oxide (CALMOSEPTINE EX) 1 Application by Apply externally route 2 Times a Day. buttocks       No facility-administered encounter medications on file as of 12/31/2019.      Review of Systems   Constitutional: Negative for weight loss.   HENT: Negative.    Cardiovascular: Positive for leg swelling. Negative for chest pain.   Gastrointestinal: Negative.    Genitourinary: Negative for hematuria.   Musculoskeletal: Negative for falls.   Skin: Negative.    Neurological: Positive for weakness. Negative for loss of consciousness.   Endo/Heme/Allergies: Negative.  Does not bruise/bleed easily.   Psychiatric/Behavioral: Positive for depression.        Objective:   BP (!) 96/50 (BP Location: Left arm, Patient Position: Sitting, BP Cuff Size: Adult)   Pulse 88   Ht 1.702 m (5' 7\")   Wt 74.4 kg (164 lb)   LMP  (LMP Unknown)   SpO2 94%   BMI 25.69 kg/m²     Physical Exam   Constitutional: She is oriented to person, place, and time. No distress.   Frail, uses a walker.   HENT:   Head: Normocephalic and atraumatic.   Eyes: Pupils are equal, round, and reactive to light. No scleral icterus.   Neck: No JVD present.   Cardiovascular: Normal rate and regular rhythm.   Murmur heard.   Systolic murmur is present with a grade of 1/6.  Pulmonary/Chest: Effort normal. No respiratory distress.   Abdominal: Soft. She exhibits no distension. There is no tenderness.   Musculoskeletal:         General: Edema present.      Comments: 1+ edema bilaterally.   Neurological: She is alert and oriented to person, place, and time.   Skin: Skin is warm and dry.       Assessment:     1. PAC (premature atrial contraction)     2. Essential hypertension     3. Left bundle branch block         Medical Decision Making:  Today's Assessment / Status / Plan:   Left bundle branch block: Remains asymptomatic.  Hypotension pressure is 95 systolic on Midrin plus " salt replacement.  She is very physically inactive.  She is asymptomatic no history of dizziness.    Lab from 1226 reviewed.  Potassium is 4.3 BUN is 16 creatinine 0.94.  Medications reviewed.  Return in 1 year.

## 2020-01-08 ENCOUNTER — APPOINTMENT (OUTPATIENT)
Dept: RADIOLOGY | Facility: MEDICAL CENTER | Age: 85
DRG: 469 | End: 2020-01-08
Attending: EMERGENCY MEDICINE
Payer: MEDICARE

## 2020-01-08 ENCOUNTER — ANESTHESIA EVENT (OUTPATIENT)
Dept: SURGERY | Facility: MEDICAL CENTER | Age: 85
DRG: 469 | End: 2020-01-08
Payer: MEDICARE

## 2020-01-08 ENCOUNTER — HOSPITAL ENCOUNTER (INPATIENT)
Facility: MEDICAL CENTER | Age: 85
LOS: 8 days | DRG: 469 | End: 2020-01-16
Attending: EMERGENCY MEDICINE | Admitting: HOSPITALIST
Payer: MEDICARE

## 2020-01-08 DIAGNOSIS — S01.81XA FACIAL LACERATION, INITIAL ENCOUNTER: ICD-10-CM

## 2020-01-08 DIAGNOSIS — J96.01 ACUTE RESPIRATORY FAILURE WITH HYPOXIA (HCC): ICD-10-CM

## 2020-01-08 DIAGNOSIS — R31.21 ASYMPTOMATIC MICROSCOPIC HEMATURIA: ICD-10-CM

## 2020-01-08 DIAGNOSIS — S42.212A CLOSED DISPLACED FRACTURE OF SURGICAL NECK OF LEFT HUMERUS, UNSPECIFIED FRACTURE MORPHOLOGY, INITIAL ENCOUNTER: ICD-10-CM

## 2020-01-08 DIAGNOSIS — S72.002A CLOSED FRACTURE OF LEFT HIP, INITIAL ENCOUNTER (HCC): ICD-10-CM

## 2020-01-08 PROBLEM — S72.009A HIP FRACTURE (HCC): Status: ACTIVE | Noted: 2020-01-08

## 2020-01-08 PROBLEM — S42.309A HUMERUS FRACTURE: Status: ACTIVE | Noted: 2020-01-08

## 2020-01-08 PROBLEM — E87.20 METABOLIC ACIDOSIS: Status: ACTIVE | Noted: 2020-01-08

## 2020-01-08 LAB
ALBUMIN SERPL BCP-MCNC: 3.5 G/DL (ref 3.2–4.9)
ALBUMIN/GLOB SERPL: 1.9 G/DL
ALP SERPL-CCNC: 45 U/L (ref 30–99)
ALT SERPL-CCNC: 7 U/L (ref 2–50)
ANION GAP SERPL CALC-SCNC: 12 MMOL/L (ref 0–11.9)
APTT PPP: 29.5 SEC (ref 24.7–36)
AST SERPL-CCNC: 10 U/L (ref 12–45)
BASOPHILS # BLD AUTO: 0.4 % (ref 0–1.8)
BASOPHILS # BLD: 0.03 K/UL (ref 0–0.12)
BILIRUB SERPL-MCNC: 0.3 MG/DL (ref 0.1–1.5)
BUN SERPL-MCNC: 12 MG/DL (ref 8–22)
CALCIUM SERPL-MCNC: 7.9 MG/DL (ref 8.4–10.2)
CHLORIDE SERPL-SCNC: 106 MMOL/L (ref 96–112)
CO2 SERPL-SCNC: 18 MMOL/L (ref 20–33)
CREAT SERPL-MCNC: 0.58 MG/DL (ref 0.5–1.4)
EOSINOPHIL # BLD AUTO: 0.08 K/UL (ref 0–0.51)
EOSINOPHIL NFR BLD: 1.1 % (ref 0–6.9)
ERYTHROCYTE [DISTWIDTH] IN BLOOD BY AUTOMATED COUNT: 44.3 FL (ref 35.9–50)
GLOBULIN SER CALC-MCNC: 1.8 G/DL (ref 1.9–3.5)
GLUCOSE SERPL-MCNC: 103 MG/DL (ref 65–99)
HCT VFR BLD AUTO: 40.5 % (ref 37–47)
HGB BLD-MCNC: 13.7 G/DL (ref 12–16)
IMM GRANULOCYTES # BLD AUTO: 0.13 K/UL (ref 0–0.11)
IMM GRANULOCYTES NFR BLD AUTO: 1.8 % (ref 0–0.9)
INR PPP: 1.09 (ref 0.87–1.13)
LYMPHOCYTES # BLD AUTO: 2.66 K/UL (ref 1–4.8)
LYMPHOCYTES NFR BLD: 36.6 % (ref 22–41)
MCH RBC QN AUTO: 32 PG (ref 27–33)
MCHC RBC AUTO-ENTMCNC: 33.8 G/DL (ref 33.6–35)
MCV RBC AUTO: 94.6 FL (ref 81.4–97.8)
MONOCYTES # BLD AUTO: 0.41 K/UL (ref 0–0.85)
MONOCYTES NFR BLD AUTO: 5.6 % (ref 0–13.4)
NEUTROPHILS # BLD AUTO: 3.95 K/UL (ref 2–7.15)
NEUTROPHILS NFR BLD: 54.5 % (ref 44–72)
NRBC # BLD AUTO: 0 K/UL
NRBC BLD-RTO: 0 /100 WBC
PLATELET # BLD AUTO: 109 K/UL (ref 164–446)
PMV BLD AUTO: 9.6 FL (ref 9–12.9)
POTASSIUM SERPL-SCNC: 3.5 MMOL/L (ref 3.6–5.5)
PROT SERPL-MCNC: 5.3 G/DL (ref 6–8.2)
PROTHROMBIN TIME: 14.3 SEC (ref 12–14.6)
RBC # BLD AUTO: 4.28 M/UL (ref 4.2–5.4)
SODIUM SERPL-SCNC: 136 MMOL/L (ref 135–145)
WBC # BLD AUTO: 7.3 K/UL (ref 4.8–10.8)

## 2020-01-08 PROCEDURE — 70450 CT HEAD/BRAIN W/O DYE: CPT

## 2020-01-08 PROCEDURE — 85610 PROTHROMBIN TIME: CPT

## 2020-01-08 PROCEDURE — 304999 HCHG REPAIR-SIMPLE/INTERMED LEVEL 1

## 2020-01-08 PROCEDURE — 96374 THER/PROPH/DIAG INJ IV PUSH: CPT

## 2020-01-08 PROCEDURE — 3E0234Z INTRODUCTION OF SERUM, TOXOID AND VACCINE INTO MUSCLE, PERCUTANEOUS APPROACH: ICD-10-PCS | Performed by: EMERGENCY MEDICINE

## 2020-01-08 PROCEDURE — 94760 N-INVAS EAR/PLS OXIMETRY 1: CPT

## 2020-01-08 PROCEDURE — 303105 HCHG CATHETER EXTRA

## 2020-01-08 PROCEDURE — 99285 EMERGENCY DEPT VISIT HI MDM: CPT

## 2020-01-08 PROCEDURE — 73030 X-RAY EXAM OF SHOULDER: CPT | Mod: LT

## 2020-01-08 PROCEDURE — 700105 HCHG RX REV CODE 258: Performed by: HOSPITALIST

## 2020-01-08 PROCEDURE — 0HQ1XZZ REPAIR FACE SKIN, EXTERNAL APPROACH: ICD-10-PCS | Performed by: EMERGENCY MEDICINE

## 2020-01-08 PROCEDURE — 71045 X-RAY EXAM CHEST 1 VIEW: CPT

## 2020-01-08 PROCEDURE — 700101 HCHG RX REV CODE 250: Performed by: EMERGENCY MEDICINE

## 2020-01-08 PROCEDURE — 73552 X-RAY EXAM OF FEMUR 2/>: CPT | Mod: LT

## 2020-01-08 PROCEDURE — 99223 1ST HOSP IP/OBS HIGH 75: CPT | Mod: AI | Performed by: HOSPITALIST

## 2020-01-08 PROCEDURE — 90714 TD VACC NO PRESV 7 YRS+ IM: CPT | Performed by: EMERGENCY MEDICINE

## 2020-01-08 PROCEDURE — 700111 HCHG RX REV CODE 636 W/ 250 OVERRIDE (IP): Performed by: EMERGENCY MEDICINE

## 2020-01-08 PROCEDURE — 80053 COMPREHEN METABOLIC PANEL: CPT

## 2020-01-08 PROCEDURE — 96375 TX/PRO/DX INJ NEW DRUG ADDON: CPT

## 2020-01-08 PROCEDURE — 85730 THROMBOPLASTIN TIME PARTIAL: CPT

## 2020-01-08 PROCEDURE — A9270 NON-COVERED ITEM OR SERVICE: HCPCS | Performed by: HOSPITALIST

## 2020-01-08 PROCEDURE — 304217 HCHG IRRIGATION SYSTEM

## 2020-01-08 PROCEDURE — 770006 HCHG ROOM/CARE - MED/SURG/GYN SEMI*

## 2020-01-08 PROCEDURE — 303747 HCHG EXTRA SUTURE

## 2020-01-08 PROCEDURE — 85025 COMPLETE CBC W/AUTO DIFF WBC: CPT

## 2020-01-08 PROCEDURE — 700102 HCHG RX REV CODE 250 W/ 637 OVERRIDE(OP): Performed by: HOSPITALIST

## 2020-01-08 PROCEDURE — 303485 HCHG DRESSING MEDIUM

## 2020-01-08 PROCEDURE — 700111 HCHG RX REV CODE 636 W/ 250 OVERRIDE (IP): Performed by: HOSPITALIST

## 2020-01-08 PROCEDURE — 72170 X-RAY EXAM OF PELVIS: CPT

## 2020-01-08 PROCEDURE — 73060 X-RAY EXAM OF HUMERUS: CPT | Mod: LT

## 2020-01-08 PROCEDURE — 51702 INSERT TEMP BLADDER CATH: CPT

## 2020-01-08 RX ORDER — POLYETHYLENE GLYCOL 3350 17 G/17G
1 POWDER, FOR SOLUTION ORAL
Status: DISCONTINUED | OUTPATIENT
Start: 2020-01-08 | End: 2020-01-16 | Stop reason: HOSPADM

## 2020-01-08 RX ORDER — BUDESONIDE AND FORMOTEROL FUMARATE DIHYDRATE 160; 4.5 UG/1; UG/1
2 AEROSOL RESPIRATORY (INHALATION)
Status: DISCONTINUED | OUTPATIENT
Start: 2020-01-08 | End: 2020-01-08

## 2020-01-08 RX ORDER — OXYCODONE HYDROCHLORIDE 5 MG/1
2.5 TABLET ORAL
Status: DISCONTINUED | OUTPATIENT
Start: 2020-01-08 | End: 2020-01-16 | Stop reason: HOSPADM

## 2020-01-08 RX ORDER — OXCARBAZEPINE 300 MG/1
300 TABLET, FILM COATED ORAL DAILY
Status: DISCONTINUED | OUTPATIENT
Start: 2020-01-09 | End: 2020-01-16 | Stop reason: HOSPADM

## 2020-01-08 RX ORDER — HYDROMORPHONE HYDROCHLORIDE 1 MG/ML
0.25 INJECTION, SOLUTION INTRAMUSCULAR; INTRAVENOUS; SUBCUTANEOUS
Status: DISCONTINUED | OUTPATIENT
Start: 2020-01-08 | End: 2020-01-11

## 2020-01-08 RX ORDER — MIDODRINE HYDROCHLORIDE 2.5 MG/1
5 TABLET ORAL 2 TIMES DAILY
Status: DISCONTINUED | OUTPATIENT
Start: 2020-01-08 | End: 2020-01-16 | Stop reason: HOSPADM

## 2020-01-08 RX ORDER — SODIUM CHLORIDE 1 G/1
1 TABLET ORAL 2 TIMES DAILY
Status: DISCONTINUED | OUTPATIENT
Start: 2020-01-08 | End: 2020-01-13

## 2020-01-08 RX ORDER — POTASSIUM CHLORIDE 20 MEQ/1
40 TABLET, EXTENDED RELEASE ORAL ONCE
Status: COMPLETED | OUTPATIENT
Start: 2020-01-08 | End: 2020-01-08

## 2020-01-08 RX ORDER — DIVALPROEX SODIUM 250 MG/1
1000 TABLET, EXTENDED RELEASE ORAL
Status: DISCONTINUED | OUTPATIENT
Start: 2020-01-08 | End: 2020-01-16 | Stop reason: HOSPADM

## 2020-01-08 RX ORDER — ONDANSETRON 2 MG/ML
4 INJECTION INTRAMUSCULAR; INTRAVENOUS EVERY 4 HOURS PRN
Status: DISCONTINUED | OUTPATIENT
Start: 2020-01-08 | End: 2020-01-16 | Stop reason: HOSPADM

## 2020-01-08 RX ORDER — MORPHINE SULFATE 4 MG/ML
2 INJECTION, SOLUTION INTRAMUSCULAR; INTRAVENOUS ONCE
Status: COMPLETED | OUTPATIENT
Start: 2020-01-08 | End: 2020-01-08

## 2020-01-08 RX ORDER — POTASSIUM CHLORIDE 20 MEQ/1
10 TABLET, EXTENDED RELEASE ORAL 2 TIMES DAILY
Status: DISCONTINUED | OUTPATIENT
Start: 2020-01-08 | End: 2020-01-16 | Stop reason: HOSPADM

## 2020-01-08 RX ORDER — BISACODYL 10 MG
10 SUPPOSITORY, RECTAL RECTAL
Status: DISCONTINUED | OUTPATIENT
Start: 2020-01-08 | End: 2020-01-16 | Stop reason: HOSPADM

## 2020-01-08 RX ORDER — LIDOCAINE HYDROCHLORIDE AND EPINEPHRINE BITARTRATE 20; .01 MG/ML; MG/ML
10 INJECTION, SOLUTION SUBCUTANEOUS ONCE
Status: COMPLETED | OUTPATIENT
Start: 2020-01-08 | End: 2020-01-08

## 2020-01-08 RX ORDER — FLUTICASONE PROPIONATE 50 MCG
2 SPRAY, SUSPENSION (ML) NASAL DAILY
Status: DISCONTINUED | OUTPATIENT
Start: 2020-01-09 | End: 2020-01-16 | Stop reason: HOSPADM

## 2020-01-08 RX ORDER — ACETAMINOPHEN 325 MG/1
650 TABLET ORAL EVERY 6 HOURS PRN
Status: DISCONTINUED | OUTPATIENT
Start: 2020-01-08 | End: 2020-01-16 | Stop reason: HOSPADM

## 2020-01-08 RX ORDER — FAMOTIDINE 20 MG/1
20 TABLET, FILM COATED ORAL 2 TIMES DAILY
Status: DISCONTINUED | OUTPATIENT
Start: 2020-01-08 | End: 2020-01-11

## 2020-01-08 RX ORDER — SODIUM CHLORIDE, SODIUM LACTATE, POTASSIUM CHLORIDE, CALCIUM CHLORIDE 600; 310; 30; 20 MG/100ML; MG/100ML; MG/100ML; MG/100ML
INJECTION, SOLUTION INTRAVENOUS CONTINUOUS
Status: DISCONTINUED | OUTPATIENT
Start: 2020-01-08 | End: 2020-01-11

## 2020-01-08 RX ORDER — ONDANSETRON 4 MG/1
4 TABLET, ORALLY DISINTEGRATING ORAL EVERY 4 HOURS PRN
Status: DISCONTINUED | OUTPATIENT
Start: 2020-01-08 | End: 2020-01-16 | Stop reason: HOSPADM

## 2020-01-08 RX ORDER — METHENAMINE HIPPURATE 1000 MG/1
1 TABLET ORAL DAILY
Status: DISCONTINUED | OUTPATIENT
Start: 2020-01-09 | End: 2020-01-16 | Stop reason: HOSPADM

## 2020-01-08 RX ORDER — ENALAPRILAT 1.25 MG/ML
1.25 INJECTION INTRAVENOUS EVERY 6 HOURS PRN
Status: DISCONTINUED | OUTPATIENT
Start: 2020-01-08 | End: 2020-01-16 | Stop reason: HOSPADM

## 2020-01-08 RX ORDER — AMOXICILLIN 250 MG
2 CAPSULE ORAL 2 TIMES DAILY
Status: DISCONTINUED | OUTPATIENT
Start: 2020-01-08 | End: 2020-01-16 | Stop reason: HOSPADM

## 2020-01-08 RX ORDER — ONDANSETRON 2 MG/ML
4 INJECTION INTRAMUSCULAR; INTRAVENOUS ONCE
Status: COMPLETED | OUTPATIENT
Start: 2020-01-08 | End: 2020-01-08

## 2020-01-08 RX ORDER — MELOXICAM 7.5 MG/1
7.5 TABLET ORAL DAILY
Status: DISCONTINUED | OUTPATIENT
Start: 2020-01-09 | End: 2020-01-13

## 2020-01-08 RX ORDER — DARIFENACIN HYDROBROMIDE 15 MG/1
15 TABLET, EXTENDED RELEASE ORAL DAILY
Status: DISCONTINUED | OUTPATIENT
Start: 2020-01-09 | End: 2020-01-08

## 2020-01-08 RX ORDER — OXYCODONE HYDROCHLORIDE 5 MG/1
5 TABLET ORAL
Status: DISCONTINUED | OUTPATIENT
Start: 2020-01-08 | End: 2020-01-16 | Stop reason: HOSPADM

## 2020-01-08 RX ORDER — BUDESONIDE AND FORMOTEROL FUMARATE DIHYDRATE 160; 4.5 UG/1; UG/1
2 AEROSOL RESPIRATORY (INHALATION) 2 TIMES DAILY
Status: DISCONTINUED | OUTPATIENT
Start: 2020-01-09 | End: 2020-01-16 | Stop reason: HOSPADM

## 2020-01-08 RX ORDER — GABAPENTIN 100 MG/1
100 CAPSULE ORAL DAILY
Status: DISCONTINUED | OUTPATIENT
Start: 2020-01-09 | End: 2020-01-16 | Stop reason: HOSPADM

## 2020-01-08 RX ADMIN — MIDODRINE HYDROCHLORIDE 5 MG: 5 TABLET ORAL at 22:12

## 2020-01-08 RX ADMIN — ONDANSETRON HYDROCHLORIDE 4 MG: 2 SOLUTION INTRAMUSCULAR; INTRAVENOUS at 19:41

## 2020-01-08 RX ADMIN — LIDOCAINE HYDROCHLORIDE AND EPINEPHRINE 10 ML: 20; 10 INJECTION, SOLUTION INFILTRATION; PERINEURAL at 19:27

## 2020-01-08 RX ADMIN — MORPHINE SULFATE 2 MG: 4 INJECTION INTRAVENOUS at 18:30

## 2020-01-08 RX ADMIN — FAMOTIDINE 20 MG: 20 TABLET ORAL at 22:14

## 2020-01-08 RX ADMIN — CLOSTRIDIUM TETANI TOXOID ANTIGEN (FORMALDEHYDE INACTIVATED) AND CORYNEBACTERIUM DIPHTHERIAE TOXOID ANTIGEN (FORMALDEHYDE INACTIVATED) 0.5 ML: 5; 2 INJECTION, SUSPENSION INTRAMUSCULAR at 19:27

## 2020-01-08 RX ADMIN — SODIUM CHLORIDE TAB 1 GM 1 G: 1 TAB at 22:13

## 2020-01-08 RX ADMIN — SODIUM CHLORIDE, POTASSIUM CHLORIDE, SODIUM LACTATE AND CALCIUM CHLORIDE: 600; 310; 30; 20 INJECTION, SOLUTION INTRAVENOUS at 22:13

## 2020-01-08 RX ADMIN — BUDESONIDE AND FORMOTEROL FUMARATE DIHYDRATE 2 PUFF: 160; 4.5 AEROSOL RESPIRATORY (INHALATION) at 22:13

## 2020-01-08 RX ADMIN — DIVALPROEX SODIUM 1000 MG: 250 TABLET, FILM COATED, EXTENDED RELEASE ORAL at 22:12

## 2020-01-08 RX ADMIN — POTASSIUM CHLORIDE 40 MEQ: 20 TABLET, EXTENDED RELEASE ORAL at 22:12

## 2020-01-08 SDOH — ECONOMIC STABILITY: TRANSPORTATION INSECURITY
IN THE PAST 12 MONTHS, HAS THE LACK OF TRANSPORTATION KEPT YOU FROM MEDICAL APPOINTMENTS OR FROM GETTING MEDICATIONS?: PATIENT DECLINED

## 2020-01-08 SDOH — ECONOMIC STABILITY: FOOD INSECURITY: WITHIN THE PAST 12 MONTHS, YOU WORRIED THAT YOUR FOOD WOULD RUN OUT BEFORE YOU GOT MONEY TO BUY MORE.: PATIENT DECLINED

## 2020-01-08 SDOH — ECONOMIC STABILITY: TRANSPORTATION INSECURITY
IN THE PAST 12 MONTHS, HAS LACK OF TRANSPORTATION KEPT YOU FROM MEETINGS, WORK, OR FROM GETTING THINGS NEEDED FOR DAILY LIVING?: PATIENT DECLINED

## 2020-01-08 SDOH — ECONOMIC STABILITY: FOOD INSECURITY: WITHIN THE PAST 12 MONTHS, THE FOOD YOU BOUGHT JUST DIDN'T LAST AND YOU DIDN'T HAVE MONEY TO GET MORE.: PATIENT DECLINED

## 2020-01-08 ASSESSMENT — COGNITIVE AND FUNCTIONAL STATUS - GENERAL
HELP NEEDED FOR BATHING: A LOT
TURNING FROM BACK TO SIDE WHILE IN FLAT BAD: A LOT
SUGGESTED CMS G CODE MODIFIER DAILY ACTIVITY: CL
MOVING FROM LYING ON BACK TO SITTING ON SIDE OF FLAT BED: A LOT
DRESSING REGULAR LOWER BODY CLOTHING: A LOT
CLIMB 3 TO 5 STEPS WITH RAILING: TOTAL
DAILY ACTIVITIY SCORE: 12
TOILETING: A LOT
STANDING UP FROM CHAIR USING ARMS: TOTAL
MOBILITY SCORE: 8
DRESSING REGULAR UPPER BODY CLOTHING: A LOT
SUGGESTED CMS G CODE MODIFIER MOBILITY: CM
WALKING IN HOSPITAL ROOM: TOTAL
PERSONAL GROOMING: A LOT
EATING MEALS: A LOT
MOVING TO AND FROM BED TO CHAIR: UNABLE

## 2020-01-08 ASSESSMENT — ENCOUNTER SYMPTOMS
BACK PAIN: 0
INSOMNIA: 0
SHORTNESS OF BREATH: 0
DIZZINESS: 0
NAUSEA: 0
NECK PAIN: 0
COUGH: 0
DEPRESSION: 0
HEADACHES: 0
BLURRED VISION: 0
EYE PAIN: 0
VOMITING: 0
CHILLS: 0
ABDOMINAL PAIN: 0
TINGLING: 0
SORE THROAT: 0
FEVER: 0
PALPITATIONS: 0

## 2020-01-08 ASSESSMENT — LIFESTYLE VARIABLES
EVER HAD A DRINK FIRST THING IN THE MORNING TO STEADY YOUR NERVES TO GET RID OF A HANGOVER: NO
DOES PATIENT WANT TO STOP DRINKING: NO
EVER_SMOKED: NEVER
TOTAL SCORE: 0
CONSUMPTION TOTAL: NEGATIVE
HAVE PEOPLE ANNOYED YOU BY CRITICIZING YOUR DRINKING: NO
TOTAL SCORE: 0
AVERAGE NUMBER OF DAYS PER WEEK YOU HAVE A DRINK CONTAINING ALCOHOL: 0
HAVE YOU EVER FELT YOU SHOULD CUT DOWN ON YOUR DRINKING: NO
TOTAL SCORE: 0
ALCOHOL_USE: NO
ON A TYPICAL DAY WHEN YOU DRINK ALCOHOL HOW MANY DRINKS DO YOU HAVE: 0
HOW MANY TIMES IN THE PAST YEAR HAVE YOU HAD 5 OR MORE DRINKS IN A DAY: 0
EVER FELT BAD OR GUILTY ABOUT YOUR DRINKING: NO

## 2020-01-08 ASSESSMENT — COPD QUESTIONNAIRES
HAVE YOU SMOKED AT LEAST 100 CIGARETTES IN YOUR ENTIRE LIFE: NO/DON'T KNOW
COPD SCREENING SCORE: 3
DURING THE PAST 4 WEEKS HOW MUCH DID YOU FEEL SHORT OF BREATH: SOME OF THE TIME
DO YOU EVER COUGH UP ANY MUCUS OR PHLEGM?: NO/ONLY WITH OCCASIONAL COLDS OR INFECTIONS
IN THE PAST 12 MONTHS DO YOU DO LESS THAN YOU USED TO BECAUSE OF YOUR BREATHING PROBLEMS: DISAGREE/UNSURE

## 2020-01-08 ASSESSMENT — PATIENT HEALTH QUESTIONNAIRE - PHQ9
SUM OF ALL RESPONSES TO PHQ9 QUESTIONS 1 AND 2: 0
2. FEELING DOWN, DEPRESSED, IRRITABLE, OR HOPELESS: NOT AT ALL
1. LITTLE INTEREST OR PLEASURE IN DOING THINGS: NOT AT ALL

## 2020-01-08 NOTE — ED TRIAGE NOTES
Pt to er with c/o left hip and shoulder pain along with noted laceration to left upper eye brow after grown level fall at [facility] today. Denies loc, remsa reports no oxygen in use upon arrival though wears 2.5L n/c per pt. Outward rotation of left leg noted with ice bag applied to left hip. Bleeding controlled to head lac, daily asa noted on med rec, unknwon last tetanus. Pt awake,alert, oriented to person and place

## 2020-01-09 ENCOUNTER — APPOINTMENT (OUTPATIENT)
Dept: RADIOLOGY | Facility: MEDICAL CENTER | Age: 85
DRG: 469 | End: 2020-01-09
Attending: ORTHOPAEDIC SURGERY
Payer: MEDICARE

## 2020-01-09 ENCOUNTER — ANESTHESIA (OUTPATIENT)
Dept: SURGERY | Facility: MEDICAL CENTER | Age: 85
DRG: 469 | End: 2020-01-09
Payer: MEDICARE

## 2020-01-09 LAB
ALBUMIN SERPL BCP-MCNC: 3.5 G/DL (ref 3.2–4.9)
ALBUMIN/GLOB SERPL: 1.8 G/DL
ALP SERPL-CCNC: 42 U/L (ref 30–99)
ALT SERPL-CCNC: 7 U/L (ref 2–50)
ANION GAP SERPL CALC-SCNC: 15 MMOL/L (ref 0–11.9)
AST SERPL-CCNC: 11 U/L (ref 12–45)
BASOPHILS # BLD AUTO: 0.3 % (ref 0–1.8)
BASOPHILS # BLD: 0.04 K/UL (ref 0–0.12)
BILIRUB SERPL-MCNC: 0.4 MG/DL (ref 0.1–1.5)
BUN SERPL-MCNC: 13 MG/DL (ref 8–22)
CALCIUM SERPL-MCNC: 8.9 MG/DL (ref 8.4–10.2)
CHLORIDE SERPL-SCNC: 104 MMOL/L (ref 96–112)
CO2 SERPL-SCNC: 17 MMOL/L (ref 20–33)
CREAT SERPL-MCNC: 0.58 MG/DL (ref 0.5–1.4)
EKG IMPRESSION: NORMAL
EKG IMPRESSION: NORMAL
EOSINOPHIL # BLD AUTO: 0 K/UL (ref 0–0.51)
EOSINOPHIL NFR BLD: 0 % (ref 0–6.9)
ERYTHROCYTE [DISTWIDTH] IN BLOOD BY AUTOMATED COUNT: 45.6 FL (ref 35.9–50)
GLOBULIN SER CALC-MCNC: 2 G/DL (ref 1.9–3.5)
GLUCOSE SERPL-MCNC: 115 MG/DL (ref 65–99)
HCT VFR BLD AUTO: 35.7 % (ref 37–47)
HGB BLD-MCNC: 11.8 G/DL (ref 12–16)
IMM GRANULOCYTES # BLD AUTO: 0.15 K/UL (ref 0–0.11)
IMM GRANULOCYTES NFR BLD AUTO: 1.2 % (ref 0–0.9)
LYMPHOCYTES # BLD AUTO: 2.02 K/UL (ref 1–4.8)
LYMPHOCYTES NFR BLD: 15.7 % (ref 22–41)
MCH RBC QN AUTO: 32.1 PG (ref 27–33)
MCHC RBC AUTO-ENTMCNC: 33.1 G/DL (ref 33.6–35)
MCV RBC AUTO: 97 FL (ref 81.4–97.8)
MONOCYTES # BLD AUTO: 0.77 K/UL (ref 0–0.85)
MONOCYTES NFR BLD AUTO: 6 % (ref 0–13.4)
NEUTROPHILS # BLD AUTO: 9.91 K/UL (ref 2–7.15)
NEUTROPHILS NFR BLD: 76.8 % (ref 44–72)
NRBC # BLD AUTO: 0 K/UL
NRBC BLD-RTO: 0 /100 WBC
PLATELET # BLD AUTO: 118 K/UL (ref 164–446)
PMV BLD AUTO: 9.9 FL (ref 9–12.9)
POTASSIUM SERPL-SCNC: 4.9 MMOL/L (ref 3.6–5.5)
PROT SERPL-MCNC: 5.5 G/DL (ref 6–8.2)
RBC # BLD AUTO: 3.68 M/UL (ref 4.2–5.4)
SODIUM SERPL-SCNC: 136 MMOL/L (ref 135–145)
WBC # BLD AUTO: 12.9 K/UL (ref 4.8–10.8)

## 2020-01-09 PROCEDURE — 160031 HCHG SURGERY MINUTES - 1ST 30 MINS LEVEL 5: Performed by: ORTHOPAEDIC SURGERY

## 2020-01-09 PROCEDURE — 700111 HCHG RX REV CODE 636 W/ 250 OVERRIDE (IP): Performed by: ANESTHESIOLOGY

## 2020-01-09 PROCEDURE — 502000 HCHG MISC OR IMPLANTS RC 0278: Performed by: ORTHOPAEDIC SURGERY

## 2020-01-09 PROCEDURE — 0SRS019 REPLACEMENT OF LEFT HIP JOINT, FEMORAL SURFACE WITH METAL SYNTHETIC SUBSTITUTE, CEMENTED, OPEN APPROACH: ICD-10-PCS | Performed by: ORTHOPAEDIC SURGERY

## 2020-01-09 PROCEDURE — A6223 GAUZE >16<=48 NO W/SAL W/O B: HCPCS | Performed by: ORTHOPAEDIC SURGERY

## 2020-01-09 PROCEDURE — A4450 NON-WATERPROOF TAPE: HCPCS | Performed by: ORTHOPAEDIC SURGERY

## 2020-01-09 PROCEDURE — A4314 CATH W/DRAINAGE 2-WAY LATEX: HCPCS | Performed by: ORTHOPAEDIC SURGERY

## 2020-01-09 PROCEDURE — 93005 ELECTROCARDIOGRAM TRACING: CPT | Performed by: ANESTHESIOLOGY

## 2020-01-09 PROCEDURE — 700101 HCHG RX REV CODE 250: Performed by: ANESTHESIOLOGY

## 2020-01-09 PROCEDURE — 770006 HCHG ROOM/CARE - MED/SURG/GYN SEMI*

## 2020-01-09 PROCEDURE — 160009 HCHG ANES TIME/MIN: Performed by: ORTHOPAEDIC SURGERY

## 2020-01-09 PROCEDURE — L8699 PROSTHETIC IMPLANT NOS: HCPCS | Performed by: ORTHOPAEDIC SURGERY

## 2020-01-09 PROCEDURE — 93010 ELECTROCARDIOGRAM REPORT: CPT | Performed by: INTERNAL MEDICINE

## 2020-01-09 PROCEDURE — 501838 HCHG SUTURE GENERAL: Performed by: ORTHOPAEDIC SURGERY

## 2020-01-09 PROCEDURE — 160002 HCHG RECOVERY MINUTES (STAT): Performed by: ORTHOPAEDIC SURGERY

## 2020-01-09 PROCEDURE — A9270 NON-COVERED ITEM OR SERVICE: HCPCS | Performed by: HOSPITALIST

## 2020-01-09 PROCEDURE — 73501 X-RAY EXAM HIP UNI 1 VIEW: CPT | Mod: LT

## 2020-01-09 PROCEDURE — 80053 COMPREHEN METABOLIC PANEL: CPT

## 2020-01-09 PROCEDURE — 700111 HCHG RX REV CODE 636 W/ 250 OVERRIDE (IP): Performed by: ORTHOPAEDIC SURGERY

## 2020-01-09 PROCEDURE — 160048 HCHG OR STATISTICAL LEVEL 1-5: Performed by: ORTHOPAEDIC SURGERY

## 2020-01-09 PROCEDURE — 160035 HCHG PACU - 1ST 60 MINS PHASE I: Performed by: ORTHOPAEDIC SURGERY

## 2020-01-09 PROCEDURE — 160042 HCHG SURGERY MINUTES - EA ADDL 1 MIN LEVEL 5: Performed by: ORTHOPAEDIC SURGERY

## 2020-01-09 PROCEDURE — 99232 SBSQ HOSP IP/OBS MODERATE 35: CPT | Performed by: INTERNAL MEDICINE

## 2020-01-09 PROCEDURE — 700105 HCHG RX REV CODE 258: Performed by: HOSPITALIST

## 2020-01-09 PROCEDURE — 85025 COMPLETE CBC W/AUTO DIFF WBC: CPT

## 2020-01-09 PROCEDURE — 36415 COLL VENOUS BLD VENIPUNCTURE: CPT

## 2020-01-09 PROCEDURE — 700101 HCHG RX REV CODE 250: Performed by: ORTHOPAEDIC SURGERY

## 2020-01-09 PROCEDURE — C1713 ANCHOR/SCREW BN/BN,TIS/BN: HCPCS | Performed by: ORTHOPAEDIC SURGERY

## 2020-01-09 PROCEDURE — 700102 HCHG RX REV CODE 250 W/ 637 OVERRIDE(OP): Performed by: HOSPITALIST

## 2020-01-09 PROCEDURE — 700105 HCHG RX REV CODE 258: Performed by: ORTHOPAEDIC SURGERY

## 2020-01-09 DEVICE — BONE CEMENT SIMPLEX FULL DOSE - (10EA/PK): Type: IMPLANTABLE DEVICE | Site: HIP | Status: FUNCTIONAL

## 2020-01-09 DEVICE — IMPLANT TANDEM BIPOLAR COCR 48OD 28ID: Type: IMPLANTABLE DEVICE | Site: HIP | Status: FUNCTIONAL

## 2020-01-09 DEVICE — DISTAL INVIS CENT SZ 10MM: Type: IMPLANTABLE DEVICE | Site: HIP | Status: FUNCTIONAL

## 2020-01-09 DEVICE — IMPLANT COCR 12/14 FEM HEAD 28 +0: Type: IMPLANTABLE DEVICE | Site: HIP | Status: FUNCTIONAL

## 2020-01-09 DEVICE — HIP DALL MILES SET 2.0 V BEAD: Type: IMPLANTABLE DEVICE | Site: HIP | Status: FUNCTIONAL

## 2020-01-09 DEVICE — IMPLANTABLE DEVICE: Type: IMPLANTABLE DEVICE | Site: HIP | Status: FUNCTIONAL

## 2020-01-09 RX ORDER — HALOPERIDOL 5 MG/ML
1 INJECTION INTRAMUSCULAR
Status: DISCONTINUED | OUTPATIENT
Start: 2020-01-09 | End: 2020-01-09 | Stop reason: HOSPADM

## 2020-01-09 RX ORDER — DIPHENHYDRAMINE HYDROCHLORIDE 50 MG/ML
6.25 INJECTION INTRAMUSCULAR; INTRAVENOUS
Status: DISCONTINUED | OUTPATIENT
Start: 2020-01-09 | End: 2020-01-09 | Stop reason: HOSPADM

## 2020-01-09 RX ORDER — OXYCODONE HCL 5 MG/5 ML
5 SOLUTION, ORAL ORAL
Status: DISCONTINUED | OUTPATIENT
Start: 2020-01-09 | End: 2020-01-09 | Stop reason: HOSPADM

## 2020-01-09 RX ORDER — HYDROMORPHONE HYDROCHLORIDE 1 MG/ML
0.2 INJECTION, SOLUTION INTRAMUSCULAR; INTRAVENOUS; SUBCUTANEOUS
Status: DISCONTINUED | OUTPATIENT
Start: 2020-01-09 | End: 2020-01-09 | Stop reason: HOSPADM

## 2020-01-09 RX ORDER — KETAMINE HYDROCHLORIDE 50 MG/ML
INJECTION, SOLUTION INTRAMUSCULAR; INTRAVENOUS PRN
Status: DISCONTINUED | OUTPATIENT
Start: 2020-01-09 | End: 2020-01-09 | Stop reason: SURG

## 2020-01-09 RX ORDER — BUPIVACAINE HYDROCHLORIDE AND EPINEPHRINE 5; 5 MG/ML; UG/ML
INJECTION, SOLUTION PERINEURAL
Status: DISCONTINUED | OUTPATIENT
Start: 2020-01-09 | End: 2020-01-09 | Stop reason: HOSPADM

## 2020-01-09 RX ORDER — DEXAMETHASONE SODIUM PHOSPHATE 4 MG/ML
INJECTION, SOLUTION INTRA-ARTICULAR; INTRALESIONAL; INTRAMUSCULAR; INTRAVENOUS; SOFT TISSUE PRN
Status: DISCONTINUED | OUTPATIENT
Start: 2020-01-09 | End: 2020-01-09 | Stop reason: SURG

## 2020-01-09 RX ORDER — ONDANSETRON 2 MG/ML
4 INJECTION INTRAMUSCULAR; INTRAVENOUS
Status: DISCONTINUED | OUTPATIENT
Start: 2020-01-09 | End: 2020-01-09 | Stop reason: HOSPADM

## 2020-01-09 RX ORDER — VANCOMYCIN HYDROCHLORIDE 1 G/20ML
INJECTION, POWDER, LYOPHILIZED, FOR SOLUTION INTRAVENOUS
Status: COMPLETED | OUTPATIENT
Start: 2020-01-09 | End: 2020-01-09

## 2020-01-09 RX ORDER — HYDROMORPHONE HYDROCHLORIDE 1 MG/ML
0.1 INJECTION, SOLUTION INTRAMUSCULAR; INTRAVENOUS; SUBCUTANEOUS
Status: DISCONTINUED | OUTPATIENT
Start: 2020-01-09 | End: 2020-01-09 | Stop reason: HOSPADM

## 2020-01-09 RX ORDER — ONDANSETRON 2 MG/ML
INJECTION INTRAMUSCULAR; INTRAVENOUS PRN
Status: DISCONTINUED | OUTPATIENT
Start: 2020-01-09 | End: 2020-01-09 | Stop reason: SURG

## 2020-01-09 RX ORDER — CEFAZOLIN SODIUM 1 G/3ML
INJECTION, POWDER, FOR SOLUTION INTRAMUSCULAR; INTRAVENOUS PRN
Status: DISCONTINUED | OUTPATIENT
Start: 2020-01-09 | End: 2020-01-09 | Stop reason: SURG

## 2020-01-09 RX ORDER — SODIUM CHLORIDE, SODIUM LACTATE, POTASSIUM CHLORIDE, CALCIUM CHLORIDE 600; 310; 30; 20 MG/100ML; MG/100ML; MG/100ML; MG/100ML
INJECTION, SOLUTION INTRAVENOUS CONTINUOUS
Status: DISCONTINUED | OUTPATIENT
Start: 2020-01-09 | End: 2020-01-09 | Stop reason: HOSPADM

## 2020-01-09 RX ADMIN — PROPOFOL 50 MG: 10 INJECTION, EMULSION INTRAVENOUS at 15:07

## 2020-01-09 RX ADMIN — ONDANSETRON 4 MG: 2 INJECTION INTRAMUSCULAR; INTRAVENOUS at 15:07

## 2020-01-09 RX ADMIN — FENTANYL CITRATE 25 MCG: 50 INJECTION, SOLUTION INTRAMUSCULAR; INTRAVENOUS at 16:33

## 2020-01-09 RX ADMIN — SODIUM CHLORIDE TAB 1 GM 1 G: 1 TAB at 06:33

## 2020-01-09 RX ADMIN — DEXAMETHASONE SODIUM PHOSPHATE 4 MG: 4 INJECTION, SOLUTION INTRAMUSCULAR; INTRAVENOUS at 15:07

## 2020-01-09 RX ADMIN — CEFAZOLIN 2 G: 10 INJECTION, POWDER, FOR SOLUTION INTRAVENOUS at 22:03

## 2020-01-09 RX ADMIN — FENTANYL CITRATE 25 MCG: 50 INJECTION, SOLUTION INTRAMUSCULAR; INTRAVENOUS at 15:49

## 2020-01-09 RX ADMIN — MIDODRINE HYDROCHLORIDE 5 MG: 5 TABLET ORAL at 06:33

## 2020-01-09 RX ADMIN — FENTANYL CITRATE 25 MCG: 50 INJECTION, SOLUTION INTRAMUSCULAR; INTRAVENOUS at 17:27

## 2020-01-09 RX ADMIN — KETAMINE HYDROCHLORIDE 50 MG: 50 INJECTION, SOLUTION, CONCENTRATE INTRAMUSCULAR; INTRAVENOUS at 15:07

## 2020-01-09 RX ADMIN — FENTANYL CITRATE 25 MCG: 50 INJECTION, SOLUTION INTRAMUSCULAR; INTRAVENOUS at 17:03

## 2020-01-09 RX ADMIN — FENTANYL CITRATE 50 MCG: 50 INJECTION, SOLUTION INTRAMUSCULAR; INTRAVENOUS at 15:07

## 2020-01-09 RX ADMIN — EPHEDRINE SULFATE 5 MG: 50 INJECTION INTRAMUSCULAR; INTRAVENOUS; SUBCUTANEOUS at 16:54

## 2020-01-09 RX ADMIN — METHENAMINE HIPPURATE 1 G: 1 TABLET ORAL at 06:33

## 2020-01-09 RX ADMIN — SODIUM CHLORIDE, POTASSIUM CHLORIDE, SODIUM LACTATE AND CALCIUM CHLORIDE: 600; 310; 30; 20 INJECTION, SOLUTION INTRAVENOUS at 15:03

## 2020-01-09 RX ADMIN — GABAPENTIN 100 MG: 100 CAPSULE ORAL at 06:33

## 2020-01-09 RX ADMIN — SODIUM CHLORIDE, POTASSIUM CHLORIDE, SODIUM LACTATE AND CALCIUM CHLORIDE: 600; 310; 30; 20 INJECTION, SOLUTION INTRAVENOUS at 17:11

## 2020-01-09 RX ADMIN — OXYCODONE HYDROCHLORIDE 2.5 MG: 5 TABLET ORAL at 10:24

## 2020-01-09 RX ADMIN — POTASSIUM CHLORIDE 10 MEQ: 20 TABLET, EXTENDED RELEASE ORAL at 06:33

## 2020-01-09 RX ADMIN — OXCARBAZEPINE 300 MG: 300 TABLET, FILM COATED ORAL at 06:33

## 2020-01-09 RX ADMIN — FAMOTIDINE 20 MG: 20 TABLET ORAL at 06:33

## 2020-01-09 RX ADMIN — MELOXICAM 7.5 MG: 7.5 TABLET ORAL at 06:33

## 2020-01-09 RX ADMIN — FENTANYL CITRATE 50 MCG: 50 INJECTION, SOLUTION INTRAMUSCULAR; INTRAVENOUS at 15:31

## 2020-01-09 RX ADMIN — BUDESONIDE AND FORMOTEROL FUMARATE DIHYDRATE 2 PUFF: 160; 4.5 AEROSOL RESPIRATORY (INHALATION) at 06:32

## 2020-01-09 RX ADMIN — SODIUM CHLORIDE, POTASSIUM CHLORIDE, SODIUM LACTATE AND CALCIUM CHLORIDE: 600; 310; 30; 20 INJECTION, SOLUTION INTRAVENOUS at 08:11

## 2020-01-09 RX ADMIN — CEFAZOLIN 2 G: 1 INJECTION, POWDER, FOR SOLUTION INTRAVENOUS at 15:07

## 2020-01-09 RX ADMIN — DIVALPROEX SODIUM 1000 MG: 250 TABLET, FILM COATED, EXTENDED RELEASE ORAL at 22:03

## 2020-01-09 ASSESSMENT — ENCOUNTER SYMPTOMS
TREMORS: 0
VOMITING: 0
NAUSEA: 0
DIARRHEA: 0
FEVER: 0
CHILLS: 0
PALPITATIONS: 0
NECK PAIN: 0
DIAPHORESIS: 0
COUGH: 0
SHORTNESS OF BREATH: 0
INSOMNIA: 0
HEADACHES: 0
MYALGIAS: 1
STRIDOR: 0
ABDOMINAL PAIN: 0
DIZZINESS: 0

## 2020-01-09 NOTE — CARE PLAN
Problem: Communication  Goal: The ability to communicate needs accurately and effectively will improve  Outcome: PROGRESSING AS EXPECTED  Note:   Plan of care discussed, patient verbalizes understanding      Problem: Venous Thromboembolism (VTW)/Deep Vein Thrombosis (DVT) Prevention:  Goal: Patient will participate in Venous Thrombosis (VTE)/Deep Vein Thrombosis (DVT)Prevention Measures  Outcome: PROGRESSING AS EXPECTED  Flowsheets (Taken 1/9/2020 5117)  SCDs, Sequential Compression Device: On     Problem: Safety  Goal: Will remain free from injury  Outcome: PROGRESSING AS EXPECTED  Note:   Treaded socks in place, call light within reach, bed locked in low position, room near nursing station, hourly rounding      Problem: Respiratory:  Goal: Respiratory status will improve  Outcome: PROGRESSING AS EXPECTED  Note:   Pt is on 2.5LO2 which is herb baseline at home, saturation WNL

## 2020-01-09 NOTE — PROGRESS NOTES
Clarified with patient her wishes to be bloodless. Charge RN notified to assist with paperwork. Dr. Callaway notified

## 2020-01-09 NOTE — ANESTHESIA PREPROCEDURE EVALUATION
" 89 yo female for left hip hemiarthroplasty  Lives in assisted care  Very inactive  LBBB and PAC  H/o hypotension SBP 90  \"Does not like anesthesia\"  Reactive airway  ON LOVENOX  Recent fall fractured humerus and hip  Has seizures- the last was 5 years ago.  Often gets seizures after surgery. Received gabapentin and trileptal this morning and depakote at 10pm last night.    Discussed risks related to anesthesia with grand-daughter,  and patient. Explained we must life the DNR for the juan-operative period.    Reveiwed the EKG with Dr. Callaway.  The first EKG showed anterior MI. I had them repeat the EKG because it looked like erroneous lead placement. The repeat EKG was similar to that from July 2019, with no acute changes.    Relevant Problems   NEURO   (+) Seizure disorder (HCC)      CARDIAC   (+) Essential hypertension   (+) Left bundle branch block   (+) PAC (premature atrial contraction)      GI   (+) Esophageal reflux      Other   (+) Abnormal myocardial perfusion study   (+) Atypical chest pain   (+) Epiphora due to insufficient drainage of left side   (+) Hip fracture (HCC)   (+) Humerus fracture   (+) Hypokalemia   (+) Idiopathic hypotension   (+) Metabolic acidosis   (+) Mixed hyperlipidemia   (+) Osteoarthritis   (+) Peripheral neuropathy   (+) Subjective visual disturbance   (+) Thrombocytopenia (HCC)   (+) Vertigo       Physical Exam    Airway   Mallampati: II  TM distance: >3 FB  Neck ROM: full       Cardiovascular - normal exam  Rhythm: regular  Rate: normal  (-) murmur     Dental    Pulmonary - normal exam  Breath sounds clear to auscultation     Abdominal    Neurological - abnormal exam                 Anesthesia Plan    ASA 4 (LBBB, poor cardiac reserve)   ASA physical status 4 criteria: other (comment)    Plan - general       Airway plan will be LMA      Plan Factors:   Patient did not smoke on day of procedure.      Induction: intravenous    Postoperative Plan: Postoperative administration " of opioids is intended.    Pertinent diagnostic labs and testing reviewed    Informed Consent:    Anesthetic plan and risks discussed with patient.    Use of blood products discussed with: patient whom consented to blood products.

## 2020-01-09 NOTE — PROGRESS NOTES
[facility] Medicine Daily Progress Note    Date of Service  1/9/2020    Chief Complaint  89 y.o. female admitted 1/8/2020 with pain after a fall    Hospital Course    This is an 89 yo female who fell on her left side and sustained a left humerus fracture and a left hip fracture. Orthopedic surgery is consulted and Dr. Rice recommends left hip surgical repair and no surgery for the left arm fracture.      Interval Problem Update  The patient continues to have severe pain worse with any  movement    Consultants/Specialty  Orthopedic surgery dr. Rice    Code Status  DNR    Disposition  snf    Review of Systems  Review of Systems   Constitutional: Negative for chills, diaphoresis, fever and malaise/fatigue.   HENT: Negative for congestion.    Respiratory: Negative for cough, shortness of breath and stridor.    Cardiovascular: Negative for chest pain, palpitations and leg swelling.   Gastrointestinal: Negative for abdominal pain, diarrhea, nausea and vomiting.   Genitourinary: Negative for dysuria and urgency.   Musculoskeletal: Positive for joint pain and myalgias. Negative for neck pain.        Left shoulder and hip pain   Skin: Negative for itching and rash.   Neurological: Negative for dizziness, tremors and headaches.   Psychiatric/Behavioral: The patient does not have insomnia.         Physical Exam  Temp:  [36.2 °C (97.2 °F)-36.8 °C (98.2 °F)] 36.7 °C (98 °F)  Pulse:  [] 100  Resp:  [16-20] 16  BP: (101-146)/(47-81) 101/47  SpO2:  [91 %-100 %] 91 %    Physical Exam  Vitals signs and nursing note reviewed.   Constitutional:       Appearance: She is not ill-appearing or diaphoretic.   HENT:      Head:      Comments: Laceration over left eyebrown, bruising and swelling of left eyelids     Nose: Nose normal. No congestion.      Mouth/Throat:      Mouth: Mucous membranes are moist.      Pharynx: Oropharynx is clear. No oropharyngeal exudate.   Eyes:      General: No scleral icterus.     Conjunctiva/sclera:  Conjunctivae normal.      Pupils: Pupils are equal, round, and reactive to light.   Neck:      Musculoskeletal: Neck supple.   Cardiovascular:      Rate and Rhythm: Normal rate and regular rhythm.      Pulses: Normal pulses.      Heart sounds: Heart sounds are distant. No murmur.   Pulmonary:      Effort: No respiratory distress.      Breath sounds: Normal breath sounds. No stridor. No wheezing or rhonchi.   Abdominal:      General: There is no distension.      Palpations: Abdomen is soft.      Tenderness: There is no tenderness.   Musculoskeletal:         General: Swelling, tenderness and signs of injury present.   Lymphadenopathy:      Cervical: No cervical adenopathy.   Skin:     General: Skin is dry.      Coloration: Skin is not jaundiced.      Findings: Bruising present.      Comments: Bruise over left arm and left hip   Neurological:      Mental Status: She is alert and oriented to person, place, and time.      Coordination: Coordination abnormal.   Psychiatric:         Mood and Affect: Mood normal.         Thought Content: Thought content normal.         Fluids    Intake/Output Summary (Last 24 hours) at 1/9/2020 1026  Last data filed at 1/8/2020 2215  Gross per 24 hour   Intake 0 ml   Output 200 ml   Net -200 ml       Laboratory  Recent Labs     01/08/20  1614 01/09/20  0445   WBC 7.3 12.9*   RBC 4.28 3.68*   HEMOGLOBIN 13.7 11.8*   HEMATOCRIT 40.5 35.7*   MCV 94.6 97.0   MCH 32.0 32.1   MCHC 33.8 33.1*   RDW 44.3 45.6   PLATELETCT 109* 118*   MPV 9.6 9.9     Recent Labs     01/08/20  1614 01/09/20  0445   SODIUM 136 136   POTASSIUM 3.5* 4.9   CHLORIDE 106 104   CO2 18* 17*   GLUCOSE 103* 115*   BUN 12 13   CREATININE 0.58 0.58   CALCIUM 7.9* 8.9     Recent Labs     01/08/20  1614   APTT 29.5   INR 1.09               Imaging  DX-HUMERUS 2+ LEFT   Final Result      Comminuted impacted left humeral neck fracture.      DX-SHOULDER 2+ LEFT   Final Result      Comminuted mildly angulated and impacted left humeral  neck fracture.      DX-CHEST-LIMITED (1 VIEW)   Final Result      No acute cardiopulmonary abnormality.      DX-FEMUR-2+ LEFT   Final Result      1.  Acute left transcervical/basicervical femoral neck fracture.   2.  Osteopenia.      DX-PELVIS-1 OR 2 VIEWS   Final Result      1.  Acute left transcervical/basicervical femoral neck fracture.   2.  Osteopenia.      CT-HEAD W/O   Final Result      1.  Chronic ischemic changes.   2.  No acute intracranial abnormality.           Assessment/Plan  * Hip fracture (HCC)  Assessment & Plan  Pain control  Ortho consulted and surgery planned for today      Metabolic acidosis  Assessment & Plan  Fluids and monitor labs    Humerus fracture  Assessment & Plan  Pain control and nonsurgical per orthopedic surgery    Idiopathic hypotension- (present on admission)  Assessment & Plan  midodrine    Seizure disorder (HCC)- (present on admission)  Assessment & Plan  Controlled on depakote, trileptal and neurontin, will continue meds    Left bundle branch block- (present on admission)  Assessment & Plan  chronic    Vertigo- (present on admission)  Assessment & Plan  Treat as needed         VTE prophylaxis: lovenox after surgery

## 2020-01-09 NOTE — ED NOTES
Pt medicated with IV morphine 2mg. Pt states pain all over L side. Family at bedside. POC discussed with pt and family

## 2020-01-09 NOTE — ED PROVIDER NOTES
ED Provider Note    CHIEF COMPLAINT  Chief Complaint   Patient presents with   • Fall   • Shoulder Pain     left   • Hip Pain     left    • Laceration       HPI  Judit Ramsey is a 89 y.o. female who presents after a fall.  Patient had a ground-level fall at [facility] today.  She apparently wears baseline oxygen and she did not have oxygen in use when EMS responded.  She has left shoulder and left hip pain as well as a laceration on her face.  She denies any blood thinner use.      REVIEW OF SYSTEMS  positive for left shoulder left hip and head laceration, negative for loss of consciousness. All other systems are negative.     PAST MEDICAL HISTORY   has a past medical history of Anesthesia, Arthritis, Asthma, Breath shortness, Cancer ([facility]), Cataract, CHEST PAIN (5/3/2011), CHF (congestive heart failure) ([facility]) (8/3/2011), Esophageal reflux (3/19/2008), Fatigue (5/3/2011), Heart burn, Hiatus hernia syndrome, HLD (hyperlipidemia) (3/29/2011), Hypertension, Incontinence, Left bundle branch block (3/29/2011), Osteoarthritis (3/19/2008), Pneumonia, Seizure ([facility]) (9/2/2008), Subjective visual disturbance, unspecified (5/3/2011), Unspecified vertiginous syndromes and labyrinthine disorders (3/19/2008), and Urinary bladder disorder.    SOCIAL HISTORY  Social History     Tobacco Use   • Smoking status: Never Smoker   • Smokeless tobacco: Never Used   Substance and Sexual Activity   • Alcohol use: No   • Drug use: No   • Sexual activity: Not on file       SURGICAL HISTORY   has a past surgical history that includes general lung surgery; cholecystectomy; knee replacement, total (2010); abdominal hysterectomy total; and ectropian repair (Left, 8/3/2016).    CURRENT MEDICATIONS  Home Medications    **Home medications have not yet been reviewed for this encounter**         ALLERGIES  Allergies   Allergen Reactions   • Ciprofloxacin      SEIZURES.   • Prednisone Unspecified     dizziness       PHYSICAL EXAM  VITAL SIGNS: BP  "131/72   Pulse 97   Temp 36.2 °C (97.2 °F) (Temporal)   Resp 20   Ht 1.702 m (5' 7\")   Wt 81 kg (178 lb 9.2 oz)   LMP  (LMP Unknown)   SpO2 100%   BMI 27.97 kg/m² .  Constitutional: Alert in no apparent distress.  HENT: 4 cm laceration on the left eyebrow, Bilateral external ears normal, Nose normal.   Eyes: Pupils are equal and reactive, Conjunctiva normal, Non-icteric.   Neck: Normal range of motion, No tenderness, Supple, No stridor.   Cardiovascular: Regular rate and rhythm, no murmurs.   Thorax & Lungs: Normal breath sounds, No respiratory distress, No wheezing, No chest tenderness.   Abdomen: Bowel sounds normal, Soft, No tenderness, No masses, No peritoneal signs.  Skin: Warm, Dry, No erythema, No rash.   Musculoskeletal: Left shoulder with significant swelling and an anterior humeral head, left hip externally rotated and slightly foreshortened foot intact pulses intact sensation  Neurologic: Alert,  No focal deficits noted.   Psychiatric: Affect normal, Judgment normal, Mood normal.       DIAGNOSTIC STUDIES / PROCEDURES        LABS  Labs Reviewed   CBC WITH DIFFERENTIAL - Abnormal; Notable for the following components:       Result Value    Platelet Count 109 (*)     Immature Granulocytes 1.80 (*)     Immature Granulocytes (abs) 0.13 (*)     All other components within normal limits    Narrative:     Indicate which anticoagulants the patient is on:->UNKNOWN   COMP METABOLIC PANEL - Abnormal; Notable for the following components:    Potassium 3.5 (*)     Co2 18 (*)     Anion Gap 12.0 (*)     Glucose 103 (*)     Calcium 7.9 (*)     AST(SGOT) 10 (*)     Total Protein 5.3 (*)     Globulin 1.8 (*)     All other components within normal limits    Narrative:     Indicate which anticoagulants the patient is on:->UNKNOWN   APTT    Narrative:     Indicate which anticoagulants the patient is on:->UNKNOWN   PROTHROMBIN TIME    Narrative:     Indicate which anticoagulants the patient is on:->UNKNOWN   ESTIMATED " GFR    Narrative:     Indicate which anticoagulants the patient is on:->UNKNOWN       RADIOLOGY  DX-HUMERUS 2+ LEFT   Final Result      Comminuted impacted left humeral neck fracture.      DX-SHOULDER 2+ LEFT   Final Result      Comminuted mildly angulated and impacted left humeral neck fracture.      DX-CHEST-LIMITED (1 VIEW)   Final Result      No acute cardiopulmonary abnormality.      DX-FEMUR-2+ LEFT   Final Result      1.  Acute left transcervical/basicervical femoral neck fracture.   2.  Osteopenia.      DX-PELVIS-1 OR 2 VIEWS   Final Result      1.  Acute left transcervical/basicervical femoral neck fracture.   2.  Osteopenia.      CT-HEAD W/O   Final Result      1.  Chronic ischemic changes.   2.  No acute intracranial abnormality.        Laceration Repair Procedure    Indication: Laceration    Location/Description: 4 to 5 cm T-shaped laceration over her left eye in the eyebrow    Procedure: The patient was placed in the appropriate position and anesthesia around the laceration was obtained by infiltration using 2% Lidocaine with epinephrine. The area was then irrigated with normal saline. The laceration was closed with 5-0 fast-absorbing gut. There were no additional lacerations requiring repair.    Total repaired wound length: 5 cm.     Other Items: Suture count: 8    The patient tolerated the procedure well.    Complications: None        COURSE & MEDICAL DECISION MAKING  Pertinent Labs & Imaging studies reviewed. (See chart for details)    This is an 89-year-old female that presents with a fall.  She is a laceration on her left eyebrow as well as swelling of her left shoulder and likely deformity of her left hip.  X-rays do show a proximal humerus fracture as well as a left femoral neck fracture.    The patient's laceration was repaired with 5-0 fast-absorbing gut sutures which are dissolvable.  Head CT is negative for intracranial bleed.  I did speak with Dr. Rice, orthopedics who will consult and  set her up for surgery tomorrow.    I spoke with Dr. Bryant, hospitalist who will consult for hospitalization.  Patient will be hospitalized with the hospitalist service in guarded condition.      FINAL IMPRESSION  1. Closed fracture of left hip, initial encounter ([facility])     2. Closed displaced fracture of surgical neck of left humerus, unspecified fracture morphology, initial encounter     3. Facial laceration, initial encounter         2.   3.    This dictation has been creating using voice recognition software. The accuracy of the dictation is limited the abilities of the software.  I expect there may be some errors of grammar and possibly content. I made every attempt to manually correct the errors within my dictation. However errors related to this voice recognition software may still exist and should be interpreted within the appropriate context.      The note accurately reflects work and decisions made by me.  Lela Andujar  1/8/2020  8:36 PM

## 2020-01-09 NOTE — PROGRESS NOTES
2 RN skin check. Pt has large bruise on L hip. Bruising to L eye with small laceration. Applied dry gauze to laceration. Skin is otherwise intact.

## 2020-01-09 NOTE — ED NOTES
Pt had BM in depends. Pt cleaned up. Laceration cleaned up on L forehead. Almazan placed per order

## 2020-01-09 NOTE — FLOWSHEET NOTE
This note also relates to the following rows which could not be included:  Pulse Oximetry - Cannot attach notes to unvalidated device data       01/08/20 0546   Events/Summary/Plan   Events/Summary/Plan MDI x 2 puffs f/w rinsing of mouth   Non-Invasive Resp Device Site Inspection Completed Intact   Interdisciplinary Plan of Care-Goals (Indications)   Bronchodilator Indications History / Diagnosis   Interdisciplinary Plan of Care-Outcomes    Bronchodilator Outcome Patient at Stable Baseline   Education   Education Yes - Pt. / Family has been Instructed in use of Respiratory Medications and Adverse Reactions   MDI/DPI Group   #MDI/DPI Given MDI/DPI x 1   Chest Exam   Respiration 18   Pulse (!) 110   Breath Sounds   RUL Breath Sounds Clear;Diminished   RML Breath Sounds Diminished   RLL Breath Sounds Diminished   BRAYDEN Breath Sounds Clear;Diminished   LLL Breath Sounds Diminished   Oximetry   #Pulse Oximetry (Single Determination) Yes   Oxygen   Home O2 Use Prior To Admission? Yes   Home O2 LPM Flow 2.5 LPM   Home O2 Delivery Method Nasal Cannula   Home O2 Frequency of Use Continuous   O2 (LPM) 2.5   O2 Daily Delivery Respiratory  Nasal Cannula

## 2020-01-09 NOTE — OP REPORT
Date of Service: 01/09/20    Diagnosis: left proximal humerus fracture    Treatment/Procedure: Closed treatment without manipulation of left proximal humerus fracture    Surgeon: Izaiah Rice    Treatment plan: We discussed the above injury and recommended nonoperative treatment.  This would include therapy in the hospital for range of motion and ADLs.  Passive and active assisted range of motion and non weight bearing to the injured extremity.  We will repeat xrays and check on functional status in the office in 2 weeks.

## 2020-01-09 NOTE — PROGRESS NOTES
Pt alert and oriented, groggy  C/o pain to left hip, ice pack applied  Bruising to L hip and L eye, L arm in sling  Turned and repositioned with pillows, unable to fully turn for back assessment at this time  IVf infusing, NPO pending surgery at 1430  SCDs on, safety precautions in place

## 2020-01-09 NOTE — PROGRESS NOTES
Pt to surgery with transport. IV saline locked. 2.5LO2. Family with patient. Preop RN previously notified of pt's bloodless status, wristband on

## 2020-01-09 NOTE — ASSESSMENT & PLAN NOTE
Pain control    Passive and active assisted range of motion and non weight bearing to the injured extremity.   Will need repeat xray and follow up with Dr. Rice in two weeks should she not dc on hospice.

## 2020-01-09 NOTE — CONSULTS
"Date of Service: 01/09/20    CC: Left hip and left shoulder fractures    HPI: Judit Ramsey is a 89 y.o. female who presents with complaints of pain to left hip and left shoulder.  This started yesterday after a ground-level fall.  The pain is 6/10 and is described as sharp.  The pain is made worse by palpation of the area and made better by rest and immobilization.    She had a previous fall and right femoral neck fracture that was treated with hemiarthroplasty by Dr. Anderson in.  She is currently residing in a assisted living/skilled nursing environment.    She denies any history of blood clots.    PMH:   Past Medical History:   Diagnosis Date   • Anesthesia     Pt and spouse state, anesthesia causes her seizures, she has had several seizures at least 4 in PACU, following several different surgeries.  She has had colonoscopies and cataract surgery and did fine.   • Arthritis     bilateral knees   • Asthma    • Breath shortness     02 @ 2L con't   • Cancer ([facility])     skin cancer   • Cataract     bilateral IOL   • CHEST PAIN 5/3/2011   • CHF (congestive heart failure) ([facility]) 8/3/2011   • Esophageal reflux 3/19/2008   • Fatigue 5/3/2011   • Heart burn     \"chronic pain in my stomach\"   • Hiatus hernia syndrome    • HLD (hyperlipidemia) 3/29/2011   • Hypertension    • Incontinence    • Left bundle branch block 3/29/2011   • Osteoarthritis 3/19/2008   • Pneumonia     2010   • Seizure ([facility]) 9/2/2008    Pt states some anesthesia causes seizures, last seizure 4 years ago   • Subjective visual disturbance, unspecified 5/3/2011   • Unspecified vertiginous syndromes and labyrinthine disorders 3/19/2008   • Urinary bladder disorder        PSH:   Past Surgical History:   Procedure Laterality Date   • ECTROPIAN REPAIR Left 8/3/2016    Procedure: ECTROPIAN REPAIR - LOWER LID WITH LATERAL TARSAL STRIP;  Surgeon: Daniel Pimentel M.D.;  Location: SURGERY SURGICAL [facility] ORS;  Service:    • KNEE REPLACEMENT, TOTAL  2010    " "BILATERAL   • ABDOMINAL HYSTERECTOMY TOTAL     • CHOLECYSTECTOMY     • GENERAL LUNG SURGERY      LUNG SEGMENTAL RESECTION.       FH:   Family History   Problem Relation Age of Onset   • Stroke Mother    • Heart Disease Father        SH:   Social History     Socioeconomic History   • Marital status:      Spouse name: Not on file   • Number of children: Not on file   • Years of education: Not on file   • Highest education level: Not on file   Occupational History   • Not on file   Social Needs   • Financial resource strain: Not on file   • Food insecurity:     Worry: Patient refused     Inability: Patient refused   • Transportation needs:     Medical: Patient refused     Non-medical: Patient refused   Tobacco Use   • Smoking status: Never Smoker   • Smokeless tobacco: Never Used   Substance and Sexual Activity   • Alcohol use: No   • Drug use: No   • Sexual activity: Not on file   Lifestyle   • Physical activity:     Days per week: Not on file     Minutes per session: Not on file   • Stress: Not on file   Relationships   • Social connections:     Talks on phone: Not on file     Gets together: Not on file     Attends Zoroastrianism service: Not on file     Active member of club or organization: Not on file     Attends meetings of clubs or organizations: Not on file     Relationship status: Not on file   • Intimate partner violence:     Fear of current or ex partner: Not on file     Emotionally abused: Not on file     Physically abused: Not on file     Forced sexual activity: Not on file   Other Topics Concern   • Not on file   Social History Narrative   • Not on file       ROS: A 10 system review of systems was negative outside what was listed in the HPI    /47   Pulse 100   Temp 36.7 °C (98 °F) (Oral)   Resp 18   Ht 1.702 m (5' 7\")   Wt 81 kg (178 lb 9.2 oz)   SpO2 91%     Physical Exam:  General: Alert, NAD  HEENT: normocephalic, ecchymoses around left eye, repaired goos laceration to eyebrow  Psych: " Normal mood and affect  Neck: supple, no pain to motion  Chest/Pulmonary: breathing unlabored, no audible wheezing  Cardio: regular heart rate and rhythm  Neuro: sensation grossly intact to BUE and BLE, moving all four extremities  Skin: intact with no full thickness abrasions or lacerations, ecchymoses to lateral hip on the left  MSK: Left arm in sling.  Pain to left shoulder and left hip motion.  No tenderness at knee leg or ankle.  Right upper and right lower extremities are atraumatic and nontender to palpation    Imaging and labs: Left shoulder shows impacted proximal humerus fracture, pelvis and left hip views show a femoral neck fracture and a previous right hip hemiarthroplasty    Assessment:   1. Closed fracture of left hip, initial encounter ([facility])     2. Closed displaced fracture of surgical neck of left humerus, unspecified fracture morphology, initial encounter     3. Facial laceration, initial encounter         We discussed the diagnosis and findings with the patient at length.  We reviewed possible non operative and operative interventions and the risks and benefits of these.  I would recommend left hip hemiarthroplasty nonoperative treatment of her left shoulder.  She had a chance to ask questions and all of these were answered to her satisfaction.  We will further discuss this treatment recommendation with her family when they are available as well.        Plan:  N.p.o.  2 OR this afternoon for left hip hemiarthroplasty  Sling to left shoulder for comfort  Passive and active assistive range of motion left shoulder  Mobilize postoperatively  SCDs

## 2020-01-09 NOTE — PROGRESS NOTES
Bloodless consent signed by patient and Dr. Callaway, cuauhtemoc and jase applied. Bloodless entered as allergy. Pt ok with receiving her own blood products, not others

## 2020-01-09 NOTE — PROGRESS NOTES
2200: pt arrived on unit. Vss. Pt c/o 7/10 pain while moving. Declines intervention at this time. Positioned pt for comfort. Pt is requesting to rest at this time. Fall precautions in place.

## 2020-01-09 NOTE — ANESTHESIA PROCEDURE NOTES
Airway  Date/Time: 1/9/2020 3:08 PM  Performed by: Ephraim Ornelas M.D.  Authorized by: Ephraim Ornelas M.D.     Location:  OR  Urgency:  Elective  Difficult Airway: No    Indications for Airway Management:  Anesthesia  Spontaneous Ventilation: absent    Sedation Level:  Deep  Preoxygenated: Yes    MILS Maintained Throughout: No    Mask Difficulty Assessment:  1 - vent by mask  Final Airway Type:  Supraglottic airway  Final Supraglottic Airway:  Standard LMA  SGA Size:  3  Number of Attempts at Approach:  1

## 2020-01-09 NOTE — H&P
Hospital Medicine History & Physical Note    Date of Service  1/8/2020    Primary Care Physician  Adrianne Jacobo M.D.    Consultants  ortho- dannielle    Code Status  DNR    Chief Complaint  Hip and arm pain    History of Presenting Illness  89 y.o. female who presented 1/8/2020 with a fall at [facility], her assisted living facility. She says she stood up while she was feeling dizzy. She reached for her walker and fell over. She has known history of vertigo. She also has a history of orthostatic hypotension for which she takes midodrine. She has been feeling well otherwise recently without any acute illnesses, but has a history of recurrent falls.     I discussed her with the ER physician.       Review of Systems  Review of Systems   Constitutional: Negative for chills and fever.   HENT: Negative for sore throat.    Eyes: Negative for blurred vision and pain.   Respiratory: Negative for cough and shortness of breath.    Cardiovascular: Negative for chest pain and palpitations.   Gastrointestinal: Negative for abdominal pain, nausea and vomiting.   Genitourinary: Negative for dysuria and urgency.   Musculoskeletal: Positive for joint pain. Negative for back pain and neck pain.   Skin: Negative for itching and rash.   Neurological: Negative for dizziness, tingling and headaches.   Psychiatric/Behavioral: Negative for depression. The patient does not have insomnia.    All other systems reviewed and are negative.      Past Medical History   has a past medical history of Anesthesia, Arthritis, Asthma, Breath shortness, Cancer ([facility]), Cataract, CHEST PAIN (5/3/2011), CHF (congestive heart failure) ([facility]) (8/3/2011), Esophageal reflux (3/19/2008), Fatigue (5/3/2011), Heart burn, Hiatus hernia syndrome, HLD (hyperlipidemia) (3/29/2011), Hypertension, Incontinence, Left bundle branch block (3/29/2011), Osteoarthritis (3/19/2008), Pneumonia, Seizure ([facility]) (9/2/2008), Subjective visual disturbance, unspecified (5/3/2011),  Unspecified vertiginous syndromes and labyrinthine disorders (3/19/2008), and Urinary bladder disorder.    Surgical History   has a past surgical history that includes general lung surgery; cholecystectomy; knee replacement, total (); abdominal hysterectomy total; and ectropian repair (Left, 8/3/2016).     Family History  family history includes Heart Disease in her father; Stroke in her mother.     Social History   reports that she has never smoked. She has never used smokeless tobacco. She reports that she does not drink alcohol or use drugs.    Allergies  Allergies   Allergen Reactions   • Ciprofloxacin      SEIZURES.   • Prednisone Unspecified     dizziness       Medications  Prior to Admission Medications   Prescriptions Last Dose Informant Patient Reported? Taking?   Cholecalciferol (VITAMIN D) 2000 units Cap  MAR from Other Facility Yes No   Sig: Take 1 Cap by mouth every day.   LACTOBACILLUS PO  MAR from Other Facility Yes No   Sig: Take 1 Cap by mouth every day.   Menthol-Zinc Oxide (CALMOSEPTINE EX)  MAR from Other Facility Yes No   Si Application by Apply externally route 2 Times a Day. buttocks   Multiple Vitamins-Minerals (PRESERVISION AREDS 2 PO)  MAR from Other Facility Yes No   Sig: Take 1 Tab by mouth every day.   OXcarbazepine (TRILEPTAL) 300 MG Tab  MAR from Other Facility Yes No   Sig: Take 300 mg by mouth every day.   Trolamine Salicylate (ASPERCREME EX)   Yes No   Sig: by Apply externally route.   acetaminophen (TYLENOL) 500 MG Tab  MAR from Other Facility Yes No   Sig: Take 500 mg by mouth 3 times a day.   albuterol 108 (90 Base) MCG/ACT Aero Soln inhalation aerosol   Yes No   Sig: Inhale 2 Puffs by mouth every 6 hours as needed for Shortness of Breath.   amoxicillin (AMOXIL) 500 MG Cap   Yes No   Sig: Take 2,000 mg by mouth. Take 4 tablets 1 hour prior to dental appointment   aspirin (ASA) 81 MG Chew Tab chewable tablet  MAR from Other Facility Yes No   Sig: Take 81 mg by mouth every  day.   darifenacin (ENABLEX) 15 MG SR tablet  MAR from Other Facility Yes No   Sig: Take 15 mg by mouth every day.   divalproex (DEPAKOTE) 500 MG Tablet Delayed Response   Yes No   divalproex ER (DEPAKOTE ER) 500 MG TABLET SR 24 HR  MAR from Other Facility Yes No   Sig: Take 1,000 mg by mouth every bedtime.   docusate sodium (COLACE) 100 MG Cap   Yes No   Sig: Take 100 mg by mouth every day.   famotidine (PEPCID) 20 MG Tab  MAR from Other Facility Yes No   Sig: Take 20 mg by mouth 2 times a day.   fluticasone (FLONASE) 50 MCG/ACT nasal spray  MAR from Other Facility Yes No   Sig: Spray 2 Sprays in nose every day.   fluticasone-salmeterol (ADVAIR) 100-50 MCG/DOSE AEROSOL POWDER, BREATH ACTIVATED  MAR from Other Facility Yes No   Sig: Inhale 1 Puff by mouth every 12 hours.   gabapentin (NEURONTIN) 100 MG Cap  MAR from Other Facility Yes No   Sig: Take 100 mg by mouth every day.   meloxicam (MOBIC) 7.5 MG Tab  MAR from Other Facility Yes No   Sig: Take 7.5 mg by mouth every day.   methenamine hip (HIPPREX) 1 GM Tab  MAR from Other Facility Yes No   Sig: Take 1 g by mouth every day.   midodrine (PROAMATINE) 5 MG Tab  MAR from Other Facility Yes No   Sig: Take 5 mg by mouth 2 times a day.   nitrofurantoin monohyd macro (MACROBID) 100 MG Cap   Yes No   omeprazole (PRILOSEC) 20 MG Tablet Delayed Response delayed-release tablet   Yes No   omeprazole (PRILOSEC) 20 MG delayed-release capsule  MAR from Other Facility Yes No   Sig: Take 20 mg by mouth every day.   pentosan (ELMIRON) 100 MG Cap  MAR from Other Facility Yes No   Sig: Take 100 mg by mouth every day.   potassium chloride ER (KLOR-CON) 10 MEQ tablet   No No   Sig: Take 1 Tab by mouth 2 times a day.   potassium chloride SA (K-DUR) 10 MEQ Tab CR   Yes No   sodium chloride (SALT) 1 GM Tab  MAR from Other Facility Yes No   Sig: Take 1 g by mouth 2 Times a Day.   sulfamethoxazole-trimethoprim (BACTRIM DS) 800-160 MG tablet   Yes No   Sig: Take 1 Tab by mouth 2 times a  day.      Facility-Administered Medications: None       Physical Exam  Temp:  [36.2 °C (97.2 °F)] 36.2 °C (97.2 °F)  Pulse:  [] 97  Resp:  [20] 20  BP: (127-146)/(72-81) 131/72  SpO2:  [96 %-100 %] 100 %    Physical Exam  Vitals signs and nursing note reviewed.   Constitutional:       General: She is not in acute distress.     Appearance: She is well-developed. She is not diaphoretic.   HENT:      Right Ear: External ear normal.      Left Ear: External ear normal.      Nose: Nose normal.   Eyes:      General:         Right eye: No discharge.         Left eye: No discharge.      Conjunctiva/sclera: Conjunctivae normal.   Neck:      Vascular: No JVD.   Cardiovascular:      Rate and Rhythm: Regular rhythm.      Heart sounds: Normal heart sounds. No murmur.   Pulmonary:      Effort: Pulmonary effort is normal. No respiratory distress.      Breath sounds: Normal breath sounds. No stridor. No wheezing or rales.   Abdominal:      General: Bowel sounds are normal. There is no distension.      Palpations: Abdomen is soft.      Tenderness: There is no tenderness.   Musculoskeletal:         General: Tenderness present.   Skin:     General: Skin is warm and dry.      Findings: No erythema.   Neurological:      Mental Status: She is alert and oriented to person, place, and time.   Psychiatric:         Behavior: Behavior normal.         Laboratory:  Recent Labs     01/08/20  1614   WBC 7.3   RBC 4.28   HEMOGLOBIN 13.7   HEMATOCRIT 40.5   MCV 94.6   MCH 32.0   MCHC 33.8   RDW 44.3   PLATELETCT 109*   MPV 9.6     Recent Labs     01/08/20  1614   SODIUM 136   POTASSIUM 3.5*   CHLORIDE 106   CO2 18*   GLUCOSE 103*   BUN 12   CREATININE 0.58   CALCIUM 7.9*     Recent Labs     01/08/20  1614   ALTSGPT 7   ASTSGOT 10*   ALKPHOSPHAT 45   TBILIRUBIN 0.3   GLUCOSE 103*     Recent Labs     01/08/20  1614   APTT 29.5   INR 1.09     No results for input(s): NTPROBNP in the last 72 hours.      No results for input(s): TROPONINT in the  last 72 hours.    Urinalysis:    No results found     Imaging:  DX-HUMERUS 2+ LEFT   Final Result      Comminuted impacted left humeral neck fracture.      DX-SHOULDER 2+ LEFT   Final Result      Comminuted mildly angulated and impacted left humeral neck fracture.      DX-CHEST-LIMITED (1 VIEW)   Final Result      No acute cardiopulmonary abnormality.      DX-FEMUR-2+ LEFT   Final Result      1.  Acute left transcervical/basicervical femoral neck fracture.   2.  Osteopenia.      DX-PELVIS-1 OR 2 VIEWS   Final Result      1.  Acute left transcervical/basicervical femoral neck fracture.   2.  Osteopenia.      CT-HEAD W/O   Final Result      1.  Chronic ischemic changes.   2.  No acute intracranial abnormality.            Assessment/Plan:  I anticipate this patient will require at least two midnights for appropriate medical management, necessitating inpatient admission.    * Hip fracture (HCC)  Assessment & Plan  Pain control  Ortho consulted for repair  Dvt proph      Metabolic acidosis  Assessment & Plan  IV fluid trial.     Humerus fracture  Assessment & Plan  Pain control and ortho has been consulted.     Idiopathic hypotension- (present on admission)  Assessment & Plan  midodrine    Seizure disorder (HCC)- (present on admission)  Assessment & Plan  Continue meds. No recent activity.     Left bundle branch block- (present on admission)  Assessment & Plan  chronic    Vertigo- (present on admission)  Assessment & Plan  Prn meds.       VTE prophylaxis: scd

## 2020-01-09 NOTE — ASSESSMENT & PLAN NOTE
Pain control with oxycodone as needed  Left hip surgical repair done 1/9  Patient unable to work with PT due to hypotension  Hospice referral placed

## 2020-01-09 NOTE — ASSESSMENT & PLAN NOTE
Midodrine as patient has chronic hypotension  Worsened hypotension is due to sepsis now improved to her baseline

## 2020-01-10 LAB
ERYTHROCYTE [DISTWIDTH] IN BLOOD BY AUTOMATED COUNT: 45.9 FL (ref 35.9–50)
HCT VFR BLD AUTO: 23.2 % (ref 37–47)
HGB BLD-MCNC: 7.6 G/DL (ref 12–16)
HGB BLD-MCNC: 7.7 G/DL (ref 12–16)
MCH RBC QN AUTO: 31.7 PG (ref 27–33)
MCHC RBC AUTO-ENTMCNC: 33.2 G/DL (ref 33.6–35)
MCV RBC AUTO: 95.5 FL (ref 81.4–97.8)
PLATELET # BLD AUTO: 78 K/UL (ref 164–446)
PMV BLD AUTO: 10.4 FL (ref 9–12.9)
RBC # BLD AUTO: 2.43 M/UL (ref 4.2–5.4)
WBC # BLD AUTO: 12.4 K/UL (ref 4.8–10.8)

## 2020-01-10 PROCEDURE — 36415 COLL VENOUS BLD VENIPUNCTURE: CPT

## 2020-01-10 PROCEDURE — 700111 HCHG RX REV CODE 636 W/ 250 OVERRIDE (IP): Performed by: ORTHOPAEDIC SURGERY

## 2020-01-10 PROCEDURE — 85027 COMPLETE CBC AUTOMATED: CPT

## 2020-01-10 PROCEDURE — 700105 HCHG RX REV CODE 258: Performed by: HOSPITALIST

## 2020-01-10 PROCEDURE — 700105 HCHG RX REV CODE 258: Performed by: ORTHOPAEDIC SURGERY

## 2020-01-10 PROCEDURE — 85018 HEMOGLOBIN: CPT

## 2020-01-10 PROCEDURE — 99232 SBSQ HOSP IP/OBS MODERATE 35: CPT | Performed by: INTERNAL MEDICINE

## 2020-01-10 PROCEDURE — 770006 HCHG ROOM/CARE - MED/SURG/GYN SEMI*

## 2020-01-10 PROCEDURE — 700102 HCHG RX REV CODE 250 W/ 637 OVERRIDE(OP): Performed by: HOSPITALIST

## 2020-01-10 PROCEDURE — A9270 NON-COVERED ITEM OR SERVICE: HCPCS | Performed by: HOSPITALIST

## 2020-01-10 PROCEDURE — 700111 HCHG RX REV CODE 636 W/ 250 OVERRIDE (IP): Performed by: HOSPITALIST

## 2020-01-10 RX ADMIN — OXYCODONE HYDROCHLORIDE 2.5 MG: 5 TABLET ORAL at 07:55

## 2020-01-10 RX ADMIN — SENNOSIDES AND DOCUSATE SODIUM 2 TABLET: 8.6; 5 TABLET ORAL at 06:10

## 2020-01-10 RX ADMIN — MIDODRINE HYDROCHLORIDE 5 MG: 5 TABLET ORAL at 06:11

## 2020-01-10 RX ADMIN — FAMOTIDINE 20 MG: 20 TABLET ORAL at 18:35

## 2020-01-10 RX ADMIN — ENOXAPARIN SODIUM 40 MG: 100 INJECTION SUBCUTANEOUS at 06:13

## 2020-01-10 RX ADMIN — MIDODRINE HYDROCHLORIDE 5 MG: 5 TABLET ORAL at 18:35

## 2020-01-10 RX ADMIN — CEFAZOLIN 2 G: 10 INJECTION, POWDER, FOR SOLUTION INTRAVENOUS at 06:08

## 2020-01-10 RX ADMIN — ACETAMINOPHEN 650 MG: 325 TABLET, FILM COATED ORAL at 18:35

## 2020-01-10 RX ADMIN — SENNOSIDES AND DOCUSATE SODIUM 2 TABLET: 8.6; 5 TABLET ORAL at 18:35

## 2020-01-10 RX ADMIN — POTASSIUM CHLORIDE 10 MEQ: 20 TABLET, EXTENDED RELEASE ORAL at 06:10

## 2020-01-10 RX ADMIN — ACETAMINOPHEN 650 MG: 325 TABLET, FILM COATED ORAL at 07:55

## 2020-01-10 RX ADMIN — BUDESONIDE AND FORMOTEROL FUMARATE DIHYDRATE 2 PUFF: 160; 4.5 AEROSOL RESPIRATORY (INHALATION) at 18:35

## 2020-01-10 RX ADMIN — BUDESONIDE AND FORMOTEROL FUMARATE DIHYDRATE 2 PUFF: 160; 4.5 AEROSOL RESPIRATORY (INHALATION) at 06:09

## 2020-01-10 RX ADMIN — PENTOSAN POLYSULFATE SODIUM 100 MG: 100 CAPSULE, GELATIN COATED ORAL at 06:11

## 2020-01-10 RX ADMIN — METHENAMINE HIPPURATE 1 G: 1 TABLET ORAL at 06:11

## 2020-01-10 RX ADMIN — POTASSIUM CHLORIDE 10 MEQ: 20 TABLET, EXTENDED RELEASE ORAL at 18:35

## 2020-01-10 RX ADMIN — MELOXICAM 7.5 MG: 7.5 TABLET ORAL at 06:11

## 2020-01-10 RX ADMIN — OXCARBAZEPINE 300 MG: 300 TABLET, FILM COATED ORAL at 06:11

## 2020-01-10 RX ADMIN — SODIUM CHLORIDE, POTASSIUM CHLORIDE, SODIUM LACTATE AND CALCIUM CHLORIDE: 600; 310; 30; 20 INJECTION, SOLUTION INTRAVENOUS at 06:08

## 2020-01-10 RX ADMIN — FAMOTIDINE 20 MG: 20 TABLET ORAL at 06:10

## 2020-01-10 RX ADMIN — GABAPENTIN 100 MG: 100 CAPSULE ORAL at 06:11

## 2020-01-10 RX ADMIN — SODIUM CHLORIDE TAB 1 GM 1 G: 1 TAB at 18:35

## 2020-01-10 RX ADMIN — SODIUM CHLORIDE TAB 1 GM 1 G: 1 TAB at 06:10

## 2020-01-10 ASSESSMENT — COGNITIVE AND FUNCTIONAL STATUS - GENERAL
TOILETING: A LOT
SUGGESTED CMS G CODE MODIFIER DAILY ACTIVITY: CL
PERSONAL GROOMING: A LOT
DRESSING REGULAR LOWER BODY CLOTHING: A LOT
DAILY ACTIVITIY SCORE: 12
WALKING IN HOSPITAL ROOM: A LOT
TURNING FROM BACK TO SIDE WHILE IN FLAT BAD: A LOT
DRESSING REGULAR UPPER BODY CLOTHING: A LOT
HELP NEEDED FOR BATHING: A LOT
MOBILITY SCORE: 12
MOVING FROM LYING ON BACK TO SITTING ON SIDE OF FLAT BED: A LOT
STANDING UP FROM CHAIR USING ARMS: A LOT
SUGGESTED CMS G CODE MODIFIER MOBILITY: CL
MOVING TO AND FROM BED TO CHAIR: A LOT
CLIMB 3 TO 5 STEPS WITH RAILING: A LOT
EATING MEALS: A LOT

## 2020-01-10 ASSESSMENT — ENCOUNTER SYMPTOMS
TREMORS: 0
FEVER: 0
HEADACHES: 0
SHORTNESS OF BREATH: 0
INSOMNIA: 0
ABDOMINAL PAIN: 0
COUGH: 0
HEARTBURN: 0
MYALGIAS: 1
STRIDOR: 0
HEMOPTYSIS: 0
DIARRHEA: 0
BRUISES/BLEEDS EASILY: 1
NECK PAIN: 0
NAUSEA: 0
NERVOUS/ANXIOUS: 0

## 2020-01-10 NOTE — ANESTHESIA TIME REPORT
Anesthesia Start and Stop Event Times     Date Time Event    1/9/2020 1500 Ready for Procedure     1503 Anesthesia Start     1737 Anesthesia Stop        Responsible Staff  01/09/20    Name Role Begin End    Ephraim Ornelas M.D. Anesth 1503 1527    Deshawn Gandhi M.D. Anesth 1527 1737        Preop Diagnosis (Free Text):  Pre-op Diagnosis     left hip fracture        Preop Diagnosis (Codes):    Post op Diagnosis  Closed left hip fracture (HCC)      Premium Reason  A. 3PM - 7AM    Comments:

## 2020-01-10 NOTE — PROGRESS NOTES
2000: pt is A&Ox4. Vss. No pain or n/v while at rest. Pt drifts off to sleep mid-conversation. Dressing to LLE is clean, dry and intact. CMS is intact. Almazan is in place. Pt states she is comfortable at this time and would like to rest. Fall precautions in place.    0000: pt could only tolerate sitting at the edge of bed at this time. Will attempt to mobilize again later.

## 2020-01-10 NOTE — PROGRESS NOTES
Received report, assumed pt care. Discussed POC. Encouraged CDB and leg exercises while in bed. Pt VSS. Will cont to monitor.

## 2020-01-10 NOTE — OR NURSING
1735 Patient to recovery via cart. Placed on monitors. Oral airway remains in place. Spontaneous respirations.   1755 Oral airway discontinued.. Patient remains asleep.  1822 Patient continues to sleep.   1829 Report to Prabha FARRIS on [facility].

## 2020-01-10 NOTE — PROGRESS NOTES
Hospital Medicine Daily Progress Note    Date of Service  1/10/2020    Chief Complaint  89 y.o. female admitted 1/8/2020 with pain after a fall    Hospital Course    This is an 89 yo female who fell on her left side and sustained a left humerus fracture and a left hip fracture. Orthopedic surgery is consulted and Dr. Rice recommends left hip surgical repair and no surgery for the left arm fracture.      Interval Problem Update  Thepatient had left hip fracture repair 1/9    Consultants/Specialty  Orthopedic surgery dr. Rice    Code Status  DNR    Disposition  snf    Review of Systems  Review of Systems   Constitutional: Negative for fever and malaise/fatigue.   HENT: Negative for congestion.    Respiratory: Negative for cough, hemoptysis, shortness of breath and stridor.    Cardiovascular: Negative for chest pain and leg swelling.   Gastrointestinal: Negative for abdominal pain, diarrhea, heartburn and nausea.   Genitourinary: Negative for dysuria.   Musculoskeletal: Positive for joint pain and myalgias. Negative for neck pain.        Left shoulder and hip pain   Skin: Negative for itching and rash.   Neurological: Negative for tremors and headaches.   Endo/Heme/Allergies: Bruises/bleeds easily.   Psychiatric/Behavioral: The patient is not nervous/anxious and does not have insomnia.         Physical Exam  Temp:  [36.6 °C (97.9 °F)-37.6 °C (99.7 °F)] 36.6 °C (97.9 °F)  Pulse:  [] 98  Resp:  [16-18] 18  BP: ()/(42-57) 93/42  SpO2:  [96 %-100 %] 97 %    Physical Exam  Vitals signs and nursing note reviewed.   Constitutional:       Appearance: She is not ill-appearing or diaphoretic.   HENT:      Head:      Comments: Laceration over left eyebrown, bruising and swelling of left eyelids     Nose: Nose normal. No congestion.      Mouth/Throat:      Mouth: Mucous membranes are moist.      Pharynx: Oropharynx is clear. No oropharyngeal exudate.   Eyes:      General: No scleral icterus.      Conjunctiva/sclera: Conjunctivae normal.      Pupils: Pupils are equal, round, and reactive to light.   Neck:      Musculoskeletal: Neck supple.   Cardiovascular:      Rate and Rhythm: Normal rate and regular rhythm.      Pulses: Normal pulses.      Heart sounds: Heart sounds are distant. No murmur.   Pulmonary:      Effort: No respiratory distress.      Breath sounds: Normal breath sounds. No stridor. No wheezing or rhonchi.   Abdominal:      General: There is no distension.      Palpations: Abdomen is soft.      Tenderness: There is no tenderness.   Musculoskeletal:         General: Swelling, tenderness and signs of injury present.   Lymphadenopathy:      Cervical: No cervical adenopathy.   Skin:     General: Skin is dry.      Coloration: Skin is not jaundiced.      Findings: Bruising present.      Comments: Bruise over left arm and left hip   Neurological:      Mental Status: She is alert and oriented to person, place, and time.      Coordination: Coordination abnormal.   Psychiatric:         Mood and Affect: Mood normal.         Thought Content: Thought content normal.         Fluids    Intake/Output Summary (Last 24 hours) at 1/10/2020 1111  Last data filed at 1/10/2020 0400  Gross per 24 hour   Intake 2660 ml   Output 1900 ml   Net 760 ml       Laboratory  Recent Labs     01/08/20  1614 01/09/20  0445 01/10/20  0836   WBC 7.3 12.9* 12.4*   RBC 4.28 3.68* 2.43*   HEMOGLOBIN 13.7 11.8* 7.7*   HEMATOCRIT 40.5 35.7* 23.2*   MCV 94.6 97.0 95.5   MCH 32.0 32.1 31.7   MCHC 33.8 33.1* 33.2*   RDW 44.3 45.6 45.9   PLATELETCT 109* 118* 78*   MPV 9.6 9.9 10.4     Recent Labs     01/08/20  1614 01/09/20  0445   SODIUM 136 136   POTASSIUM 3.5* 4.9   CHLORIDE 106 104   CO2 18* 17*   GLUCOSE 103* 115*   BUN 12 13   CREATININE 0.58 0.58   CALCIUM 7.9* 8.9     Recent Labs     01/08/20  1614   APTT 29.5   INR 1.09               Imaging  DX-HIP-UNILATERAL-WITH PELVIS-1 VIEW LEFT   Final Result      Status post left hip  hemiarthroplasty.      DX-HUMERUS 2+ LEFT   Final Result      Comminuted impacted left humeral neck fracture.      DX-SHOULDER 2+ LEFT   Final Result      Comminuted mildly angulated and impacted left humeral neck fracture.      DX-CHEST-LIMITED (1 VIEW)   Final Result      No acute cardiopulmonary abnormality.      DX-FEMUR-2+ LEFT   Final Result      1.  Acute left transcervical/basicervical femoral neck fracture.   2.  Osteopenia.      DX-PELVIS-1 OR 2 VIEWS   Final Result      1.  Acute left transcervical/basicervical femoral neck fracture.   2.  Osteopenia.      CT-HEAD W/O   Final Result      1.  Chronic ischemic changes.   2.  No acute intracranial abnormality.           Assessment/Plan  * Hip fracture (HCC)  Assessment & Plan  Pain control  Left hip surgical repair done 1/9      Metabolic acidosis  Assessment & Plan  Fluids given    Humerus fracture  Assessment & Plan  Pain control    Passive and active assisted range of motion and non weight bearing to the injured extremity.   Will need repeat xray and follow up with Dr. Rice in two weeks    Idiopathic hypotension- (present on admission)  Assessment & Plan  midodrine    Seizure disorder (HCC)- (present on admission)  Assessment & Plan  Controlled on depakote, trileptal and neurontin, will continue meds    Left bundle branch block- (present on admission)  Assessment & Plan  Chronic and unchanged on ekgs    Vertigo- (present on admission)  Assessment & Plan  Treat as needed       VTE prophylaxis: lovenox

## 2020-01-10 NOTE — ANESTHESIA QCDR
2019 [facility] Clinical Data Registry (for Quality Improvement)     Postoperative nausea/vomiting risk protocol (Adult = 18 yrs and Pediatric 3-17 yrs)- (430 and 463)  General inhalation anesthetic (NOT TIVA) with PONV risk factors: Yes  Provision of anti-emetic therapy with at least 2 different classes of agents: Yes   Patient DID NOT receive anti-emetic therapy and reason is documented in Medical Record:  N/A    Multimodal Pain Management- (477)  Non-emergent surgery AND patient age >= 18: No  Use of Multimodal Pain Management, two or more drugs and/or interventions, NOT including systemic opioids:   Exception: Documented allergy to multiple classes of analgesics:     Smoking Abstinence (404)  Patient is current smoker (cigarette, pipe, e-cig, marijuanna): No  Elective Surgery:   Abstinence instructions provided prior to day of surgery:   Patient abstained from smoking on day of surgery:     Pre-Op Beta-Blocker in Isolated CABG (44)  Isolated CABG AND patient age >= 18: No  Beta-blocker admin within 24 hours of surgical incision:   Exception:of medical reason(s) for not administering beta blocker within 24 hours prior to surgical incision (e.g., not  indicated,other medical reason):     PACU assessment of acute postoperative pain prior to Anesthesia Care End- Applies to Patients Age = 18- (ABG7)  Initial PACU pain score is which of the following: < 7/10  Patient unable to report pain score: N/A    Post-anesthetic transfer of care checklist/protocol to PACU/ICU- (426 and 427)  Upon conclusion of case, patient transferred to which of the following locations: PACU/Non-ICU  Use of transfer checklist/protocol: Yes  Exclusion: Service Performed in Patient Hospital Room (and thus did not require transfer): N/A  Unplanned admission to ICU related to anesthesia service up through end of PACU care- (MD51)  Unplanned admission to ICU (not initially anticipated at anesthesia start time): No

## 2020-01-10 NOTE — ANESTHESIA POSTPROCEDURE EVALUATION
Patient: Judit Ramsey    Procedure Summary     Date:  01/09/20 Room / Location:   OR 02 / SURGERY [facility]    Anesthesia Start:  1503 Anesthesia Stop:  1737    Procedure:  HEMIARTHROPLASTY, HIP with FEMUR CABLES (Left Hip) Diagnosis:  (left hip fracture)    Surgeon:  Izaiah Rice M.D. Responsible Provider:  Deshawn Gandhi M.D.    Anesthesia Type:  general ASA Status:  4          Final Anesthesia Type: general  Last vitals  BP   Blood Pressure : (!) 98/53    Temp   37.6 °C (99.7 °F)    Pulse   Pulse: (!) 104   Resp   16    SpO2   97 %      Anesthesia Post Evaluation    Patient location during evaluation: PACU  Patient participation: complete - patient participated  Level of consciousness: awake and alert    Airway patency: patent  Anesthetic complications: no  Cardiovascular status: hemodynamically stable  Respiratory status: acceptable  Hydration status: euvolemic    PONV: none           Nurse Pain Score: 0 (NPRS)

## 2020-01-10 NOTE — DISCHARGE PLANNING
Met patient and family at bedside to discuss DC planning. Family would like to research SNF options before selecting. Will round back on family later.

## 2020-01-10 NOTE — PROGRESS NOTES
Pt to floor at 1840. Arouses to voice. Denies pain. Post op vitals in place. 3LO2, VSS. Safety precautions in place

## 2020-01-10 NOTE — THERAPY
PT order received; Pt is currently lethargic and demonstrating with low BP at this time, will re-attempt PT evaluation once more appropriate.

## 2020-01-11 ENCOUNTER — APPOINTMENT (OUTPATIENT)
Dept: RADIOLOGY | Facility: MEDICAL CENTER | Age: 85
DRG: 469 | End: 2020-01-11
Attending: INTERNAL MEDICINE
Payer: MEDICARE

## 2020-01-11 PROBLEM — D62 ACUTE BLOOD LOSS AS CAUSE OF POSTOPERATIVE ANEMIA: Status: ACTIVE | Noted: 2020-01-11

## 2020-01-11 PROBLEM — J96.01 ACUTE RESPIRATORY FAILURE WITH HYPOXIA (HCC): Status: ACTIVE | Noted: 2020-01-11

## 2020-01-11 LAB
ABO GROUP BLD: NORMAL
ALBUMIN SERPL BCP-MCNC: 3 G/DL (ref 3.2–4.9)
ALBUMIN/GLOB SERPL: 1.2 G/DL
ALP SERPL-CCNC: 41 U/L (ref 30–99)
ALT SERPL-CCNC: 10 U/L (ref 2–50)
ANION GAP SERPL CALC-SCNC: 11 MMOL/L (ref 0–11.9)
ANION GAP SERPL CALC-SCNC: 18 MMOL/L (ref 0–11.9)
AST SERPL-CCNC: 37 U/L (ref 12–45)
BARCODED ABORH UBTYP: 5100
BARCODED PRD CODE UBPRD: NORMAL
BARCODED UNIT NUM UBUNT: NORMAL
BASOPHILS # BLD AUTO: 0.2 % (ref 0–1.8)
BASOPHILS # BLD AUTO: 0.2 % (ref 0–1.8)
BASOPHILS # BLD: 0.02 K/UL (ref 0–0.12)
BASOPHILS # BLD: 0.03 K/UL (ref 0–0.12)
BILIRUB SERPL-MCNC: 0.8 MG/DL (ref 0.1–1.5)
BLD GP AB SCN SERPL QL: NORMAL
BUN SERPL-MCNC: 19 MG/DL (ref 8–22)
BUN SERPL-MCNC: 22 MG/DL (ref 8–22)
CALCIUM SERPL-MCNC: 8 MG/DL (ref 8.4–10.2)
CALCIUM SERPL-MCNC: 8.4 MG/DL (ref 8.4–10.2)
CHLORIDE SERPL-SCNC: 101 MMOL/L (ref 96–112)
CHLORIDE SERPL-SCNC: 105 MMOL/L (ref 96–112)
CO2 SERPL-SCNC: 20 MMOL/L (ref 20–33)
CO2 SERPL-SCNC: 20 MMOL/L (ref 20–33)
COMPONENT R 8504R: NORMAL
CREAT SERPL-MCNC: 0.53 MG/DL (ref 0.5–1.4)
CREAT SERPL-MCNC: 0.57 MG/DL (ref 0.5–1.4)
EOSINOPHIL # BLD AUTO: 0.01 K/UL (ref 0–0.51)
EOSINOPHIL # BLD AUTO: 0.02 K/UL (ref 0–0.51)
EOSINOPHIL NFR BLD: 0.1 % (ref 0–6.9)
EOSINOPHIL NFR BLD: 0.2 % (ref 0–6.9)
ERYTHROCYTE [DISTWIDTH] IN BLOOD BY AUTOMATED COUNT: 46.4 FL (ref 35.9–50)
ERYTHROCYTE [DISTWIDTH] IN BLOOD BY AUTOMATED COUNT: 50 FL (ref 35.9–50)
GLOBULIN SER CALC-MCNC: 2.5 G/DL (ref 1.9–3.5)
GLUCOSE SERPL-MCNC: 125 MG/DL (ref 65–99)
GLUCOSE SERPL-MCNC: 160 MG/DL (ref 65–99)
HCT VFR BLD AUTO: 21.2 % (ref 37–47)
HCT VFR BLD AUTO: 26 % (ref 37–47)
HGB BLD-MCNC: 7 G/DL (ref 12–16)
HGB BLD-MCNC: 8.9 G/DL (ref 12–16)
IMM GRANULOCYTES # BLD AUTO: 0.07 K/UL (ref 0–0.11)
IMM GRANULOCYTES # BLD AUTO: 0.15 K/UL (ref 0–0.11)
IMM GRANULOCYTES NFR BLD AUTO: 0.8 % (ref 0–0.9)
IMM GRANULOCYTES NFR BLD AUTO: 1.1 % (ref 0–0.9)
LYMPHOCYTES # BLD AUTO: 1.06 K/UL (ref 1–4.8)
LYMPHOCYTES # BLD AUTO: 1.31 K/UL (ref 1–4.8)
LYMPHOCYTES NFR BLD: 14.1 % (ref 22–41)
LYMPHOCYTES NFR BLD: 7.5 % (ref 22–41)
MCH RBC QN AUTO: 31.8 PG (ref 27–33)
MCH RBC QN AUTO: 32.6 PG (ref 27–33)
MCHC RBC AUTO-ENTMCNC: 33 G/DL (ref 33.6–35)
MCHC RBC AUTO-ENTMCNC: 34.2 G/DL (ref 33.6–35)
MCV RBC AUTO: 95.2 FL (ref 81.4–97.8)
MCV RBC AUTO: 96.4 FL (ref 81.4–97.8)
MONOCYTES # BLD AUTO: 0.75 K/UL (ref 0–0.85)
MONOCYTES # BLD AUTO: 1.12 K/UL (ref 0–0.85)
MONOCYTES NFR BLD AUTO: 7.9 % (ref 0–13.4)
MONOCYTES NFR BLD AUTO: 8.1 % (ref 0–13.4)
NEUTROPHILS # BLD AUTO: 11.84 K/UL (ref 2–7.15)
NEUTROPHILS # BLD AUTO: 7.11 K/UL (ref 2–7.15)
NEUTROPHILS NFR BLD: 76.6 % (ref 44–72)
NEUTROPHILS NFR BLD: 83.2 % (ref 44–72)
NRBC # BLD AUTO: 0 K/UL
NRBC # BLD AUTO: 0.05 K/UL
NRBC BLD-RTO: 0 /100 WBC
NRBC BLD-RTO: 0.4 /100 WBC
PLATELET # BLD AUTO: 105 K/UL (ref 164–446)
PLATELET # BLD AUTO: 68 K/UL (ref 164–446)
PMV BLD AUTO: 10.4 FL (ref 9–12.9)
PMV BLD AUTO: 10.9 FL (ref 9–12.9)
POTASSIUM SERPL-SCNC: 3.6 MMOL/L (ref 3.6–5.5)
POTASSIUM SERPL-SCNC: 4 MMOL/L (ref 3.6–5.5)
PRODUCT TYPE UPROD: NORMAL
PROT SERPL-MCNC: 5.5 G/DL (ref 6–8.2)
RBC # BLD AUTO: 2.2 M/UL (ref 4.2–5.4)
RBC # BLD AUTO: 2.73 M/UL (ref 4.2–5.4)
RH BLD: NORMAL
SODIUM SERPL-SCNC: 136 MMOL/L (ref 135–145)
SODIUM SERPL-SCNC: 139 MMOL/L (ref 135–145)
UNIT STATUS USTAT: NORMAL
WBC # BLD AUTO: 14.2 K/UL (ref 4.8–10.8)
WBC # BLD AUTO: 9.3 K/UL (ref 4.8–10.8)

## 2020-01-11 PROCEDURE — 86901 BLOOD TYPING SEROLOGIC RH(D): CPT

## 2020-01-11 PROCEDURE — P9016 RBC LEUKOCYTES REDUCED: HCPCS

## 2020-01-11 PROCEDURE — 80048 BASIC METABOLIC PNL TOTAL CA: CPT

## 2020-01-11 PROCEDURE — 700111 HCHG RX REV CODE 636 W/ 250 OVERRIDE (IP): Performed by: INTERNAL MEDICINE

## 2020-01-11 PROCEDURE — 71045 X-RAY EXAM CHEST 1 VIEW: CPT

## 2020-01-11 PROCEDURE — 86923 COMPATIBILITY TEST ELECTRIC: CPT

## 2020-01-11 PROCEDURE — 30233N1 TRANSFUSION OF NONAUTOLOGOUS RED BLOOD CELLS INTO PERIPHERAL VEIN, PERCUTANEOUS APPROACH: ICD-10-PCS | Performed by: INTERNAL MEDICINE

## 2020-01-11 PROCEDURE — A9270 NON-COVERED ITEM OR SERVICE: HCPCS | Performed by: INTERNAL MEDICINE

## 2020-01-11 PROCEDURE — 86850 RBC ANTIBODY SCREEN: CPT

## 2020-01-11 PROCEDURE — 700111 HCHG RX REV CODE 636 W/ 250 OVERRIDE (IP): Performed by: HOSPITALIST

## 2020-01-11 PROCEDURE — 86900 BLOOD TYPING SEROLOGIC ABO: CPT

## 2020-01-11 PROCEDURE — 85025 COMPLETE CBC W/AUTO DIFF WBC: CPT

## 2020-01-11 PROCEDURE — 93005 ELECTROCARDIOGRAM TRACING: CPT | Performed by: INTERNAL MEDICINE

## 2020-01-11 PROCEDURE — 97163 PT EVAL HIGH COMPLEX 45 MIN: CPT

## 2020-01-11 PROCEDURE — 97166 OT EVAL MOD COMPLEX 45 MIN: CPT

## 2020-01-11 PROCEDURE — 80053 COMPREHEN METABOLIC PANEL: CPT

## 2020-01-11 PROCEDURE — 36430 TRANSFUSION BLD/BLD COMPNT: CPT

## 2020-01-11 PROCEDURE — 700102 HCHG RX REV CODE 250 W/ 637 OVERRIDE(OP): Performed by: INTERNAL MEDICINE

## 2020-01-11 PROCEDURE — 770020 HCHG ROOM/CARE - TELE (206)

## 2020-01-11 PROCEDURE — 99233 SBSQ HOSP IP/OBS HIGH 50: CPT | Performed by: INTERNAL MEDICINE

## 2020-01-11 PROCEDURE — 700102 HCHG RX REV CODE 250 W/ 637 OVERRIDE(OP): Performed by: HOSPITALIST

## 2020-01-11 PROCEDURE — A9270 NON-COVERED ITEM OR SERVICE: HCPCS | Performed by: HOSPITALIST

## 2020-01-11 RX ORDER — FUROSEMIDE 10 MG/ML
40 INJECTION INTRAMUSCULAR; INTRAVENOUS ONCE
Status: COMPLETED | OUTPATIENT
Start: 2020-01-11 | End: 2020-01-11

## 2020-01-11 RX ORDER — DILTIAZEM HYDROCHLORIDE 5 MG/ML
10 INJECTION INTRAVENOUS ONCE
Status: COMPLETED | OUTPATIENT
Start: 2020-01-11 | End: 2020-01-11

## 2020-01-11 RX ORDER — METOCLOPRAMIDE HYDROCHLORIDE 5 MG/ML
10 INJECTION INTRAMUSCULAR; INTRAVENOUS ONCE
Status: COMPLETED | OUTPATIENT
Start: 2020-01-11 | End: 2020-01-11

## 2020-01-11 RX ORDER — DIPHENHYDRAMINE HCL 25 MG
25 TABLET ORAL EVERY 6 HOURS PRN
Status: CANCELLED | OUTPATIENT
Start: 2020-01-11

## 2020-01-11 RX ORDER — FAMOTIDINE 20 MG/1
20 TABLET, FILM COATED ORAL 2 TIMES DAILY
Status: DISCONTINUED | OUTPATIENT
Start: 2020-01-11 | End: 2020-01-16 | Stop reason: HOSPADM

## 2020-01-11 RX ORDER — FUROSEMIDE 10 MG/ML
40 INJECTION INTRAMUSCULAR; INTRAVENOUS
Status: DISCONTINUED | OUTPATIENT
Start: 2020-01-11 | End: 2020-01-13

## 2020-01-11 RX ORDER — DIPHENHYDRAMINE HYDROCHLORIDE 50 MG/ML
25 INJECTION INTRAMUSCULAR; INTRAVENOUS ONCE
Status: COMPLETED | OUTPATIENT
Start: 2020-01-11 | End: 2020-01-11

## 2020-01-11 RX ADMIN — ONDANSETRON 4 MG: 4 TABLET, ORALLY DISINTEGRATING ORAL at 14:28

## 2020-01-11 RX ADMIN — DIVALPROEX SODIUM 1000 MG: 250 TABLET, FILM COATED, EXTENDED RELEASE ORAL at 20:07

## 2020-01-11 RX ADMIN — DILTIAZEM HYDROCHLORIDE 10 MG: 5 INJECTION INTRAVENOUS at 22:30

## 2020-01-11 RX ADMIN — ACETAMINOPHEN 650 MG: 325 TABLET, FILM COATED ORAL at 01:30

## 2020-01-11 RX ADMIN — METOCLOPRAMIDE 10 MG: 5 INJECTION, SOLUTION INTRAMUSCULAR; INTRAVENOUS at 22:30

## 2020-01-11 RX ADMIN — FUROSEMIDE 40 MG: 10 INJECTION, SOLUTION INTRAMUSCULAR; INTRAVENOUS at 14:49

## 2020-01-11 RX ADMIN — PENTOSAN POLYSULFATE SODIUM 100 MG: 100 CAPSULE, GELATIN COATED ORAL at 06:03

## 2020-01-11 RX ADMIN — SODIUM CHLORIDE TAB 1 GM 1 G: 1 TAB at 19:20

## 2020-01-11 RX ADMIN — SODIUM CHLORIDE TAB 1 GM 1 G: 1 TAB at 06:02

## 2020-01-11 RX ADMIN — MIDODRINE HYDROCHLORIDE 5 MG: 5 TABLET ORAL at 19:20

## 2020-01-11 RX ADMIN — MELOXICAM 7.5 MG: 7.5 TABLET ORAL at 06:16

## 2020-01-11 RX ADMIN — GABAPENTIN 100 MG: 100 CAPSULE ORAL at 06:02

## 2020-01-11 RX ADMIN — BUDESONIDE AND FORMOTEROL FUMARATE DIHYDRATE 2 PUFF: 160; 4.5 AEROSOL RESPIRATORY (INHALATION) at 19:19

## 2020-01-11 RX ADMIN — FAMOTIDINE 20 MG: 20 TABLET ORAL at 20:07

## 2020-01-11 RX ADMIN — OXCARBAZEPINE 300 MG: 300 TABLET, FILM COATED ORAL at 06:02

## 2020-01-11 RX ADMIN — ACETAMINOPHEN 650 MG: 325 TABLET, FILM COATED ORAL at 14:32

## 2020-01-11 RX ADMIN — FAMOTIDINE 20 MG: 20 TABLET ORAL at 06:16

## 2020-01-11 RX ADMIN — POTASSIUM CHLORIDE 10 MEQ: 20 TABLET, EXTENDED RELEASE ORAL at 06:02

## 2020-01-11 RX ADMIN — ENOXAPARIN SODIUM 40 MG: 100 INJECTION SUBCUTANEOUS at 06:01

## 2020-01-11 RX ADMIN — MIDODRINE HYDROCHLORIDE 5 MG: 5 TABLET ORAL at 06:02

## 2020-01-11 RX ADMIN — METHENAMINE HIPPURATE 1 G: 1 TABLET ORAL at 06:02

## 2020-01-11 RX ADMIN — DIPHENHYDRAMINE HYDROCHLORIDE 25 MG: 50 INJECTION INTRAMUSCULAR; INTRAVENOUS at 15:23

## 2020-01-11 RX ADMIN — POTASSIUM CHLORIDE 10 MEQ: 20 TABLET, EXTENDED RELEASE ORAL at 19:19

## 2020-01-11 RX ADMIN — FUROSEMIDE 40 MG: 10 INJECTION, SOLUTION INTRAMUSCULAR; INTRAVENOUS at 20:07

## 2020-01-11 RX ADMIN — SENNOSIDES AND DOCUSATE SODIUM 2 TABLET: 8.6; 5 TABLET ORAL at 06:02

## 2020-01-11 RX ADMIN — BUDESONIDE AND FORMOTEROL FUMARATE DIHYDRATE 2 PUFF: 160; 4.5 AEROSOL RESPIRATORY (INHALATION) at 06:01

## 2020-01-11 ASSESSMENT — COGNITIVE AND FUNCTIONAL STATUS - GENERAL
MOBILITY SCORE: 9
CLIMB 3 TO 5 STEPS WITH RAILING: TOTAL
DRESSING REGULAR UPPER BODY CLOTHING: TOTAL
EATING MEALS: A LOT
STANDING UP FROM CHAIR USING ARMS: TOTAL
DRESSING REGULAR LOWER BODY CLOTHING: TOTAL
HELP NEEDED FOR BATHING: A LOT
SUGGESTED CMS G CODE MODIFIER DAILY ACTIVITY: CL
MOVING FROM LYING ON BACK TO SITTING ON SIDE OF FLAT BED: A LOT
WALKING IN HOSPITAL ROOM: TOTAL
DAILY ACTIVITIY SCORE: 9
SUGGESTED CMS G CODE MODIFIER MOBILITY: CM
TOILETING: TOTAL
MOVING TO AND FROM BED TO CHAIR: A LOT
TURNING FROM BACK TO SIDE WHILE IN FLAT BAD: A LOT
PERSONAL GROOMING: A LOT

## 2020-01-11 ASSESSMENT — ENCOUNTER SYMPTOMS
CHILLS: 0
NECK PAIN: 0
ABDOMINAL PAIN: 0
SHORTNESS OF BREATH: 1
MYALGIAS: 1
DIZZINESS: 1
NAUSEA: 0
NERVOUS/ANXIOUS: 0
HEARTBURN: 0
TREMORS: 0
ORTHOPNEA: 1
SPUTUM PRODUCTION: 1
HEADACHES: 0
INSOMNIA: 0
STRIDOR: 0
FEVER: 0
DIARRHEA: 0
COUGH: 0
PALPITATIONS: 0

## 2020-01-11 ASSESSMENT — ACTIVITIES OF DAILY LIVING (ADL): TOILETING: REQUIRES ASSIST

## 2020-01-11 ASSESSMENT — GAIT ASSESSMENTS: GAIT LEVEL OF ASSIST: UNABLE TO PARTICIPATE

## 2020-01-11 NOTE — PROGRESS NOTES
[facility] Daily Progress Note    Date of Service  1/11/2020    Chief Complaint  89 y.o. female admitted 1/8/2020 with pain after a fall    Hospital Course    This is an 89 yo female who fell on her left side and sustained a left humerus fracture and a left hip fracture. Orthopedic surgery is consulted and Dr. Rice recommends left hip surgical repair and no surgery for the left arm fracture.      Interval Problem Update  The patient had left hip fracture repair 1/9  The patient is dizzy with sitting, heart rate is rapid and she is short of breath    Consultants/Specialty  Orthopedic surgery dr. Rice    Code Status  DNR    Disposition  snf    Review of Systems  Review of Systems   Constitutional: Negative for chills, fever and malaise/fatigue.   HENT: Negative for congestion.    Respiratory: Positive for sputum production and shortness of breath. Negative for cough and stridor.         Clear sputum   Cardiovascular: Positive for orthopnea. Negative for chest pain, palpitations and leg swelling.   Gastrointestinal: Negative for abdominal pain, diarrhea, heartburn and nausea.   Genitourinary: Negative for dysuria and urgency.   Musculoskeletal: Positive for joint pain and myalgias. Negative for neck pain.        Left shoulder and hip pain   Skin: Negative for itching.   Neurological: Positive for dizziness. Negative for tremors and headaches.   Psychiatric/Behavioral: The patient is not nervous/anxious and does not have insomnia.         Physical Exam  Temp:  [37.1 °C (98.7 °F)-37.9 °C (100.3 °F)] 37.1 °C (98.8 °F)  Pulse:  [108-127] 123  Resp:  [18] 18  BP: ()/(41-54) 94/54  SpO2:  [94 %-98 %] 94 %    Physical Exam  Vitals signs and nursing note reviewed.   Constitutional:       Appearance: She is not ill-appearing or diaphoretic.   HENT:      Head:      Comments: Laceration over left eyebrown, bruising and swelling of left eyelids     Nose: Nose normal. No congestion.      Mouth/Throat:       Pharynx: Oropharynx is clear. No oropharyngeal exudate or posterior oropharyngeal erythema.   Eyes:      General: No scleral icterus.     Conjunctiva/sclera: Conjunctivae normal.      Pupils: Pupils are equal, round, and reactive to light.   Neck:      Musculoskeletal: Neck supple. No muscular tenderness.   Cardiovascular:      Rate and Rhythm: Regular rhythm. Tachycardia present.      Pulses: Normal pulses.      Heart sounds: Heart sounds are distant. No murmur.   Pulmonary:      Effort: Tachypnea and accessory muscle usage present. No respiratory distress.      Breath sounds: No stridor. Decreased breath sounds present. No wheezing or rhonchi.   Abdominal:      General: Bowel sounds are normal. There is no distension.      Palpations: Abdomen is soft.      Tenderness: There is no tenderness. There is no rebound.   Musculoskeletal:         General: Swelling, tenderness and signs of injury present.      Comments: Left hip surgical bandage in place with some dried blood   Lymphadenopathy:      Cervical: No cervical adenopathy.   Skin:     General: Skin is dry.      Coloration: Skin is pale. Skin is not jaundiced.      Findings: Bruising present. No erythema.      Comments: Bruise over left arm and left hip   Neurological:      Mental Status: She is alert and oriented to person, place, and time.      Coordination: Coordination abnormal.   Psychiatric:         Mood and Affect: Mood normal.         Thought Content: Thought content normal.         Fluids    Intake/Output Summary (Last 24 hours) at 1/11/2020 1259  Last data filed at 1/11/2020 0600  Gross per 24 hour   Intake 240 ml   Output 350 ml   Net -110 ml       Laboratory  Recent Labs     01/09/20  0445 01/10/20  0836 01/10/20  1728 01/11/20  0438   WBC 12.9* 12.4*  --  9.3   RBC 3.68* 2.43*  --  2.20*   HEMOGLOBIN 11.8* 7.7* 7.6* 7.0*   HEMATOCRIT 35.7* 23.2*  --  21.2*   MCV 97.0 95.5  --  96.4   MCH 32.1 31.7  --  31.8   MCHC 33.1* 33.2*  --  33.0*   RDW 45.6  45.9  --  46.4   PLATELETCT 118* 78*  --  68*   MPV 9.9 10.4  --  10.4     Recent Labs     01/08/20  1614 01/09/20  0445 01/11/20  0438   SODIUM 136 136 136   POTASSIUM 3.5* 4.9 4.0   CHLORIDE 106 104 105   CO2 18* 17* 20   GLUCOSE 103* 115* 125*   BUN 12 13 19   CREATININE 0.58 0.58 0.53   CALCIUM 7.9* 8.9 8.0*     Recent Labs     01/08/20  1614   APTT 29.5   INR 1.09               Imaging  DX-HIP-UNILATERAL-WITH PELVIS-1 VIEW LEFT   Final Result      Status post left hip hemiarthroplasty.      DX-HUMERUS 2+ LEFT   Final Result      Comminuted impacted left humeral neck fracture.      DX-SHOULDER 2+ LEFT   Final Result      Comminuted mildly angulated and impacted left humeral neck fracture.      DX-CHEST-LIMITED (1 VIEW)   Final Result      No acute cardiopulmonary abnormality.      DX-FEMUR-2+ LEFT   Final Result      1.  Acute left transcervical/basicervical femoral neck fracture.   2.  Osteopenia.      DX-PELVIS-1 OR 2 VIEWS   Final Result      1.  Acute left transcervical/basicervical femoral neck fracture.   2.  Osteopenia.      CT-HEAD W/O   Final Result      1.  Chronic ischemic changes.   2.  No acute intracranial abnormality.      DX-CHEST-PORTABLE (1 VIEW)    (Results Pending)        Assessment/Plan  * Hip fracture (HCC)  Assessment & Plan  Pain control with oxycodone as needed, iv dilaudid not required  Left hip surgical repair done 1/9      Acute respiratory failure with hypoxia (HCC)  Assessment & Plan  The patient is requiring supplemental oxygen and has increased work of breathing with frothy white sputum   Will check a chest xray  Give lasix  Respiratory therapy  Supplemental oxygen  Stop iv fluids    Acute blood loss as cause of postoperative anemia  Assessment & Plan  The patient is symptomatic with hemoglobin 7 she is tachycardic with heart rate 120-130, dizzy with sitting and hypotensive  Will transfuse blood and check labs    Metabolic acidosis  Assessment & Plan  Fluids given, stop now for  work of breathing increased  Will encourage oral supplements for nutrition    Humerus fracture  Assessment & Plan  Pain control    Passive and active assisted range of motion and non weight bearing to the injured extremity.   Will need repeat xray and follow up with Dr. Rice in two weeks    Idiopathic hypotension- (present on admission)  Assessment & Plan  midodrine    Seizure disorder (HCC)- (present on admission)  Assessment & Plan  Controlled on depakote, trileptal and neurontin, will continue meds    Left bundle branch block- (present on admission)  Assessment & Plan  Chronic and unchanged on ekgs    Vertigo- (present on admission)  Assessment & Plan  Treat as needed  Blood for dizziness caused by anemia       VTE prophylaxis: lovenox

## 2020-01-11 NOTE — PROGRESS NOTES
"Sleeping comfortably in bed on arrival.  Complains of pain in her back and left hip and shoulder.  Blood pressure and lack of energy precluded therapy today    /46   Pulse (!) 127   Temp 37.9 °C (100.3 °F) (Oral)   Resp 18   Ht 1.702 m (5' 7\")   Wt 81 kg (178 lb 9.2 oz)   SpO2 98%       Left hip shows a dressing that shows minimal drainage.  Normal sensation to foot active EHL FHL TA GSC  Left shoulder in sling ecchymoses and swelling about the shoulder    Plan:  Weightbearing as tolerated left lower extremity, posterior hip precautions  Nonweightbearing left upper extremity, sling for comfort  PT/OT  Follow hemoglobin trend  Discharge planning for SNF  2-week follow-up  "

## 2020-01-11 NOTE — ASSESSMENT & PLAN NOTE
Pulmonary edema on chest xray  Lasix given with improved work of breathing  However the patient developed hypotension with systolic blood pressure down to 64 that responded partially to fluids  Echocardiogram shows EF 20-25% and mitral valve dysfunction that is moderate to severe, I discussed this with cardiology, Dr. Sanz who recommends hospice

## 2020-01-11 NOTE — PROGRESS NOTES
Pt tachycardic at rest 120s. Dr. Callaway notified of BP, HR and Hgb. Orders received. Will cont to monitor.

## 2020-01-11 NOTE — DISCHARGE PLANNING
Received Choice form at 1256  Agency/Facility Name: [facility]  Referral sent per Choice form @ 4019

## 2020-01-11 NOTE — THERAPY
"Occupational Therapy Evaluation completed.   Functional Status:  Pt presents to skilled OT services following GLF at [facility] resulting in L humerus and L hip fracture now post L hip hemiarthroplasty WBAT with posterior hip precautions, LUE non-op with NO WB and sling for comfort precautions in place, per ortho consult \"therapy in the hospital for range of motion and ADLs.  Passive and active assisted range of motion and non weight bearing to the injured extremity.\" Pt currently requires max a for all ADLs and bed mobility, P+<>F- sitting balance eob. Pt is primarily limited by low BP in sitting 94/55 and symptomatic towards end, initially declined any dizziness, upon return back to bed 115/64, conscious  throughout the mobility. Post mobilization, c/o nausea but no emesis. Pt will benefit from acute skilled OT services while in house and post acute recommended prior to d/c home.  Per staff member at [facility], pt ambulates to BR, to the dining room with FWW and cga/min a from the staff, staff performs the ADLs except self-feeding and h/g tasks. Pt is below her baseline with mobility and oob activity tolerance, will benefit from acute OT services and post acute placement recommended.   Plan of Care: Will benefit from Occupational Therapy 3 times per week  Discharge Recommendations:  Equipment: Will Continue to Assess for Equipment Needs. Post-acute therapy Recommend post-acute placement for additional occupational therapy services prior to discharge home.       See \"Rehab Therapy-Acute\" Patient Summary Report for complete documentation.    "

## 2020-01-11 NOTE — PROGRESS NOTES
Received report, assumed pt care. Discussed POC. Encouraged CDB and leg exercises while in bed. Pt given instruction on IS. Will reinforce throughout the day. Will cont to monitor.

## 2020-01-11 NOTE — PROGRESS NOTES
Blood stopped at 1430 as pt's temp increased from 98.5 to 100.5. Saline infusing, and MD paged. RN remaining at bedside.

## 2020-01-11 NOTE — THERAPY
"Physical Therapy Evaluation completed.   Bed Mobility:  Supine to Sit: Total Assist X 2  Transfers:    Gait: Level Of Assist: Unable to Participate with Will Continue to Assess for Equipment Needs       Plan of Care: Will benefit from Physical Therapy 3 times per week  Discharge Recommendations: Equipment: Will Continue to Assess for Equipment Needs. Post-acute therapy Discharge to a transitional care facility for continued skilled therapy services.    Pt is a 88 y/o woman from [facility] admit follwoing fall sustaining L hip Fx, and L humerus Fx. Pt is s/p L hemiarthropasty femoral nk fx, L humerus fzx non-op. Pt is low BP, low H & H and tachy. Pt RN to get pt up EOB and therapies available to assist. BP monitored, Pt demo'ed poor tolerance EOB 3-5 mins, BP 94/55, pt c/o of nausea spitting saliva when back to supine. Pt would benefit from acute PT and post acute PT. Pt to get blood transfusion to day. Will see 3 x wk during acute stay. LK      See \"Rehab Therapy-Acute\" Patient Summary Report for complete documentation.     "

## 2020-01-12 ENCOUNTER — APPOINTMENT (OUTPATIENT)
Dept: RADIOLOGY | Facility: MEDICAL CENTER | Age: 85
DRG: 469 | End: 2020-01-12
Attending: INTERNAL MEDICINE
Payer: MEDICARE

## 2020-01-12 ENCOUNTER — APPOINTMENT (OUTPATIENT)
Dept: CARDIOLOGY | Facility: MEDICAL CENTER | Age: 85
DRG: 469 | End: 2020-01-12
Attending: INTERNAL MEDICINE
Payer: MEDICARE

## 2020-01-12 LAB
ANION GAP SERPL CALC-SCNC: 15 MMOL/L (ref 0–11.9)
BUN SERPL-MCNC: 22 MG/DL (ref 8–22)
CALCIUM SERPL-MCNC: 8.2 MG/DL (ref 8.4–10.2)
CHLORIDE SERPL-SCNC: 102 MMOL/L (ref 96–112)
CO2 SERPL-SCNC: 20 MMOL/L (ref 20–33)
CREAT SERPL-MCNC: 0.61 MG/DL (ref 0.5–1.4)
EKG IMPRESSION: NORMAL
ERYTHROCYTE [DISTWIDTH] IN BLOOD BY AUTOMATED COUNT: 48.9 FL (ref 35.9–50)
GLUCOSE SERPL-MCNC: 151 MG/DL (ref 65–99)
HCT VFR BLD AUTO: 21.6 % (ref 37–47)
HGB BLD-MCNC: 7.5 G/DL (ref 12–16)
LACTATE BLD-SCNC: 1.6 MMOL/L (ref 0.5–2)
LACTATE BLD-SCNC: 2 MMOL/L (ref 0.5–2)
LACTATE BLD-SCNC: 2.2 MMOL/L (ref 0.5–2)
LACTATE BLD-SCNC: 4.4 MMOL/L (ref 0.5–2)
LV EJECT FRACT  99904: 25
LV EJECT FRACT MOD 2C 99903: 40.16
LV EJECT FRACT MOD 4C 99902: 17.4
LV EJECT FRACT MOD BP 99901: 32.1
MAGNESIUM SERPL-MCNC: 1.3 MG/DL (ref 1.5–2.5)
MCH RBC QN AUTO: 32.3 PG (ref 27–33)
MCHC RBC AUTO-ENTMCNC: 34.7 G/DL (ref 33.6–35)
MCV RBC AUTO: 93.1 FL (ref 81.4–97.8)
PLATELET # BLD AUTO: 87 K/UL (ref 164–446)
PMV BLD AUTO: 10.8 FL (ref 9–12.9)
POTASSIUM SERPL-SCNC: 3.4 MMOL/L (ref 3.6–5.5)
PROCALCITONIN SERPL-MCNC: 0.78 NG/ML
RBC # BLD AUTO: 2.32 M/UL (ref 4.2–5.4)
SODIUM SERPL-SCNC: 137 MMOL/L (ref 135–145)
WBC # BLD AUTO: 15.7 K/UL (ref 4.8–10.8)

## 2020-01-12 PROCEDURE — 87040 BLOOD CULTURE FOR BACTERIA: CPT | Mod: 91

## 2020-01-12 PROCEDURE — 84145 PROCALCITONIN (PCT): CPT

## 2020-01-12 PROCEDURE — 99292 CRITICAL CARE ADDL 30 MIN: CPT | Performed by: HOSPITALIST

## 2020-01-12 PROCEDURE — 80048 BASIC METABOLIC PNL TOTAL CA: CPT

## 2020-01-12 PROCEDURE — 700111 HCHG RX REV CODE 636 W/ 250 OVERRIDE (IP): Performed by: INTERNAL MEDICINE

## 2020-01-12 PROCEDURE — 93005 ELECTROCARDIOGRAM TRACING: CPT | Performed by: INTERNAL MEDICINE

## 2020-01-12 PROCEDURE — 99291 CRITICAL CARE FIRST HOUR: CPT | Performed by: INTERNAL MEDICINE

## 2020-01-12 PROCEDURE — 93306 TTE W/DOPPLER COMPLETE: CPT | Mod: 26 | Performed by: INTERNAL MEDICINE

## 2020-01-12 PROCEDURE — 99292 CRITICAL CARE ADDL 30 MIN: CPT | Performed by: INTERNAL MEDICINE

## 2020-01-12 PROCEDURE — 71045 X-RAY EXAM CHEST 1 VIEW: CPT

## 2020-01-12 PROCEDURE — 93010 ELECTROCARDIOGRAM REPORT: CPT | Mod: 76 | Performed by: INTERNAL MEDICINE

## 2020-01-12 PROCEDURE — 93306 TTE W/DOPPLER COMPLETE: CPT

## 2020-01-12 PROCEDURE — 700111 HCHG RX REV CODE 636 W/ 250 OVERRIDE (IP): Performed by: HOSPITALIST

## 2020-01-12 PROCEDURE — 93010 ELECTROCARDIOGRAM REPORT: CPT | Performed by: INTERNAL MEDICINE

## 2020-01-12 PROCEDURE — 770020 HCHG ROOM/CARE - TELE (206)

## 2020-01-12 PROCEDURE — 83735 ASSAY OF MAGNESIUM: CPT

## 2020-01-12 PROCEDURE — A9270 NON-COVERED ITEM OR SERVICE: HCPCS | Performed by: HOSPITALIST

## 2020-01-12 PROCEDURE — 85027 COMPLETE CBC AUTOMATED: CPT

## 2020-01-12 PROCEDURE — 700105 HCHG RX REV CODE 258: Performed by: INTERNAL MEDICINE

## 2020-01-12 PROCEDURE — A9270 NON-COVERED ITEM OR SERVICE: HCPCS | Performed by: INTERNAL MEDICINE

## 2020-01-12 PROCEDURE — 700102 HCHG RX REV CODE 250 W/ 637 OVERRIDE(OP): Performed by: INTERNAL MEDICINE

## 2020-01-12 PROCEDURE — 700102 HCHG RX REV CODE 250 W/ 637 OVERRIDE(OP): Performed by: HOSPITALIST

## 2020-01-12 PROCEDURE — 83605 ASSAY OF LACTIC ACID: CPT | Mod: 91

## 2020-01-12 RX ORDER — SODIUM CHLORIDE 9 MG/ML
1000 INJECTION, SOLUTION INTRAVENOUS ONCE
Status: COMPLETED | OUTPATIENT
Start: 2020-01-12 | End: 2020-01-12

## 2020-01-12 RX ORDER — ACETAMINOPHEN 160 MG
2000 TABLET,DISINTEGRATING ORAL DAILY
Status: ON HOLD | COMMUNITY
End: 2020-01-16

## 2020-01-12 RX ORDER — SODIUM CHLORIDE, SODIUM LACTATE, POTASSIUM CHLORIDE, AND CALCIUM CHLORIDE .6; .31; .03; .02 G/100ML; G/100ML; G/100ML; G/100ML
30 INJECTION, SOLUTION INTRAVENOUS
Status: DISCONTINUED | OUTPATIENT
Start: 2020-01-12 | End: 2020-01-12

## 2020-01-12 RX ORDER — FLUTICASONE PROPIONATE 50 MCG
1 SPRAY, SUSPENSION (ML) NASAL DAILY
COMMUNITY

## 2020-01-12 RX ORDER — SODIUM CHLORIDE 9 MG/ML
500 INJECTION, SOLUTION INTRAVENOUS ONCE
Status: COMPLETED | OUTPATIENT
Start: 2020-01-12 | End: 2020-01-12

## 2020-01-12 RX ORDER — DEXTROSE MONOHYDRATE 50 MG/ML
INJECTION, SOLUTION INTRAVENOUS CONTINUOUS
Status: DISCONTINUED | OUTPATIENT
Start: 2020-01-12 | End: 2020-01-13

## 2020-01-12 RX ORDER — SODIUM CHLORIDE, SODIUM LACTATE, POTASSIUM CHLORIDE, AND CALCIUM CHLORIDE .6; .31; .03; .02 G/100ML; G/100ML; G/100ML; G/100ML
500 INJECTION, SOLUTION INTRAVENOUS
Status: DISCONTINUED | OUTPATIENT
Start: 2020-01-12 | End: 2020-01-12

## 2020-01-12 RX ORDER — POTASSIUM CHLORIDE 750 MG/1
10 TABLET, EXTENDED RELEASE ORAL 2 TIMES DAILY
Status: ON HOLD | COMMUNITY
End: 2020-01-16

## 2020-01-12 RX ORDER — MAGNESIUM SULFATE HEPTAHYDRATE 40 MG/ML
2 INJECTION, SOLUTION INTRAVENOUS ONCE
Status: COMPLETED | OUTPATIENT
Start: 2020-01-12 | End: 2020-01-12

## 2020-01-12 RX ADMIN — MIDODRINE HYDROCHLORIDE 5 MG: 5 TABLET ORAL at 18:08

## 2020-01-12 RX ADMIN — ACETAMINOPHEN 650 MG: 325 TABLET, FILM COATED ORAL at 15:49

## 2020-01-12 RX ADMIN — POTASSIUM CHLORIDE 10 MEQ: 20 TABLET, EXTENDED RELEASE ORAL at 05:07

## 2020-01-12 RX ADMIN — ONDANSETRON 4 MG: 2 INJECTION INTRAMUSCULAR; INTRAVENOUS at 20:07

## 2020-01-12 RX ADMIN — PENTOSAN POLYSULFATE SODIUM 100 MG: 100 CAPSULE, GELATIN COATED ORAL at 05:08

## 2020-01-12 RX ADMIN — PIPERACILLIN AND TAZOBACTAM 3.38 G: 3; .375 INJECTION, POWDER, LYOPHILIZED, FOR SOLUTION INTRAVENOUS; PARENTERAL at 22:23

## 2020-01-12 RX ADMIN — OXCARBAZEPINE 300 MG: 300 TABLET, FILM COATED ORAL at 05:07

## 2020-01-12 RX ADMIN — FAMOTIDINE 20 MG: 20 TABLET ORAL at 05:08

## 2020-01-12 RX ADMIN — PIPERACILLIN AND TAZOBACTAM 3.38 G: 3; .375 INJECTION, POWDER, LYOPHILIZED, FOR SOLUTION INTRAVENOUS; PARENTERAL at 10:00

## 2020-01-12 RX ADMIN — GABAPENTIN 100 MG: 100 CAPSULE ORAL at 05:07

## 2020-01-12 RX ADMIN — SODIUM CHLORIDE TAB 1 GM 1 G: 1 TAB at 18:08

## 2020-01-12 RX ADMIN — FAMOTIDINE 20 MG: 20 TABLET ORAL at 18:08

## 2020-01-12 RX ADMIN — FLUTICASONE PROPIONATE 100 MCG: 50 SPRAY, METERED NASAL at 05:08

## 2020-01-12 RX ADMIN — DIVALPROEX SODIUM 1000 MG: 250 TABLET, FILM COATED, EXTENDED RELEASE ORAL at 21:56

## 2020-01-12 RX ADMIN — VANCOMYCIN HYDROCHLORIDE 2000 MG: 500 INJECTION, POWDER, LYOPHILIZED, FOR SOLUTION INTRAVENOUS at 12:15

## 2020-01-12 RX ADMIN — BUDESONIDE AND FORMOTEROL FUMARATE DIHYDRATE 2 PUFF: 160; 4.5 AEROSOL RESPIRATORY (INHALATION) at 05:08

## 2020-01-12 RX ADMIN — METHENAMINE HIPPURATE 1 G: 1 TABLET ORAL at 06:35

## 2020-01-12 RX ADMIN — SODIUM CHLORIDE 1000 ML: 9 INJECTION, SOLUTION INTRAVENOUS at 10:06

## 2020-01-12 RX ADMIN — FUROSEMIDE 40 MG: 10 INJECTION, SOLUTION INTRAMUSCULAR; INTRAVENOUS at 05:08

## 2020-01-12 RX ADMIN — MELOXICAM 7.5 MG: 7.5 TABLET ORAL at 05:08

## 2020-01-12 RX ADMIN — PIPERACILLIN AND TAZOBACTAM 3.38 G: 3; .375 INJECTION, POWDER, LYOPHILIZED, FOR SOLUTION INTRAVENOUS; PARENTERAL at 14:52

## 2020-01-12 RX ADMIN — MAGNESIUM SULFATE HEPTAHYDRATE 2 G: 40 INJECTION, SOLUTION INTRAVENOUS at 20:00

## 2020-01-12 RX ADMIN — ACETAMINOPHEN 650 MG: 325 TABLET, FILM COATED ORAL at 05:46

## 2020-01-12 RX ADMIN — ACETAMINOPHEN 650 MG: 325 TABLET, FILM COATED ORAL at 22:38

## 2020-01-12 RX ADMIN — POTASSIUM CHLORIDE 10 MEQ: 20 TABLET, EXTENDED RELEASE ORAL at 18:08

## 2020-01-12 RX ADMIN — MIDODRINE HYDROCHLORIDE 5 MG: 5 TABLET ORAL at 05:07

## 2020-01-12 RX ADMIN — SODIUM CHLORIDE 500 ML: 9 INJECTION, SOLUTION INTRAVENOUS at 08:40

## 2020-01-12 RX ADMIN — ENOXAPARIN SODIUM 40 MG: 100 INJECTION SUBCUTANEOUS at 05:09

## 2020-01-12 RX ADMIN — SENNOSIDES AND DOCUSATE SODIUM 2 TABLET: 8.6; 5 TABLET ORAL at 18:08

## 2020-01-12 RX ADMIN — SODIUM CHLORIDE 500 ML: 9 INJECTION, SOLUTION INTRAVENOUS at 08:00

## 2020-01-12 RX ADMIN — SODIUM CHLORIDE TAB 1 GM 1 G: 1 TAB at 05:07

## 2020-01-12 NOTE — PROGRESS NOTES
Blood transfusion stopped at 4hr colby per protocol. 275ml of the 350ml of blood infused. Pt's vitals continue to remain stable. Will cont to monitor.

## 2020-01-12 NOTE — PROGRESS NOTES
Pt transferred to tele floor for HR sustaining 130s-140s.  Pt lethargic, disoriented to place and event and c/o nausea with one episode of small, clear emesis.  MD, Dr. Matias paged and one time dose of 10mg IV cardizem, one time dose of 10mg IV reglan, and CMP/CBC ordered.  New 22g IV placed in L ac due to catheter displacement of old IV.  Will continue to monitor.

## 2020-01-12 NOTE — CARE PLAN
Problem: Communication  Goal: The ability to communicate needs accurately and effectively will improve  Outcome: PROGRESSING AS EXPECTED  Intervention: [place] patient and significant other/support system to call light to alert staff of needs  Flowsheets (Taken 1/11/2020 2331)  Oriented to:: All of the Following : Location of Bathroom, Visiting Policy, Unit Routine, Call Light and Bedside Controls, Bedside Rail Policy, Smoking Policy, Rights and Responsibilities, Bedside Report, and Patient Education Notebook     Problem: Safety  Goal: Will remain free from falls  Outcome: PROGRESSING AS EXPECTED  Intervention: Implement fall precautions  Flowsheets  Taken 1/11/2020 2331  Bed Alarm: Yes - Alarm On  Taken 1/11/2020 2232  Environmental Precautions: Treaded Slipper Socks on Patient;Personal Belongings, Wastebasket, Call Bell etc. in Easy Reach;Transferred to Stronger Side;Report Given to Other Health Care Providers Regarding Fall Risk;Bed in Low Position;Communication Sign for Patients & Families;Mobility Assessed & Appropriate Sign Placed

## 2020-01-12 NOTE — ASSESSMENT & PLAN NOTE
This is Sepsis Not present on admission  SIRS criteria identified on my evaluation include: Fever, with temperature greater than 101 deg F, Tachycardia, with heart rate greater than 90 BPM, Tachypnea, with respirations greater than 20 per minute and Leukocyosis, with WBC greater than 12,000  Source is pneumonia  Sepsis protocol initiated  Fluid resuscitation ordered per protocol  IV antibiotics as appropriate for source of sepsis  Continue zosyn for now  Family deciding on comfort care vs hospice

## 2020-01-12 NOTE — PROGRESS NOTES
Charge nurse note - Patient remains on telemetry and heart rate in the 130s and 140s per MT monitor tech.  Patient is short of breath and oxygen saturation is in the low 90s on 2 liters.  NAM and MT supervisor here at bedside for assess patient with GSU charge. Call made to Dr. Matias with update and new orders received to transfer patient to MT with telemetry.

## 2020-01-12 NOTE — DISCHARGE PLANNING
Agency/Facility Name: [facility]  Spoke To: Zane  Outcome: Pt. Accepted. No beds available until 1/13.        RN ROOSEVELT informed

## 2020-01-12 NOTE — PROGRESS NOTES
Telemetry Shift Summary    Rhythm ST w/ BBB  HR Range 110s-120s (brief highs of 150s)  Ectopy occ PVCs, occ-freq PACs  Measurements 0.16/0.14/0.34        Normal Values  Rhythm SR  HR Range    Measurements 0.12-0.20 / 0.06-0.10  / 0.30-0.52

## 2020-01-12 NOTE — PROGRESS NOTES
Monitor tech notified this RN that there were ST changes on monitor.  Stat EKG performed.  Pt stated that it felt like her heart was racing, but denied crushing or burning chest pain.

## 2020-01-12 NOTE — PROGRESS NOTES
"Patient seen and examined  NO complaints resting in bed. Discussed care with nurse    BP (!) 70/35   Pulse 94   Temp 36.6 °C (97.9 °F) (Axillary)   Resp (!) 28   Ht 1.702 m (5' 7\")   Wt 81 kg (178 lb 9.2 oz)   SpO2 100%     Recent Labs     01/11/20  0438 01/11/20  2307 01/12/20  0338   WBC 9.3 14.2* 15.7*   RBC 2.20* 2.73* 2.32*   HEMOGLOBIN 7.0* 8.9* 7.5*   HEMATOCRIT 21.2* 26.0* 21.6*   MCV 96.4 95.2 93.1   MCH 31.8 32.6 32.3   MCHC 33.0* 34.2 34.7   RDW 46.4 50.0 48.9   PLATELETCT 68* 105* 87*   MPV 10.4 10.9 10.8       No acute distress  Dressing clean dry and intact  L shoulder in sling.   Neurovascularly intact        Plan:  WBAT L LE. Post precautions. NWB L UE SLing when up ok to use pillows in bed. PT/OT when stable.  Tylenol for pain Toradol OK per ortho only if needed. Prefer to avioid.             "

## 2020-01-12 NOTE — PROGRESS NOTES
Dr. Callaway in to see patient. Pt will possibly be transferred to ICU. MD will call patient's family. Awaiting orders

## 2020-01-12 NOTE — PROGRESS NOTES
Dr. Callaway updated on pt's vitals and urine output. Dr. Callaway has given the ok to continue blood transfusion. Blood restarted with close monitoring in place.

## 2020-01-12 NOTE — PROGRESS NOTES
Asked Dr. Callaway if any need to send blood to lab for testing and she stated that it's not necessary.

## 2020-01-12 NOTE — PROGRESS NOTES
Received bedside report from day shift RN.  Informed of transfusion and transfusion reaction.  Pt A&O x 4   but gradually increased to the 120's then to the 130's while in the room.  Pt reported a 3/10 pain level in her left arm.  Pt was hungry and had not ate, so food was cut up and pt at 25% of it and some of her milk.  Pt then given medications. Threw up medications 15 minutes afterwards. HR dropped to 115 then elevated back to 130's while vomiting. Monitor tech called multiple times to bring awareness to the increasing HR.  NAM and MT supervisor came to assess. Hospitalist called and orders received to transfer pt to telemetry.   Came back to room and pt desated to 50's. O2 increased to 5 liters. HR dropped from 150's to 50's and back up. Pt feeling nauseous.  Tele RN came down to bedside and pt transferred up to Tele floor.

## 2020-01-12 NOTE — PROGRESS NOTES
Hospital Medicine Daily Progress Note    Date of Service  1/12/2020    Chief Complaint  89 y.o. female admitted 1/8/2020 with pain after a fall    Hospital Course    This is an 88 yo female who fell on her left side and sustained a left humerus fracture and a left hip fracture. Orthopedic surgery is consulted and Dr. Rice recommends left hip surgical repair and no surgery for the left arm fracture.      Interval Problem Update  The patient had a fever of 101 last night, she has worsened hypotension with systolic pressure 64 this morning, tachycardia and worsened leukocytosis. She is more lethargic today and not answering questions well.    Consultants/Specialty  Orthopedic surgery dr. Rice    Code Status  DNR    Disposition  snf    Review of Systems  Review of Systems   Unable to perform ROS: Mental acuity        Physical Exam  Temp:  [36.6 °C (97.9 °F)-38.3 °C (101 °F)] 36.6 °C (97.9 °F)  Pulse:  [] 92  Resp:  [18-44] 28  BP: ()/(34-71) 70/34  SpO2:  [94 %-100 %] 100 %    Physical Exam  Vitals signs and nursing note reviewed.   Constitutional:       General: She is not in acute distress.     Appearance: She is obese. She is not ill-appearing or diaphoretic.   HENT:      Head:      Comments: Laceration over left eyebrown, bruising and swelling of left eyelids     Nose: No congestion or rhinorrhea.      Mouth/Throat:      Pharynx: Oropharynx is clear. No oropharyngeal exudate.   Eyes:      General: No scleral icterus.     Conjunctiva/sclera: Conjunctivae normal.      Pupils: Pupils are equal, round, and reactive to light.   Neck:      Musculoskeletal: Neck supple. No muscular tenderness.   Cardiovascular:      Rate and Rhythm: Regular rhythm. Tachycardia present.      Pulses: Normal pulses.      Heart sounds: Heart sounds are distant. No murmur.   Pulmonary:      Effort: Tachypnea and accessory muscle usage present. No respiratory distress.      Breath sounds: No stridor. Decreased breath sounds  present. No wheezing or rhonchi.   Abdominal:      General: There is no distension.      Palpations: Abdomen is soft.      Tenderness: There is no tenderness. There is no rebound.   Musculoskeletal:         General: Swelling, tenderness and signs of injury present.      Comments: Left hip surgical bandage in place with some dried blood   Lymphadenopathy:      Cervical: No cervical adenopathy.   Skin:     General: Skin is dry.      Coloration: Skin is pale. Skin is not jaundiced.      Findings: Bruising present. No erythema.      Comments: Bruise over left arm and left hip   Neurological:      Mental Status: She is lethargic.         Fluids    Intake/Output Summary (Last 24 hours) at 1/12/2020 0815  Last data filed at 1/11/2020 2122  Gross per 24 hour   Intake 395 ml   Output 750 ml   Net -355 ml       Laboratory  Recent Labs     01/11/20 0438 01/11/20 2307 01/12/20 0338   WBC 9.3 14.2* 15.7*   RBC 2.20* 2.73* 2.32*   HEMOGLOBIN 7.0* 8.9* 7.5*   HEMATOCRIT 21.2* 26.0* 21.6*   MCV 96.4 95.2 93.1   MCH 31.8 32.6 32.3   MCHC 33.0* 34.2 34.7   RDW 46.4 50.0 48.9   PLATELETCT 68* 105* 87*   MPV 10.4 10.9 10.8     Recent Labs     01/11/20 0438 01/11/20 2307 01/12/20  0338   SODIUM 136 139 137   POTASSIUM 4.0 3.6 3.4*   CHLORIDE 105 101 102   CO2 20 20 20   GLUCOSE 125* 160* 151*   BUN 19 22 22   CREATININE 0.53 0.57 0.61   CALCIUM 8.0* 8.4 8.2*                   Imaging  DX-CHEST-PORTABLE (1 VIEW)   Final Result      1.  Enlarged cardiac silhouette with changes of interstitial edema.   2.  Ill-defined left lower lobe and right upper lobe opacities could be due to edema, atelectasis or pneumonitis.      DX-HIP-UNILATERAL-WITH PELVIS-1 VIEW LEFT   Final Result      Status post left hip hemiarthroplasty.      DX-HUMERUS 2+ LEFT   Final Result      Comminuted impacted left humeral neck fracture.      DX-SHOULDER 2+ LEFT   Final Result      Comminuted mildly angulated and impacted left humeral neck fracture.       DX-CHEST-LIMITED (1 VIEW)   Final Result      No acute cardiopulmonary abnormality.      DX-FEMUR-2+ LEFT   Final Result      1.  Acute left transcervical/basicervical femoral neck fracture.   2.  Osteopenia.      DX-PELVIS-1 OR 2 VIEWS   Final Result      1.  Acute left transcervical/basicervical femoral neck fracture.   2.  Osteopenia.      CT-HEAD W/O   Final Result      1.  Chronic ischemic changes.   2.  No acute intracranial abnormality.           Assessment/Plan  * Hip fracture ([facility])  Assessment & Plan  Pain control with oxycodone as needed, iv dilaudid as needed  Left hip surgical repair done 1/9      Acute respiratory failure with hypoxia ([facility])  Assessment & Plan  Pulmonary edema on chest xray  Lasix given yesterday with improved work of breathing  Today the patient is hypotensive and fluid has been started  Will check an echocardiogram and monitor with a repeat chest xray if family chooses to continue care    Acute blood loss as cause of postoperative anemia  Assessment & Plan  Blood transfused, will monitor labs    Metabolic acidosis  Assessment & Plan  Treated with fluids with improvement    Humerus fracture  Assessment & Plan  Pain control    Passive and active assisted range of motion and non weight bearing to the injured extremity.   Will need repeat xray and follow up with Dr. Rice in two weeks    Idiopathic hypotension- (present on admission)  Assessment & Plan  Midodrine as patient has chronic hypotension  Current hypotension is due to sepsis, will continue midodrine but the the patient will require pressors acutely and family is discussing this and wants to wait on pressors for now    Sepsis ([facility])  Assessment & Plan  This is Sepsis Not present on admission  SIRS criteria identified on my evaluation include: Fever, with temperature greater than 101 deg F, Tachycardia, with heart rate greater than 90 BPM, Tachypnea, with respirations greater than 20 per minute and Leukocyosis, with WBC greater  than 12,000  Source is pneumonia  Sepsis protocol initiated  Fluid resuscitation ordered per protocol  IV antibiotics as appropriate for source of sepsis  While organ dysfunction may be noted elsewhere in this problem list or in the chart, degree of organ dysfunction does not meet CMS criteria for severe sepsis  Will start iv antibiotics  Patient is severely ill with severe sepsis with systolic blood pressure in the 60's   I did discuss that the patient may die without pressors and other aggressive interventions, I talked with daughters Coni and Sade        Seizure disorder (HCC)- (present on admission)  Assessment & Plan  Controlled on depakote, trileptal and neurontin, will continue meds    Left bundle branch block- (present on admission)  Assessment & Plan  Chronic and unchanged on ekgs    Vertigo- (present on admission)  Assessment & Plan  Treat as needed  Blood for dizziness caused by anemia     The patient is critically ill with hypotension, severe sepsis and acute metabolic encephalopathy due to sepsis. She will likley die without ICU care with pressors but has high risk to die with this intervention as well. She is DNR/DNI, no feeding tube, etc. I confirmed this with daughter Coni today and yesterday with daughter Sade. I conferred with the intensivist emergently, Dr. Paz, who agrees with the plan. I spent 79 minutes in critical care time with this patient with no overlap with other providers. I am monitoring repeated vital sign checks, assessing the patients airway, response to iv fluids, placing orders, discussing with intensive care and ICU staff.  VTE prophylaxis: lovenox

## 2020-01-12 NOTE — CARE PLAN
Problem: Safety  Goal: Will remain free from falls  Outcome: PROGRESSING AS EXPECTED  Note:   Reinforced fall risk precautions. Bed alarm on, call light in reach. PT unstable, unable to mobilize.      Problem: Pain Management  Goal: Pain level will decrease to patient's comfort goal  Outcome: PROGRESSING AS EXPECTED  Note:   Pt c/o minimal pain, will continue to monitor.      Problem: Skin Integrity  Goal: Risk for impaired skin integrity will decrease  Outcome: PROGRESSING AS EXPECTED  Note:   Implement precautions to protect skin integrity in collaboration with the interdisciplinary team. Almazan catheter in place. Barrier cream applied as needed for incontinence. Checking patient frequently to ensure skin remains CDI. Turn patient Q2 hrs while in bed. Encourage good PO intake and OOB activity (when stable).

## 2020-01-12 NOTE — PROGRESS NOTES
No reaction suspected after 15 minutes of restarting blood transfusion. VSS. Will cont close monitoring.

## 2020-01-12 NOTE — PROGRESS NOTES
"Pharmacy Kinetics 89 y.o. female on vancomycin day # 1 2020    Provider specified end date: 20    Indication for Treatment: Pneumonia    Pertinent history per medical record: Admitted on 2020 for left humerus and left hip fracture from fall, s/p hip fxr repair with post-op anemia and respiratory failure.  Developed sepsis POD#3; fever 101 F, respirations > 20, hypotension, tachycardia, and leukocytosis, CXR reveals pulmonary edema per MD note.    Other antibiotics: Zosyn 3.375 gm IV Q8H extended infusion    Allergies: Ciprofloxacin and Prednisone     List concerns for renal function: BUN/SCr ratio > 20:1, low albumin, hypermetabolic state (SIRS), nephrotoxic drugs (Vanc + Zosyn)    Pertinent cultures to date:    Blood cultures pending   Sputum culture ordered    MRSA nares swab if pneumonia is a concern (ordered/positive/negative/n-a): ordered    Recent Labs     01/10/20  0836 20  0438 20  0338   WBC 12.4* 9.3 14.2* 15.7*   NEUTSPOLYS  --  76.60* 83.20*  --      Recent Labs     20  0438 20  23020  0338   BUN 19 22 22   CREATININE 0.53 0.57 0.61   ALBUMIN  --  3.0*  --      No results for input(s): VANCOTROUGH, VANCOPEAK, VANCORANDOM in the last 72 hours.    Intake/Output Summary (Last 24 hours) at 2020 0836  Last data filed at 2020 0600  Gross per 24 hour   Intake 395 ml   Output 1750 ml   Net -1355 ml      BP (!) 70/35   Pulse 94   Temp 36.6 °C (97.9 °F) (Axillary)   Resp (!) 28   Ht 1.702 m (5' 7\")   Wt 81 kg (178 lb 9.2 oz)   SpO2 100%  Temp (24hrs), Av.3 °C (99.1 °F), Min:36.6 °C (97.9 °F), Max:38.3 °C (101 °F)      A/P   1. Vancomycin 2000 mg loading dose to be given this am followed by 1250 mg IV Q24H tomorrow.  2. Next vancomycin level:  0830  3. Goal trough: 16 - 20 mcg/ml  4. Comments: Plan to check vancomycin trough prior to 3rd dose and adjust if needed per protocol.  Will monitor daily as patient has multiple " concerns for renal function.    JUAN KimD

## 2020-01-12 NOTE — PROGRESS NOTES
Called MD to notify her about low BP 80s systolic. 500 mL bolus ordered.   Pt lethargic but able to respond to some questions. Charge nurse notified.   Serial BP done - new BP of 70s systolic. MD states she will be into see patient asap.

## 2020-01-12 NOTE — PROGRESS NOTES
Daughters updated by MD. Plan for patient to stay on M/T unit for now. Will continue to monitor. Bolus infusing.

## 2020-01-12 NOTE — PROGRESS NOTES
Bedside report received from KALEIGH Omalley. Patient resting comfortably in bed. Declines any needs at this time. Call light in reach. Bed alarm on. Safety measures in place.

## 2020-01-13 ENCOUNTER — HOSPICE ADMISSION (OUTPATIENT)
Dept: HOSPICE | Facility: HOSPICE | Age: 85
End: 2020-01-13
Payer: MEDICARE

## 2020-01-13 ENCOUNTER — HOME CARE VISIT (OUTPATIENT)
Dept: HOSPICE | Facility: HOSPICE | Age: 85
End: 2020-01-13
Payer: MEDICARE

## 2020-01-13 PROBLEM — I48.0 PAROXYSMAL ATRIAL FIBRILLATION (HCC): Status: ACTIVE | Noted: 2020-01-13

## 2020-01-13 PROCEDURE — 700102 HCHG RX REV CODE 250 W/ 637 OVERRIDE(OP): Performed by: HOSPITALIST

## 2020-01-13 PROCEDURE — 700105 HCHG RX REV CODE 258: Performed by: INTERNAL MEDICINE

## 2020-01-13 PROCEDURE — 700111 HCHG RX REV CODE 636 W/ 250 OVERRIDE (IP): Performed by: HOSPITALIST

## 2020-01-13 PROCEDURE — 700111 HCHG RX REV CODE 636 W/ 250 OVERRIDE (IP): Performed by: INTERNAL MEDICINE

## 2020-01-13 PROCEDURE — 770020 HCHG ROOM/CARE - TELE (206)

## 2020-01-13 PROCEDURE — A9270 NON-COVERED ITEM OR SERVICE: HCPCS | Performed by: HOSPITALIST

## 2020-01-13 PROCEDURE — 99233 SBSQ HOSP IP/OBS HIGH 50: CPT | Performed by: INTERNAL MEDICINE

## 2020-01-13 PROCEDURE — 700102 HCHG RX REV CODE 250 W/ 637 OVERRIDE(OP): Performed by: INTERNAL MEDICINE

## 2020-01-13 PROCEDURE — A9270 NON-COVERED ITEM OR SERVICE: HCPCS | Performed by: INTERNAL MEDICINE

## 2020-01-13 RX ORDER — KETOROLAC TROMETHAMINE 30 MG/ML
15 INJECTION, SOLUTION INTRAMUSCULAR; INTRAVENOUS EVERY 6 HOURS PRN
Status: DISCONTINUED | OUTPATIENT
Start: 2020-01-13 | End: 2020-01-16 | Stop reason: HOSPADM

## 2020-01-13 RX ORDER — AMIODARONE HYDROCHLORIDE 200 MG/1
400 TABLET ORAL TWICE DAILY
Status: DISCONTINUED | OUTPATIENT
Start: 2020-01-13 | End: 2020-01-13

## 2020-01-13 RX ORDER — METOCLOPRAMIDE HYDROCHLORIDE 5 MG/ML
10 INJECTION INTRAMUSCULAR; INTRAVENOUS EVERY 6 HOURS PRN
Status: DISCONTINUED | OUTPATIENT
Start: 2020-01-13 | End: 2020-01-16 | Stop reason: HOSPADM

## 2020-01-13 RX ORDER — MAGNESIUM SULFATE HEPTAHYDRATE 40 MG/ML
2 INJECTION, SOLUTION INTRAVENOUS ONCE
Status: COMPLETED | OUTPATIENT
Start: 2020-01-13 | End: 2020-01-13

## 2020-01-13 RX ADMIN — FAMOTIDINE 20 MG: 20 TABLET ORAL at 05:57

## 2020-01-13 RX ADMIN — GABAPENTIN 100 MG: 100 CAPSULE ORAL at 05:57

## 2020-01-13 RX ADMIN — MIDODRINE HYDROCHLORIDE 5 MG: 5 TABLET ORAL at 17:35

## 2020-01-13 RX ADMIN — POTASSIUM CHLORIDE 10 MEQ: 20 TABLET, EXTENDED RELEASE ORAL at 05:58

## 2020-01-13 RX ADMIN — MIDODRINE HYDROCHLORIDE 5 MG: 5 TABLET ORAL at 05:47

## 2020-01-13 RX ADMIN — METOCLOPRAMIDE 10 MG: 5 INJECTION, SOLUTION INTRAMUSCULAR; INTRAVENOUS at 01:14

## 2020-01-13 RX ADMIN — OXCARBAZEPINE 300 MG: 300 TABLET, FILM COATED ORAL at 05:57

## 2020-01-13 RX ADMIN — BUDESONIDE AND FORMOTEROL FUMARATE DIHYDRATE 2 PUFF: 160; 4.5 AEROSOL RESPIRATORY (INHALATION) at 05:46

## 2020-01-13 RX ADMIN — DIVALPROEX SODIUM 1000 MG: 250 TABLET, FILM COATED, EXTENDED RELEASE ORAL at 21:00

## 2020-01-13 RX ADMIN — KETOROLAC TROMETHAMINE 15 MG: 30 INJECTION, SOLUTION INTRAMUSCULAR at 22:24

## 2020-01-13 RX ADMIN — BUDESONIDE AND FORMOTEROL FUMARATE DIHYDRATE 2 PUFF: 160; 4.5 AEROSOL RESPIRATORY (INHALATION) at 20:56

## 2020-01-13 RX ADMIN — MAGNESIUM SULFATE HEPTAHYDRATE 2 G: 40 INJECTION, SOLUTION INTRAVENOUS at 10:15

## 2020-01-13 RX ADMIN — KETOROLAC TROMETHAMINE 15 MG: 30 INJECTION, SOLUTION INTRAMUSCULAR at 02:04

## 2020-01-13 RX ADMIN — POTASSIUM CHLORIDE 10 MEQ: 20 TABLET, EXTENDED RELEASE ORAL at 17:35

## 2020-01-13 RX ADMIN — FLUTICASONE PROPIONATE 100 MCG: 50 SPRAY, METERED NASAL at 05:47

## 2020-01-13 RX ADMIN — ONDANSETRON 4 MG: 2 INJECTION INTRAMUSCULAR; INTRAVENOUS at 10:37

## 2020-01-13 RX ADMIN — FAMOTIDINE 20 MG: 20 TABLET ORAL at 17:35

## 2020-01-13 RX ADMIN — PENTOSAN POLYSULFATE SODIUM 100 MG: 100 CAPSULE, GELATIN COATED ORAL at 05:54

## 2020-01-13 RX ADMIN — METHENAMINE HIPPURATE 1 G: 1 TABLET ORAL at 05:55

## 2020-01-13 RX ADMIN — PIPERACILLIN AND TAZOBACTAM 3.38 G: 3; .375 INJECTION, POWDER, LYOPHILIZED, FOR SOLUTION INTRAVENOUS; PARENTERAL at 14:08

## 2020-01-13 RX ADMIN — ENOXAPARIN SODIUM 40 MG: 100 INJECTION SUBCUTANEOUS at 05:44

## 2020-01-13 RX ADMIN — ACETAMINOPHEN 650 MG: 325 TABLET, FILM COATED ORAL at 12:58

## 2020-01-13 RX ADMIN — SODIUM CHLORIDE TAB 1 GM 1 G: 1 TAB at 05:59

## 2020-01-13 RX ADMIN — PIPERACILLIN AND TAZOBACTAM 3.38 G: 3; .375 INJECTION, POWDER, LYOPHILIZED, FOR SOLUTION INTRAVENOUS; PARENTERAL at 20:56

## 2020-01-13 RX ADMIN — PIPERACILLIN AND TAZOBACTAM 3.38 G: 3; .375 INJECTION, POWDER, LYOPHILIZED, FOR SOLUTION INTRAVENOUS; PARENTERAL at 05:45

## 2020-01-13 NOTE — PROGRESS NOTES
Telemetry Shift Summary    Rhythm a fib  HR Range 133-171 up to 195  Ectopy oPVC, rCoup  Measurements -/0.14/-    Per Yady MT    Normal Values  Rhythm SR  HR Range    Measurements 0.12-0.20 / 0.06-0.10  / 0.30-0.52

## 2020-01-13 NOTE — DISCHARGE PLANNING
Anticipated Discharge Disposition: TBD    Action: LSW called [facility] to see if they would take pt back with hospice. [facility] did confirm that they would take her back with hospice.     Barriers to Discharge: none    Plan: LSW will continue to assess for any discharge needs.

## 2020-01-13 NOTE — PROGRESS NOTES
"2045 Daughter Claudine (POA) at the bedside, discussing POC with Dr. Bryant over the phone.     2100 Family at the bedside. Discussed amio with daughter, Claudine. She wanted to wait for her sister, Carleen, to arrive and discuss POC before I started the med.    2220 Daughters discussed POC and stated, \"We want nature to take its course.\" They agreed to hold off on giving the amio. Discussed what could happen if we did not give this med. Verbalized understanding. Agreed to discuss possible comfort care/EOL options with Dr. Callaway in the morning and do our best to keep the patient comfortable overnight.     2236 Jeannine from Lab called with critical result of lactic 4.4 at 2236. Critical lab result read back to Jeannine. Dr. Nunez paged at 2246 notified of critical lab result at 2300.  Critical lab result read back by Dr. Nunez. Discussed EF and current POC, no new orders received.     2238 Pt given tylenol PO for comfort, vomited shortly after.   "

## 2020-01-13 NOTE — PROGRESS NOTES
Telemetry Shift Summary    Rhythm SR/ST w/ LBBB  HR Range 90s-100s  Ectopy Occ PVCs, Occ PACs, Rare Couplets  4 beats of VT @ 1600  Measurements .12/.16/.42        Normal Values  Rhythm SR  HR Range    Measurements 0.12-0.20 / 0.06-0.10  / 0.30-0.52

## 2020-01-13 NOTE — PROGRESS NOTES
Assisting with pt care. 20ga IV inserted to L AC. Flushes with ease, patency verified with 20mL NS flushes. Unable to draw back blood. No pain, erythema, or edema at site. Primary RN notified of new IV access.

## 2020-01-13 NOTE — PROGRESS NOTES
I was called to the bedside emergently for this patient by the RN tonight.     She is hypotensive and having chest pain, vomiting and delirium. Her Heart rate is now in the 170 range. Her blood pressure is in the 50s-70s systolic.  I reviewed her EKG nd echocardiogram. She is in afib with a LBBB. I called and discussed the patient with cardiology who have recommended an amiodarone load and drip. This patient has an extremely high mortality risk. I then called and  Discussed the patient's condition with her daughter on the phoine. She will come in but again restated that no aggressive measures such as cardioversion are wanted in this situation. I have initiated an amiodarone bolus and drip with instruction to repeat a bolus every hour for 2 more doses tonight.     Echocardiography Laboratory     CONCLUSIONS  No prior study is available for comparison.   Patient noted to be tachycardic during exam, heart rate 110 bpm.  Severely reduced left ventricular systolic function.  Left ventricular ejection fraction is visually estimated to be 20-25%.  Global hypokinesis with dyskinesis of the entire anterior septum.  Diastolic function is difficult to assess with atrial fibrillation.  Reduced right ventricular systolic function.  Severely dilated left atrium.  Severe mitral annular calcification with thickening of the mitral valve   leaflets, suspicious for rheumatic heart disease with calcification of   the commissures.  Moderate to severe mitral stenosis: Mean transvalvular gradient is 13    mmHg at a heart rate of  107  BPM, increased gradient likely partially   due to tachycardia.  Moderate to severe mitral regurgitation.  Calcified aortic valve leaflets.  Moderate aortic insufficiency.  Moderate tricuspid regurgitation.  Right ventricular systolic pressure is estimated to be 60 mmHg, severe   secondary pulmonary hypertension.  Dilated inferior vena cava with inspiratory collapse.  Findings tiger texted to Dr. Callaway at  time of report.    Critical care time with this patient is from 750-823pm. No overlap

## 2020-01-13 NOTE — THERAPY
Pt continues to be medically unstable at this time due to elevated HR and unstable BP. RN requesting to hold therapy session today, will re-attempt as approprirate.

## 2020-01-13 NOTE — DIETARY
Nutrition Services: Per nurse, pt and pt's family are requesting Boost supplements. No recorded PO intake noted. Per nurse, pt is eating poorly due to presence of nausea. She drank a Boost supplement this morning at breakfast but ate little of her breakfast. Pt sleeping during RD visit. Family present. They said that pt likes the Boost Plus but she has some trouble with dairy. The doctor told them that she may tolerate Magic Cup better. Family would still like pt to receive the Boost supplement because she likes it. Boost Plus will be provided with breakfast and Magic Cup will be provided with lunch and dinner. Pt does not like chocolate. Referral has been sent to hospice. Per nurse, plan currently is for pt to D/C with hospice back to [facility] where her  lives. RD will monitor for either initiation of hospice or PO intake.

## 2020-01-13 NOTE — DISCHARGE PLANNING
Anticipated Discharge Disposition: TBD    Action: LSW got choice from pt's daughter for hospice consult. LSW faxed over choice form to [facility] [phone].     Barriers to Discharge: none    Plan: LSW will continue to assess for any discharge needs.

## 2020-01-13 NOTE — CARE PLAN
Problem: Communication  Goal: The ability to communicate needs accurately and effectively will improve  Outcome: PROGRESSING AS EXPECTED  Note:   Discussed POC and EOL options with family. Will re-address with doctor in the morning.      Problem: Safety  Goal: Will remain free from injury  Outcome: PROGRESSING AS EXPECTED  Note:   Bed alarm on and call light/ personal belongings within reach.

## 2020-01-13 NOTE — PROGRESS NOTES
Received report from KALEIGH Teague. Pt is alert and oriented x2. Pt continues to have low Bps. Safety measures in place, care assumed.

## 2020-01-13 NOTE — PROGRESS NOTES
Report from NOC RN POC and orders reviewed. Pt sleeping granddaughter called and will be here at bedside shortly. Blood Pressure soft Dr aware and no interventions ordered per daughters. Purewick in place Pt on O2 2liters. Assessment completed.

## 2020-01-13 NOTE — DISCHARGE PLANNING
ATTN: Case Management   RE: Referral for Hospice    [facility] A nurse liaison will be in contact with the patient and family to assess for Hospice appropriateness. For immediate assistance, please call [phone] to speak to a member of our intake team.

## 2020-01-13 NOTE — ASSESSMENT & PLAN NOTE
New overnight the patient went into rapid atrial fibrillation. Amiodarone was ordered but this morning I met with family at the bedside who have asked to stop this medication as the patient has such low blood pressure and difficulty breathing they do not want any lung toxicity or farther lowering of her blood pressure and instead are asking about hospice services

## 2020-01-13 NOTE — PROGRESS NOTES
Monitor tech notified RN of HR of 180s. Pt had just pulled out her patel catheter. HR trending in 130s-150s on monitor. MD notified. EKG ordered.

## 2020-01-13 NOTE — DISCHARGE PLANNING
Received Choice form at [phone]  Agency/Facility Name: [facility], [facility], [facility]  Referral sent per Choice form @ [phone]

## 2020-01-13 NOTE — RESPIRATORY CARE
COPD EDUCATION by COPD CLINICAL EDUCATOR  1/13/2020 at 7:56 AM by Kierra Parrish     Patient reviewed by COPD education team. Patient does not have a history or diagnosis of COPD and is a non-smoker, therefore does not qualify for the COPD program.

## 2020-01-13 NOTE — THERAPY
Occupational Therapy-  Pt on service with therapy, however will hold today as pt appears medically fragile.  Will follow for plan of care to see if pt/family change plan to comfort care.

## 2020-01-14 ENCOUNTER — HOME CARE VISIT (OUTPATIENT)
Dept: HOSPICE | Facility: HOSPICE | Age: 85
End: 2020-01-14
Payer: MEDICARE

## 2020-01-14 LAB
ANION GAP SERPL CALC-SCNC: 18 MMOL/L (ref 0–11.9)
BASOPHILS # BLD AUTO: 0.4 % (ref 0–1.8)
BASOPHILS # BLD: 0.05 K/UL (ref 0–0.12)
BUN SERPL-MCNC: 48 MG/DL (ref 8–22)
CALCIUM SERPL-MCNC: 7.9 MG/DL (ref 8.4–10.2)
CHLORIDE SERPL-SCNC: 103 MMOL/L (ref 96–112)
CO2 SERPL-SCNC: 17 MMOL/L (ref 20–33)
CREAT SERPL-MCNC: 1.3 MG/DL (ref 0.5–1.4)
EOSINOPHIL # BLD AUTO: 0.17 K/UL (ref 0–0.51)
EOSINOPHIL NFR BLD: 1.5 % (ref 0–6.9)
ERYTHROCYTE [DISTWIDTH] IN BLOOD BY AUTOMATED COUNT: 49.8 FL (ref 35.9–50)
GLUCOSE SERPL-MCNC: 109 MG/DL (ref 65–99)
HCT VFR BLD AUTO: 22.3 % (ref 37–47)
HGB BLD-MCNC: 7.3 G/DL (ref 12–16)
IMM GRANULOCYTES # BLD AUTO: 0.24 K/UL (ref 0–0.11)
IMM GRANULOCYTES NFR BLD AUTO: 2.1 % (ref 0–0.9)
LYMPHOCYTES # BLD AUTO: 2.57 K/UL (ref 1–4.8)
LYMPHOCYTES NFR BLD: 22 % (ref 22–41)
MCH RBC QN AUTO: 32.3 PG (ref 27–33)
MCHC RBC AUTO-ENTMCNC: 32.7 G/DL (ref 33.6–35)
MCV RBC AUTO: 98.7 FL (ref 81.4–97.8)
MONOCYTES # BLD AUTO: 0.72 K/UL (ref 0–0.85)
MONOCYTES NFR BLD AUTO: 6.2 % (ref 0–13.4)
NEUTROPHILS # BLD AUTO: 7.94 K/UL (ref 2–7.15)
NEUTROPHILS NFR BLD: 67.8 % (ref 44–72)
NRBC # BLD AUTO: 0.2 K/UL
NRBC BLD-RTO: 1.7 /100 WBC
PLATELET # BLD AUTO: 102 K/UL (ref 164–446)
PMV BLD AUTO: 12 FL (ref 9–12.9)
POTASSIUM SERPL-SCNC: 3.6 MMOL/L (ref 3.6–5.5)
PROCALCITONIN SERPL-MCNC: 0.41 NG/ML
RBC # BLD AUTO: 2.26 M/UL (ref 4.2–5.4)
SODIUM SERPL-SCNC: 138 MMOL/L (ref 135–145)
WBC # BLD AUTO: 11.7 K/UL (ref 4.8–10.8)

## 2020-01-14 PROCEDURE — 700102 HCHG RX REV CODE 250 W/ 637 OVERRIDE(OP): Performed by: HOSPITALIST

## 2020-01-14 PROCEDURE — 700111 HCHG RX REV CODE 636 W/ 250 OVERRIDE (IP): Performed by: INTERNAL MEDICINE

## 2020-01-14 PROCEDURE — 700102 HCHG RX REV CODE 250 W/ 637 OVERRIDE(OP): Performed by: INTERNAL MEDICINE

## 2020-01-14 PROCEDURE — 84145 PROCALCITONIN (PCT): CPT

## 2020-01-14 PROCEDURE — A9270 NON-COVERED ITEM OR SERVICE: HCPCS | Performed by: INTERNAL MEDICINE

## 2020-01-14 PROCEDURE — 700111 HCHG RX REV CODE 636 W/ 250 OVERRIDE (IP): Performed by: HOSPITALIST

## 2020-01-14 PROCEDURE — 700105 HCHG RX REV CODE 258: Performed by: INTERNAL MEDICINE

## 2020-01-14 PROCEDURE — 99233 SBSQ HOSP IP/OBS HIGH 50: CPT | Performed by: INTERNAL MEDICINE

## 2020-01-14 PROCEDURE — 770020 HCHG ROOM/CARE - TELE (206)

## 2020-01-14 PROCEDURE — 80048 BASIC METABOLIC PNL TOTAL CA: CPT

## 2020-01-14 PROCEDURE — A9270 NON-COVERED ITEM OR SERVICE: HCPCS | Performed by: HOSPITALIST

## 2020-01-14 PROCEDURE — 85025 COMPLETE CBC W/AUTO DIFF WBC: CPT

## 2020-01-14 RX ADMIN — ACETAMINOPHEN 650 MG: 325 TABLET, FILM COATED ORAL at 07:50

## 2020-01-14 RX ADMIN — METHENAMINE HIPPURATE 1 G: 1 TABLET ORAL at 05:17

## 2020-01-14 RX ADMIN — MIDODRINE HYDROCHLORIDE 5 MG: 5 TABLET ORAL at 05:15

## 2020-01-14 RX ADMIN — FLUTICASONE PROPIONATE 100 MCG: 50 SPRAY, METERED NASAL at 05:13

## 2020-01-14 RX ADMIN — KETOROLAC TROMETHAMINE 15 MG: 30 INJECTION, SOLUTION INTRAMUSCULAR at 16:05

## 2020-01-14 RX ADMIN — PIPERACILLIN AND TAZOBACTAM 3.38 G: 3; .375 INJECTION, POWDER, LYOPHILIZED, FOR SOLUTION INTRAVENOUS; PARENTERAL at 20:48

## 2020-01-14 RX ADMIN — PENTOSAN POLYSULFATE SODIUM 100 MG: 100 CAPSULE, GELATIN COATED ORAL at 05:17

## 2020-01-14 RX ADMIN — PIPERACILLIN AND TAZOBACTAM 3.38 G: 3; .375 INJECTION, POWDER, LYOPHILIZED, FOR SOLUTION INTRAVENOUS; PARENTERAL at 14:44

## 2020-01-14 RX ADMIN — GABAPENTIN 100 MG: 100 CAPSULE ORAL at 05:14

## 2020-01-14 RX ADMIN — SENNOSIDES AND DOCUSATE SODIUM 2 TABLET: 8.6; 5 TABLET ORAL at 18:11

## 2020-01-14 RX ADMIN — POTASSIUM CHLORIDE 10 MEQ: 20 TABLET, EXTENDED RELEASE ORAL at 18:09

## 2020-01-14 RX ADMIN — OXCARBAZEPINE 300 MG: 300 TABLET, FILM COATED ORAL at 05:18

## 2020-01-14 RX ADMIN — ENOXAPARIN SODIUM 40 MG: 100 INJECTION SUBCUTANEOUS at 05:13

## 2020-01-14 RX ADMIN — BUDESONIDE AND FORMOTEROL FUMARATE DIHYDRATE 2 PUFF: 160; 4.5 AEROSOL RESPIRATORY (INHALATION) at 18:14

## 2020-01-14 RX ADMIN — MIDODRINE HYDROCHLORIDE 5 MG: 5 TABLET ORAL at 18:08

## 2020-01-14 RX ADMIN — FAMOTIDINE 20 MG: 20 TABLET ORAL at 05:18

## 2020-01-14 RX ADMIN — ONDANSETRON 4 MG: 2 INJECTION INTRAMUSCULAR; INTRAVENOUS at 07:50

## 2020-01-14 RX ADMIN — BUDESONIDE AND FORMOTEROL FUMARATE DIHYDRATE 2 PUFF: 160; 4.5 AEROSOL RESPIRATORY (INHALATION) at 05:11

## 2020-01-14 RX ADMIN — SENNOSIDES AND DOCUSATE SODIUM 2 TABLET: 8.6; 5 TABLET ORAL at 05:15

## 2020-01-14 RX ADMIN — DIVALPROEX SODIUM 1000 MG: 250 TABLET, FILM COATED, EXTENDED RELEASE ORAL at 20:47

## 2020-01-14 RX ADMIN — FAMOTIDINE 20 MG: 20 TABLET ORAL at 18:11

## 2020-01-14 RX ADMIN — PIPERACILLIN AND TAZOBACTAM 3.38 G: 3; .375 INJECTION, POWDER, LYOPHILIZED, FOR SOLUTION INTRAVENOUS; PARENTERAL at 05:07

## 2020-01-14 RX ADMIN — POTASSIUM CHLORIDE 10 MEQ: 20 TABLET, EXTENDED RELEASE ORAL at 05:07

## 2020-01-14 ASSESSMENT — PATIENT HEALTH QUESTIONNAIRE - PHQ9
2. FEELING DOWN, DEPRESSED, IRRITABLE, OR HOPELESS: NOT AT ALL
1. LITTLE INTEREST OR PLEASURE IN DOING THINGS: NOT AT ALL
SUM OF ALL RESPONSES TO PHQ9 QUESTIONS 1 AND 2: 0

## 2020-01-14 ASSESSMENT — PAIN SCALES - PAIN ASSESSMENT IN ADVANCED DEMENTIA (PAINAD)
BODYLANGUAGE: RELAXED
FACIALEXPRESSION: SMILING OR INEXPRESSIVE
BREATHING: NORMAL
NEGVOCALIZATION: OCCASIONAL MOAN/GROAN, LOW SPEECH, NEGATIVE/DISAPPROVING QUALITY
CONSOLABILITY: NO NEED TO CONSOLE
TOTALSCORE: 1

## 2020-01-14 NOTE — CARE PLAN
Problem: Communication  Goal: The ability to communicate needs accurately and effectively will improve  Outcome: PROGRESSING SLOWER THAN EXPECTED  Note:   Discussed POC for the night, no family at bedside, pt is fatigued.      Problem: Venous Thromboembolism (VTW)/Deep Vein Thrombosis (DVT) Prevention:  Goal: Patient will participate in Venous Thrombosis (VTE)/Deep Vein Thrombosis (DVT)Prevention Measures  Outcome: PROGRESSING AS EXPECTED  Note:   SCDs on, lovenox ordered.

## 2020-01-14 NOTE — THERAPY
PT Contact Note:    pt agreeable to PT, however once initiated, she cried out in pain, stating it was too much and that she didn't feel she could participate any longer. Only able to scoot legs toward EOB before requesting to stop. Once repositioned her pain subsided and she stated she was comfortable. Not able to tolerate treatment at this time. Case discussed with her RN.    Berta Martines, PT

## 2020-01-14 NOTE — DISCHARGE PLANNING
Anticipated Discharge Disposition: TBD     Action: LSW called daughter, Claudine, to get an update on what hospice they would like. Claudine reported that the family was going to have a phone conference with [facility] tomorrow morning. Claudine stated that the family was going to choose [facility].     Barriers to Discharge: none    Plan: LSW will continue to assess for any discharge needs.

## 2020-01-14 NOTE — PROGRESS NOTES
Telemetry Shift Summary    Rhythm a fib with BBB  HR Range 101-124 up to 183  Ectopy o PVC  Measurements -/0.14/-    Per Shakira MT    Normal Values  Rhythm SR  HR Range    Measurements 0.12-0.20 / 0.06-0.10  / 0.30-0.52

## 2020-01-14 NOTE — THERAPY
Occupational Therapy-  Pt not tolerating therapy due to pain.  Hospice consult pending.  OT will follow pt for needs depending on plan of care.

## 2020-01-14 NOTE — PROGRESS NOTES
[facility] Medicine Daily Progress Note    Date of Service  1/14/2020    Chief Complaint  89 y.o. female admitted 1/8/2020 with left hip and arm pain.    Hospital Course    Patient had fall at [facility] and sustained fracture to left humerus and left hip.  She reached for her walker and felt dizzy and fell over.  She has a history of vertigo and orthostatic hypotension.        Interval Problem Update  Patient without significant improvement since family meeting held yesterday, hospice consultation placed and reportedly family deciding how to proceed.  Patient with emesis in the suction canister and did not wake to examination.      Consultants/Specialty  Ortho - Rice    Code Status  DNR, hospice consult placed    Disposition  Tbd, family considering hospice back at [facility].    Review of Systems  Review of Systems   Unable to perform ROS: Medical condition        Physical Exam  Temp:  [36.3 °C (97.3 °F)-36.6 °C (97.9 °F)] 36.4 °C (97.5 °F)  Pulse:  [] 60  Resp:  [18-20] 20  BP: (104-118)/(49-89) 118/49  SpO2:  [92 %-100 %] 92 %    Physical Exam  Vitals signs and nursing note reviewed.   Constitutional:       General: She is not in acute distress.     Appearance: Normal appearance. She is not ill-appearing.   HENT:      Head: Normocephalic and atraumatic.      Nose: Nose normal.   Neck:      Musculoskeletal: Neck supple.   Cardiovascular:      Rate and Rhythm: Normal rate and regular rhythm.      Heart sounds: Normal heart sounds. No murmur.   Pulmonary:      Effort: Pulmonary effort is normal.      Breath sounds: Normal breath sounds.   Abdominal:      General: Bowel sounds are normal. There is no distension.      Palpations: Abdomen is soft.   Musculoskeletal:         General: No swelling or tenderness.   Skin:     General: Skin is warm and dry.   Neurological:      Mental Status: She is alert.         Fluids    Intake/Output Summary (Last 24 hours) at 1/14/2020 1051  Last data filed at  1/14/2020 0507  Gross per 24 hour   Intake 200 ml   Output 150 ml   Net 50 ml       Laboratory  Recent Labs     01/11/20 2307 01/12/20 0338 01/14/20  0415   WBC 14.2* 15.7* 11.7*   RBC 2.73* 2.32* 2.26*   HEMOGLOBIN 8.9* 7.5* 7.3*   HEMATOCRIT 26.0* 21.6* 22.3*   MCV 95.2 93.1 98.7*   MCH 32.6 32.3 32.3   MCHC 34.2 34.7 32.7*   RDW 50.0 48.9 49.8   PLATELETCT 105* 87* 102*   MPV 10.9 10.8 12.0     Recent Labs     01/11/20 2307 01/12/20 0338 01/14/20 0415   SODIUM 139 137 138   POTASSIUM 3.6 3.4* 3.6   CHLORIDE 101 102 103   CO2 20 20 17*   GLUCOSE 160* 151* 109*   BUN 22 22 48*   CREATININE 0.57 0.61 1.30   CALCIUM 8.4 8.2* 7.9*                   Imaging  EC-ECHOCARDIOGRAM COMPLETE W/O CONT   Final Result      DX-CHEST-PORTABLE (1 VIEW)   Final Result      Improving right upper lobe consolidation      Otherwise stable chest with bibasilar atelectasis, probable pleural fluid and some consolidation likely      DX-CHEST-PORTABLE (1 VIEW)   Final Result      1.  Enlarged cardiac silhouette with changes of interstitial edema.   2.  Ill-defined left lower lobe and right upper lobe opacities could be due to edema, atelectasis or pneumonitis.      DX-HIP-UNILATERAL-WITH PELVIS-1 VIEW LEFT   Final Result      Status post left hip hemiarthroplasty.      DX-HUMERUS 2+ LEFT   Final Result      Comminuted impacted left humeral neck fracture.      DX-SHOULDER 2+ LEFT   Final Result      Comminuted mildly angulated and impacted left humeral neck fracture.      DX-CHEST-LIMITED (1 VIEW)   Final Result      No acute cardiopulmonary abnormality.      DX-FEMUR-2+ LEFT   Final Result      1.  Acute left transcervical/basicervical femoral neck fracture.   2.  Osteopenia.      DX-PELVIS-1 OR 2 VIEWS   Final Result      1.  Acute left transcervical/basicervical femoral neck fracture.   2.  Osteopenia.      CT-HEAD W/O   Final Result      1.  Chronic ischemic changes.   2.  No acute intracranial abnormality.            Assessment/Plan  * Hip fracture (HCC)  Assessment & Plan  Pain control with oxycodone as needed  Left hip surgical repair done 1/9  Patient unable to work with PT due to hypotension  Hospice referral placed      Paroxysmal atrial fibrillation (HCC)  Assessment & Plan  New overnight the patient went into rapid atrial fibrillation. Amiodarone was ordered but this morning I met with family at the bedside who have asked to stop this medication as the patient has such low blood pressure and difficulty breathing they do not want any lung toxicity or farther lowering of her blood pressure and instead are asking about hospice services    Acute respiratory failure with hypoxia ([facility])  Assessment & Plan  Pulmonary edema on chest xray  Lasix given with improved work of breathing  However the patient developed hypotension with systolic blood pressure down to 64 that responded partially to fluids  Echocardiogram shows EF 20-25% and mitral valve dysfunction that is moderate to severe, I discussed this with cardiology, Dr. Sanz who recommends hospice    Acute blood loss as cause of postoperative anemia  Assessment & Plan  Blood transfused, hemoglobin is lower today, will follow trend    Metabolic acidosis  Assessment & Plan  Treated with fluids with improvement  Continue antibiotics    Humerus fracture  Assessment & Plan  Pain control    Passive and active assisted range of motion and non weight bearing to the injured extremity.   Will need repeat xray and follow up with Dr. Rice in two weeks should she not dc on hospice.    Idiopathic hypotension- (present on admission)  Assessment & Plan  Midodrine as patient has chronic hypotension  Worsened hypotension is due to sepsis now improved to her baseline    Sepsis ([facility])  Assessment & Plan  This is Sepsis Not present on admission  SIRS criteria identified on my evaluation include: Fever, with temperature greater than 101 deg F, Tachycardia, with heart rate greater than 90  BPM, Tachypnea, with respirations greater than 20 per minute and Leukocyosis, with WBC greater than 12,000  Source is pneumonia  Sepsis protocol initiated  Fluid resuscitation ordered per protocol  IV antibiotics as appropriate for source of sepsis  Continue zosyn for now  Family deciding on comfort care vs hospice        Seizure disorder (HCC)- (present on admission)  Assessment & Plan  Controlled on depakote, trileptal and neurontin, will continue meds    Left bundle branch block- (present on admission)  Assessment & Plan  Chronic and unchanged on ekgs    Vertigo- (present on admission)  Assessment & Plan  Treat as needed           VTE prophylaxis: lovenox

## 2020-01-14 NOTE — PROGRESS NOTES
"Blood pressure and heart rate is still been the limiting factor and working with therapy.  Discussed case with current RN.  Still considering hospice options for discharge planning.    Patient is awake receiving meds this morning and has no complaints.  She says she has no pain at the moment.  Denies any new numbness or tingling.    /57   Pulse (!) 138   Temp 36.3 °C (97.3 °F) (Axillary)   Resp 18   Ht 1.702 m (5' 7\")   Wt 88.9 kg (195 lb 15.8 oz)   SpO2 99% '    Dressing to left hip with some mild serosanguineous drainage since surgery.  Sling in place to left upper extremity.  Edema present throughout.  Mild shortness of breath but able to carry on conversation.      Plan:  Weightbearing as tolerated to left lower extremity nonweightbearing left upper extremity  Physical and Occupational Therapy when able  Discharge planning to hospice versus skilled nursing facility  DVT prophylaxis with SCDs and chemoprophylaxis if no contraindication  Mobilize to chair if able.  "

## 2020-01-14 NOTE — DISCHARGE PLANNING
Care Transition Team Assessment    Information Source  Orientation : Oriented x 4  Information Given By: Patient  Who is responsible for making decisions for patient? : Patient    Readmission Evaluation  Is this a readmission?: Yes - unplanned readmission    Elopement Risk  Legal Hold: No  Ambulatory or Self Mobile in Wheelchair: No-Not an Elopement Risk  Elopement Risk: Not at Risk for Elopement    Interdisciplinary Discharge Planning  Does Admitting Nurse Feel This Could be a Complex Discharge?: No  Lives with - Patient's Self Care Capacity: Spouse, Attendant / Paid Care Giver  Patient or legal guardian wants to designate a caregiver (see row info): No  Support Systems: [employer], Family Member(s), Friends / Neighbors  Housing / Facility: Assisted Living Residence([facility])  Do You Take your Prescribed Medications Regularly: Yes  Able to Return to Previous ADL's: Future Time w/Therapy  Mobility Issues: Yes  Prior Services: Continuous (24 Hour) Care Giving Per Service  Assistance Needed: No  Durable Medical Equipment: Not Applicable    Discharge Preparedness  What is your plan after discharge?: Other (comment)([facility])  What are your discharge supports?: Child, Spouse, Other (comment)([facility])  Prior Functional Level: Needs Assist with Activities of Daily Living, Needs Assist with Medication Management, Uses Walker    Functional Assesment  Prior Functional Level: Needs Assist with Activities of Daily Living, Needs Assist with Medication Management, Uses Walker    Finances  Financial Barriers to Discharge: No  Prescription Coverage: Yes    Vision / Hearing Impairment  Vision Impairment : No  Right Eye Vision: Wears Glasses  Left Eye Vision: Wears Glasses  Hearing Impairment : No         Advance Directive  Advance Directive?: DPOA for Health Care  Durable Power of  Name and Contact : (padam vanessa [phone])    Domestic Abuse  Have you ever been the victim of abuse or violence?:  No  Physical Abuse or Sexual Abuse: No  Verbal Abuse or Emotional Abuse: No  Possible Abuse Reported to:: Not Applicable    Psychological Assessment  History of Substance Abuse: None  History of Psychiatric Problems: No  Non-compliant with Treatment: No    Discharge Risks or Barriers  Discharge risks or barriers?: No  Patient risk factors: Vulnerable adult    Anticipated Discharge Information  Anticipated discharge disposition: Assisted living, Hospice  Discharge Address: ([id number] [facility] [address])

## 2020-01-14 NOTE — CARE PLAN
Problem: Safety  Goal: Will remain free from injury  Outcome: PROGRESSING AS EXPECTED   Orientated patient to call light, pt able to demonstrate use of call light, bed is locked and alarm on  Problem: Knowledge Deficit  Goal: Knowledge of disease process/condition, treatment plan, diagnostic tests, and medications will improve  Outcome: PROGRESSING AS EXPECTED   Provided time to discuss POC with patient and family at bedside, family waiting to talk to hospices for d/c

## 2020-01-14 NOTE — PROGRESS NOTES
Received report from KALEIGH French. Patient is awake, Pt resting comfortably in bed, plan of care discussed with patient, declines any needs at this time and denies pain. Call light within reach and safety precautions in place.

## 2020-01-14 NOTE — HOSPICE
Hospice met with family and pt, discussed hospice and family is interested in discharge from hospital back to [facility] (home) with hospice.  They will be making a decision in the next day or so.  Dr Beltre approved for community admit.  Hospice will continue to follow for decision on hospice from family.

## 2020-01-14 NOTE — PROGRESS NOTES
Telemetry Shift Summary     Rhythm ST/   Afib   HR Range   Ectopy occ PVCs, occ-freq PACs  Measurements 0/.14/.42           Normal Values  Rhythm SR  HR Range    Measurements 0.12-0.20 / 0.06-0.10  / 0.30-0.52

## 2020-01-15 PROBLEM — R11.2 NAUSEA AND VOMITING: Status: ACTIVE | Noted: 2020-01-15

## 2020-01-15 PROCEDURE — 94640 AIRWAY INHALATION TREATMENT: CPT

## 2020-01-15 PROCEDURE — 770020 HCHG ROOM/CARE - TELE (206)

## 2020-01-15 PROCEDURE — 700101 HCHG RX REV CODE 250: Performed by: INTERNAL MEDICINE

## 2020-01-15 PROCEDURE — 700111 HCHG RX REV CODE 636 W/ 250 OVERRIDE (IP): Performed by: INTERNAL MEDICINE

## 2020-01-15 PROCEDURE — A9270 NON-COVERED ITEM OR SERVICE: HCPCS | Performed by: HOSPITALIST

## 2020-01-15 PROCEDURE — 97530 THERAPEUTIC ACTIVITIES: CPT

## 2020-01-15 PROCEDURE — 99232 SBSQ HOSP IP/OBS MODERATE 35: CPT | Performed by: INTERNAL MEDICINE

## 2020-01-15 PROCEDURE — 700102 HCHG RX REV CODE 250 W/ 637 OVERRIDE(OP): Performed by: INTERNAL MEDICINE

## 2020-01-15 PROCEDURE — 700111 HCHG RX REV CODE 636 W/ 250 OVERRIDE (IP): Performed by: HOSPITALIST

## 2020-01-15 PROCEDURE — 94760 N-INVAS EAR/PLS OXIMETRY 1: CPT

## 2020-01-15 PROCEDURE — 700105 HCHG RX REV CODE 258: Performed by: INTERNAL MEDICINE

## 2020-01-15 PROCEDURE — A9270 NON-COVERED ITEM OR SERVICE: HCPCS | Performed by: INTERNAL MEDICINE

## 2020-01-15 PROCEDURE — 700102 HCHG RX REV CODE 250 W/ 637 OVERRIDE(OP): Performed by: HOSPITALIST

## 2020-01-15 RX ADMIN — FAMOTIDINE 20 MG: 20 TABLET ORAL at 18:21

## 2020-01-15 RX ADMIN — BUDESONIDE AND FORMOTEROL FUMARATE DIHYDRATE 2 PUFF: 160; 4.5 AEROSOL RESPIRATORY (INHALATION) at 18:25

## 2020-01-15 RX ADMIN — BUDESONIDE AND FORMOTEROL FUMARATE DIHYDRATE 2 PUFF: 160; 4.5 AEROSOL RESPIRATORY (INHALATION) at 05:11

## 2020-01-15 RX ADMIN — GABAPENTIN 100 MG: 100 CAPSULE ORAL at 05:16

## 2020-01-15 RX ADMIN — KETOROLAC TROMETHAMINE 15 MG: 30 INJECTION, SOLUTION INTRAMUSCULAR at 09:25

## 2020-01-15 RX ADMIN — KETOROLAC TROMETHAMINE 15 MG: 30 INJECTION, SOLUTION INTRAMUSCULAR at 21:25

## 2020-01-15 RX ADMIN — OXCARBAZEPINE 300 MG: 300 TABLET, FILM COATED ORAL at 05:22

## 2020-01-15 RX ADMIN — PIPERACILLIN AND TAZOBACTAM 3.38 G: 3; .375 INJECTION, POWDER, LYOPHILIZED, FOR SOLUTION INTRAVENOUS; PARENTERAL at 05:11

## 2020-01-15 RX ADMIN — ACETAMINOPHEN 650 MG: 325 TABLET, FILM COATED ORAL at 18:28

## 2020-01-15 RX ADMIN — MIDODRINE HYDROCHLORIDE 5 MG: 5 TABLET ORAL at 18:21

## 2020-01-15 RX ADMIN — MIDODRINE HYDROCHLORIDE 5 MG: 5 TABLET ORAL at 05:22

## 2020-01-15 RX ADMIN — ENOXAPARIN SODIUM 40 MG: 100 INJECTION SUBCUTANEOUS at 05:11

## 2020-01-15 RX ADMIN — FAMOTIDINE 20 MG: 20 TABLET ORAL at 05:15

## 2020-01-15 RX ADMIN — ALBUTEROL SULFATE 2.5 MG: 2.5 SOLUTION RESPIRATORY (INHALATION) at 20:57

## 2020-01-15 RX ADMIN — PENTOSAN POLYSULFATE SODIUM 100 MG: 100 CAPSULE, GELATIN COATED ORAL at 05:16

## 2020-01-15 RX ADMIN — FLUTICASONE PROPIONATE 100 MCG: 50 SPRAY, METERED NASAL at 05:11

## 2020-01-15 RX ADMIN — DIVALPROEX SODIUM 1000 MG: 250 TABLET, FILM COATED, EXTENDED RELEASE ORAL at 21:24

## 2020-01-15 RX ADMIN — METOCLOPRAMIDE 10 MG: 5 INJECTION, SOLUTION INTRAMUSCULAR; INTRAVENOUS at 06:17

## 2020-01-15 RX ADMIN — KETOROLAC TROMETHAMINE 15 MG: 30 INJECTION, SOLUTION INTRAMUSCULAR at 00:34

## 2020-01-15 RX ADMIN — ALBUTEROL SULFATE 2.5 MG: 2.5 SOLUTION RESPIRATORY (INHALATION) at 17:12

## 2020-01-15 RX ADMIN — POTASSIUM CHLORIDE 10 MEQ: 20 TABLET, EXTENDED RELEASE ORAL at 18:21

## 2020-01-15 RX ADMIN — METHENAMINE HIPPURATE 1 G: 1 TABLET ORAL at 05:16

## 2020-01-15 RX ADMIN — POTASSIUM CHLORIDE 10 MEQ: 20 TABLET, EXTENDED RELEASE ORAL at 05:22

## 2020-01-15 ASSESSMENT — ENCOUNTER SYMPTOMS
NAUSEA: 1
HEADACHES: 1
VOMITING: 1
DIZZINESS: 1
SHORTNESS OF BREATH: 1
CHANGE IN LEVEL OF CONSCIOUSNESS: 1

## 2020-01-15 ASSESSMENT — COGNITIVE AND FUNCTIONAL STATUS - GENERAL
SUGGESTED CMS G CODE MODIFIER DAILY ACTIVITY: CM
STANDING UP FROM CHAIR USING ARMS: TOTAL
MOVING TO AND FROM BED TO CHAIR: UNABLE
WALKING IN HOSPITAL ROOM: TOTAL
DRESSING REGULAR UPPER BODY CLOTHING: TOTAL
MOVING FROM LYING ON BACK TO SITTING ON SIDE OF FLAT BED: UNABLE
EATING MEALS: A LOT
MOBILITY SCORE: 6
TOILETING: TOTAL
TURNING FROM BACK TO SIDE WHILE IN FLAT BAD: UNABLE
HELP NEEDED FOR BATHING: TOTAL
PERSONAL GROOMING: TOTAL
DRESSING REGULAR LOWER BODY CLOTHING: TOTAL
DAILY ACTIVITIY SCORE: 7
SUGGESTED CMS G CODE MODIFIER MOBILITY: CN
CLIMB 3 TO 5 STEPS WITH RAILING: TOTAL

## 2020-01-15 ASSESSMENT — PAIN SCALES - PAIN ASSESSMENT IN ADVANCED DEMENTIA (PAINAD)
BREATHING: OCCASIONAL LABORED BREATHING, SHORT PERIOD OF HYPERVENTILATION
NEGVOCALIZATION: OCCASIONAL MOAN/GROAN, LOW SPEECH, NEGATIVE/DISAPPROVING QUALITY
FACIALEXPRESSION: SMILING OR INEXPRESSIVE
CONSOLABILITY: NO NEED TO CONSOLE
BODYLANGUAGE: RELAXED
TOTALSCORE: 2

## 2020-01-15 ASSESSMENT — LIFESTYLE VARIABLES: EVER_SMOKED: UNABLE TO EVALUATE AT THIS TIME - NEEDS ASSESSMENT PRIOR TO DISCHARGE

## 2020-01-15 ASSESSMENT — ACTIVITIES OF DAILY LIVING (ADL)
CONTINENCE_REQUIRES_ASSISTANCE: 1
AMBULATION_REQUIRES_ASSISTANCE: 1
EATING_REQUIRES_ASSISTANCE: 1
DRESSING_REQUIRES_ASSISTANCE: 1
PHYSICAL_TRANSFER_REQUIRES_ASSISTANCE: 1
BATHING_REQUIRES_ASSISTANCE: 1

## 2020-01-15 ASSESSMENT — GAIT ASSESSMENTS: GAIT LEVEL OF ASSIST: UNABLE TO PARTICIPATE

## 2020-01-15 NOTE — THERAPY
"Occupational Therapy Treatment completed with focus on ADLs, ADL transfers and caregiver training.  Functional Status:  Per notes and family, plan is for pt to d/c back to [facility] tomorrow with Hospice care.  Pt is not on comfort care.  Met with family, educated on role of therapy and asked if family wanted therapy to see pt or to let her rest.  Family stated they'd like her to be able to get OOB to a chair for quality of life at home, and they asked pt if she wanted to get up and she was agreeable.  Pt required maxA x2 for supine to sit, maxA to scoot to EOB.  Once feet on floor, pt initially required min to modA to EOB sitting balance, but then progressed to CGA for sitting balance.  Pt was doing some slight leg kicks with BLE, and using RUE to support herself at the end.  She was looking up periodically at family members and rocking gently forward and back in sitting.  Pt tolerated EOB approx 10-12 min then c/o fatigue.  Returned to supine with maxA x2.  Once back in bed, total assist to scoot up.  Pt sounded like she had secretions in back of throat and was having difficulty coughing them up. Wheezing with breathing and increased work of breathing.  RN aware to monitor, pt may need suction to help with thick saliva/secretions.  OT will follow as needed if pt is not discharged tomorrow.   Plan of Care: Will benefit from Occupational Therapy 2 times per week  Discharge Recommendations:  Equipment Will Continue to Assess for Equipment Needs and Hospice will provide what is needed. Post-acute therapy Currently anticipate no further skilled therapy needs once patient is discharged from the inpatient setting as long as she is on service with hospice.  If she begins to feel better, may benefit from HH therapy.    See \"Rehab Therapy-Acute\" Patient Summary Report for complete documentation.   "

## 2020-01-15 NOTE — PROGRESS NOTES
Stopped by but just missed family at bedside per RN.  Therapy still slow today with significant pain limiting.  Continue mobilizing as able and discharge planning

## 2020-01-15 NOTE — PROGRESS NOTES
Patient c/o of back pain gave toradal for pain 6/10.     Reassess patient 1730, patient is sleeping respirations are even and unlabored. Will continue to monitor. Call light in patients hand, bed is locked and alarm is on

## 2020-01-15 NOTE — DISCHARGE PLANNING
Received Choice form at [phone]  Agency/Facility Name: [facility]  Referral sent per Choice form @ [phone]

## 2020-01-15 NOTE — PROGRESS NOTES
[facility] Medicine Daily Progress Note    Date of Service  1/15/2020    Chief Complaint  89 y.o. female admitted 1/8/2020 with left hip and arm pain.    Hospital Course    Patient had fall at [facility] and sustained fracture to left humerus and left hip.  She reached for her walker and felt dizzy and fell over.  She has a history of vertigo and orthostatic hypotension.        Interval Problem Update  1/14 Patient without significant improvement since family meeting held yesterday, hospice consultation placed and reportedly family deciding how to proceed.  Patient with emesis in the suction canister and did not wake to examination.    1/15 Patient called me into her room with request to lower her bed.  She is still having nausea and states she has head pain and feels dizzy.  Upon adjustment of her bed, she feel back to sleep.    Consultants/Specialty  Ortho - Rice    Code Status  DNR, hospice consult placed -awaiting family decision regarding hospice agency.    Disposition  Tbd, family considering hospice back at [facility].    Review of Systems  Review of Systems   Gastrointestinal: Positive for nausea and vomiting.   Neurological: Positive for dizziness and headaches.        Physical Exam  Temp:  [36.3 °C (97.4 °F)-36.8 °C (98.3 °F)] 36.4 °C (97.5 °F)  Pulse:  [108-134] 110  Resp:  [19-20] 19  BP: ()/(50-92) 107/65  SpO2:  [92 %-99 %] 97 %    Physical Exam  Vitals signs and nursing note reviewed.   Constitutional:       General: She is not in acute distress.     Appearance: Normal appearance. She is not ill-appearing.   HENT:      Head: Normocephalic.      Comments: bruising on face       Nose: Nose normal.   Neck:      Musculoskeletal: Neck supple.   Cardiovascular:      Rate and Rhythm: Normal rate and regular rhythm.      Heart sounds: Normal heart sounds. No murmur.   Pulmonary:      Effort: Pulmonary effort is normal.      Breath sounds: Normal breath sounds.   Abdominal:      General:  Bowel sounds are normal. There is no distension.      Palpations: Abdomen is soft.   Musculoskeletal:         General: No swelling or tenderness.   Skin:     General: Skin is warm and dry.      Comments: Left hip bandage in place.  Left arm in sling.     Neurological:      Mental Status: She is alert.      Comments: Oriented to self.           Fluids    Intake/Output Summary (Last 24 hours) at 1/15/2020 0922  Last data filed at 1/14/2020 2319  Gross per 24 hour   Intake 220 ml   Output 400 ml   Net -180 ml       Laboratory  Recent Labs     01/14/20  0415   WBC 11.7*   RBC 2.26*   HEMOGLOBIN 7.3*   HEMATOCRIT 22.3*   MCV 98.7*   MCH 32.3   MCHC 32.7*   RDW 49.8   PLATELETCT 102*   MPV 12.0     Recent Labs     01/14/20  0415   SODIUM 138   POTASSIUM 3.6   CHLORIDE 103   CO2 17*   GLUCOSE 109*   BUN 48*   CREATININE 1.30   CALCIUM 7.9*                   Imaging  EC-ECHOCARDIOGRAM COMPLETE W/O CONT   Final Result      DX-CHEST-PORTABLE (1 VIEW)   Final Result      Improving right upper lobe consolidation      Otherwise stable chest with bibasilar atelectasis, probable pleural fluid and some consolidation likely      DX-CHEST-PORTABLE (1 VIEW)   Final Result      1.  Enlarged cardiac silhouette with changes of interstitial edema.   2.  Ill-defined left lower lobe and right upper lobe opacities could be due to edema, atelectasis or pneumonitis.      DX-HIP-UNILATERAL-WITH PELVIS-1 VIEW LEFT   Final Result      Status post left hip hemiarthroplasty.      DX-HUMERUS 2+ LEFT   Final Result      Comminuted impacted left humeral neck fracture.      DX-SHOULDER 2+ LEFT   Final Result      Comminuted mildly angulated and impacted left humeral neck fracture.      DX-CHEST-LIMITED (1 VIEW)   Final Result      No acute cardiopulmonary abnormality.      DX-FEMUR-2+ LEFT   Final Result      1.  Acute left transcervical/basicervical femoral neck fracture.   2.  Osteopenia.      DX-PELVIS-1 OR 2 VIEWS   Final Result      1.  Acute left  transcervical/basicervical femoral neck fracture.   2.  Osteopenia.      CT-HEAD W/O   Final Result      1.  Chronic ischemic changes.   2.  No acute intracranial abnormality.           Assessment/Plan  * Hip fracture (HCC)  Assessment & Plan  Pain control with oxycodone as needed  Left hip surgical repair done 1/9  Patient unable to work with PT due to hypotension  Hospice referral placed      Nausea and vomiting  Assessment & Plan  Prn anti-emetics      Paroxysmal atrial fibrillation (HCC)  Assessment & Plan  New overnight the patient went into rapid atrial fibrillation. Amiodarone was ordered but this morning I met with family at the bedside who have asked to stop this medication as the patient has such low blood pressure and difficulty breathing they do not want any lung toxicity or farther lowering of her blood pressure and instead are asking about hospice services    Acute respiratory failure with hypoxia (HCC)  Assessment & Plan  Pulmonary edema on chest xray  Lasix given with improved work of breathing  However the patient developed hypotension with systolic blood pressure down to 64 that responded partially to fluids  Echocardiogram shows EF 20-25% and mitral valve dysfunction that is moderate to severe, I discussed this with cardiology, Dr. Sanz who recommends hospice    Acute blood loss as cause of postoperative anemia  Assessment & Plan  Blood transfused, hemoglobin is lower today, will follow trend    Metabolic acidosis  Assessment & Plan  Treated with fluids with improvement  Continue antibiotics    Humerus fracture  Assessment & Plan  Pain control    Passive and active assisted range of motion and non weight bearing to the injured extremity.   Will need repeat xray and follow up with Dr. Rice in two weeks should she not dc on hospice.    Idiopathic hypotension- (present on admission)  Assessment & Plan  Midodrine as patient has chronic hypotension  Worsened hypotension is due to sepsis now  improved to her baseline    Sepsis (HCC)  Assessment & Plan  This is Sepsis Not present on admission  SIRS criteria identified on my evaluation include: Fever, with temperature greater than 101 deg F, Tachycardia, with heart rate greater than 90 BPM, Tachypnea, with respirations greater than 20 per minute and Leukocyosis, with WBC greater than 12,000  Source is pneumonia  Sepsis protocol initiated  Fluid resuscitation ordered per protocol  IV antibiotics as appropriate for source of sepsis  Continue zosyn for now  Family deciding on comfort care vs hospice        Seizure disorder (HCC)- (present on admission)  Assessment & Plan  Controlled on depakote, trileptal and neurontin, will continue meds    Left bundle branch block- (present on admission)  Assessment & Plan  Chronic and unchanged on ekgs    Vertigo- (present on admission)  Assessment & Plan  Treat as needed         VTE prophylaxis: lovenox

## 2020-01-15 NOTE — CARE PLAN
Problem: Communication  Goal: The ability to communicate needs accurately and effectively will improve  Outcome: PROGRESSING AS EXPECTED  Note:   Pt will feel comfortable communicating questions and concerns with treatment team. Will continue to foster this relationship.        Problem: Safety  Goal: Will remain free from injury  Outcome: PROGRESSING AS EXPECTED  Note:   Bed alarm on and personal belongings within reach.

## 2020-01-15 NOTE — DISCHARGE PLANNING
Anticipated Discharge Disposition: TBD     Action: LSW spoke with the daughter,Claudine, to see if they have chosen a hospice. Claudine reported that she was going to call Annika with hospice back with a few questions, then she would let LSW know in the afternoon.     Barriers to Discharge: none    Plan: LSW will continue to assess for any discharge needs.

## 2020-01-15 NOTE — CARE PLAN
Problem: Communication  Goal: The ability to communicate needs accurately and effectively will improve  Outcome: PROGRESSING AS EXPECTED     Problem: Safety  Goal: Will remain free from injury  Outcome: PROGRESSING AS EXPECTED    Ongoing care. No acute issues at this time. Patient resting well with no c/o anything at this time. Will continue care and orders as ordered.

## 2020-01-15 NOTE — PROGRESS NOTES
2000 Assumed care of patient from KALEIGH Kearns. Safety measures in place, care assumed.     2047 Assessment completed.

## 2020-01-15 NOTE — DIETARY
Nutrition Services: Update   Day 7 of admit.  Judit Ramsey is a 89 y.o. female with admitting DX of Hip fracture. DX includes nausea and vomiting, A-fib, acute respiratory failure, sepsis.      Pt is currently on regular diet. PO intake is poor at <25% x 1 and 25-50% x 1. Recorded intake of Boost supplement was < 25%. Family member at bedside during my visit. Pt reports not liking the Boost supplements or Magic Cups. Vanilla ice cream and soup added to menu BID and Chobani Smoothie added to breakfast.     Wt 1/15/20: 89 kg via bed scale - Weight gain noted of 8 kg since admit; however, this may be due to scale error with admit wt taken on standing scale. Current weight is similar to weight taken on 1/13 of 88.9 kg on bed scale.     Malnutrition Risk: criteria not met currently.    Recommendations/Plan:  1. Continue with diet as ordered.  2. D/C Boost Plus and Magic Cups. Provided vanilla ice cream BID and Chobani Smoothie at breakfast.   3. Encourage intake of meals  4. Document intake of all meals as % taken in ADL's to provide interdisciplinary communication across all shifts.   5. Monitor weight.  6. Nutrition rep will continue to see patient for ongoing meal and snack preferences.    RD following

## 2020-01-15 NOTE — PROGRESS NOTES
Telemetry Shift Summary    Rhythm a fib  HR Range 100-135 up to 180  Ectopy oPVC  Measurements -/0.16/-    Per [place]    Normal Values  Rhythm SR  HR Range    Measurements 0.12-0.20 / 0.06-0.10  / 0.30-0.52

## 2020-01-15 NOTE — PROGRESS NOTES
Patient c/o of back pain from laying in bed, repositioned patient and evaluated legs patient refused pain meds at this time. Patient stated that she is more comfortable. Family at bedside, daughter offered lunch to patient and patient ate a little. Safety precautions in place call light in patient's hand.

## 2020-01-15 NOTE — DISCHARGE PLANNING
Tomi Centeno with [facility].  [place] will have a nurse at [facility] tomorrow morning at 10:00am to meet dtr. Pt can arrive after 10:00.

## 2020-01-15 NOTE — PROGRESS NOTES
Patient worked with PT c/o of pain in shoulder and hip. Reposition patient and gave ice bag for hip and shoulder. Will continue to monitor. Safety precautions in place, call light  Within reach.

## 2020-01-16 VITALS
TEMPERATURE: 97.2 F | HEIGHT: 67 IN | RESPIRATION RATE: 16 BRPM | HEART RATE: 88 BPM | WEIGHT: 196.21 LBS | DIASTOLIC BLOOD PRESSURE: 58 MMHG | OXYGEN SATURATION: 97 % | BODY MASS INDEX: 30.8 KG/M2 | SYSTOLIC BLOOD PRESSURE: 111 MMHG

## 2020-01-16 LAB — PROCALCITONIN SERPL-MCNC: 0.28 NG/ML

## 2020-01-16 PROCEDURE — 94640 AIRWAY INHALATION TREATMENT: CPT

## 2020-01-16 PROCEDURE — 700101 HCHG RX REV CODE 250: Performed by: INTERNAL MEDICINE

## 2020-01-16 PROCEDURE — A9270 NON-COVERED ITEM OR SERVICE: HCPCS | Performed by: INTERNAL MEDICINE

## 2020-01-16 PROCEDURE — 700102 HCHG RX REV CODE 250 W/ 637 OVERRIDE(OP): Performed by: HOSPITALIST

## 2020-01-16 PROCEDURE — 700102 HCHG RX REV CODE 250 W/ 637 OVERRIDE(OP): Performed by: INTERNAL MEDICINE

## 2020-01-16 PROCEDURE — 94760 N-INVAS EAR/PLS OXIMETRY 1: CPT

## 2020-01-16 PROCEDURE — 84145 PROCALCITONIN (PCT): CPT

## 2020-01-16 PROCEDURE — 700111 HCHG RX REV CODE 636 W/ 250 OVERRIDE (IP): Performed by: HOSPITALIST

## 2020-01-16 PROCEDURE — A9270 NON-COVERED ITEM OR SERVICE: HCPCS | Performed by: HOSPITALIST

## 2020-01-16 PROCEDURE — 99239 HOSP IP/OBS DSCHRG MGMT >30: CPT | Performed by: INTERNAL MEDICINE

## 2020-01-16 PROCEDURE — 700111 HCHG RX REV CODE 636 W/ 250 OVERRIDE (IP): Performed by: INTERNAL MEDICINE

## 2020-01-16 RX ADMIN — ALBUTEROL SULFATE 2.5 MG: 2.5 SOLUTION RESPIRATORY (INHALATION) at 07:29

## 2020-01-16 RX ADMIN — GABAPENTIN 100 MG: 100 CAPSULE ORAL at 05:14

## 2020-01-16 RX ADMIN — FLUTICASONE PROPIONATE 100 MCG: 50 SPRAY, METERED NASAL at 05:25

## 2020-01-16 RX ADMIN — PENTOSAN POLYSULFATE SODIUM 100 MG: 100 CAPSULE, GELATIN COATED ORAL at 05:24

## 2020-01-16 RX ADMIN — ENOXAPARIN SODIUM 40 MG: 100 INJECTION SUBCUTANEOUS at 05:25

## 2020-01-16 RX ADMIN — FAMOTIDINE 20 MG: 20 TABLET ORAL at 05:14

## 2020-01-16 RX ADMIN — OXCARBAZEPINE 300 MG: 300 TABLET, FILM COATED ORAL at 05:14

## 2020-01-16 RX ADMIN — METHENAMINE HIPPURATE 1 G: 1 TABLET ORAL at 05:15

## 2020-01-16 RX ADMIN — KETOROLAC TROMETHAMINE 15 MG: 30 INJECTION, SOLUTION INTRAMUSCULAR at 04:40

## 2020-01-16 RX ADMIN — POTASSIUM CHLORIDE 10 MEQ: 20 TABLET, EXTENDED RELEASE ORAL at 05:15

## 2020-01-16 RX ADMIN — MIDODRINE HYDROCHLORIDE 5 MG: 5 TABLET ORAL at 05:14

## 2020-01-16 RX ADMIN — BUDESONIDE AND FORMOTEROL FUMARATE DIHYDRATE 2 PUFF: 160; 4.5 AEROSOL RESPIRATORY (INHALATION) at 05:15

## 2020-01-16 NOTE — PROGRESS NOTES
Telemetry Shift Summary    Rhythm:  A-fib converted to SR  HR Range: 105-112.   Ectopy: f-o PVC  Measurements: 0/.14/0          Normal Values  Rhythm SR  HR Range   Measurements 0.12-0.20 / 0.06-0.10 / 0.30-0.52

## 2020-01-16 NOTE — PROGRESS NOTES
Discharged order written, IV removed, tele removed and returned to the monitor tech. Patient discharged back to [facility] with hospice RN and CNA. AVS printed and revieved copied signed and placed on chart. Discharged instructions provided to Family and patient. Family verbalizes understanding. Patent and family states all questions have been answered. Patient and family states that all personal belongings are in possesion. Patient off unit by hospital bed, escorted by SHELLY Felix.

## 2020-01-16 NOTE — DISCHARGE INSTRUCTIONS
Discharge Instructions    Discharged to home by hospital transport:with relative. Discharged via [facility] escort: Yes.  Special equipment needed: [facility]  Be sure to schedule a follow-up appointment with your primary care doctor or any specialists as instructed.     Discharge Plan:   Influenza Vaccine Indication: Not indicated: Previously immunized this influenza season and > 8 years of age    I understand that a diet low in cholesterol, fat, and sodium is recommended for good health. Unless I have been given specific instructions below for another diet, I accept this instruction as my diet prescription.   Other diet: regular    Special Instructions: None    · Is patient discharged on Warfarin / Coumadin?   No     Depression / Suicide Risk    As you are discharged from this [facility] facility, it is important to learn how to keep safe from harming yourself.    Recognize the warning signs:  · Abrupt changes in personality, positive or negative- including increase in energy   · Giving away possessions  · Change in eating patterns- significant weight changes-  positive or negative  · Change in sleeping patterns- unable to sleep or sleeping all the time   · Unwillingness or inability to communicate  · Depression  · Unusual sadness, discouragement and loneliness  · Talk of wanting to die  · Neglect of personal appearance   · Rebelliousness- reckless behavior  · Withdrawal from people/activities they love  · Confusion- inability to concentrate     If you or a loved one observes any of these behaviors or has concerns about self-harm, here's what you can do:  · Talk about it- your feelings and reasons for harming yourself  · Remove any means that you might use to hurt yourself (examples: pills, rope, extension cords, firearm)  · Get professional help from the community (Mental Health, Substance Abuse, psychological counseling)  · Do not be alone:Call your Safe Contact- someone whom you trust who will be there  for you.  · Call your local CRISIS HOTLINE 153-6606 or 674-280-8127  · Call your local Children's Mobile Crisis Response Team [place] (946) 311-8794 or www.SaleStream  · Call the toll free National Suicide Prevention Hotlines   · National Suicide Prevention Lifeline 330-689-WYCX (8495)  · National Hope Line Network 800-SUICIDE (992-0292)        Hospice  Hospice is a service that is designed to provide people who are terminally ill and their families with medical, spiritual, and psychological support. Its aim is to improve your quality of life by keeping you as alert and comfortable as possible. Hospice is performed by a team of health care professionals and volunteers who:  · Help keep you comfortable. Hospice can be provided in your home or in a homelike setting. The hospice staff works with your family and friends to help meet your needs. You will enjoy the support of loved ones by receiving much of your basic care from family and friends.  · Provide pain relief and manage your symptoms. The staff supply all necessary medicines and equipment.  · Provide companionship when you are alone.  · Allow you and your family to rest. They may do light housekeeping, prepare meals, and run errands.  · Provide counseling. They will make sure your emotional, spiritual, and social needs and those of your family are being met.  · Provide spiritual care. Spiritual care is individualized to meet your needs and your family's needs. It may involve helping you look at what death means to you, say goodbye, or perform a specific Religion ceremony or ritual.  Hospice teams often include:  · A nurse.  · A doctor.  · Social workers.  · Anabaptism leaders (such as a ).  · Trained volunteers.  WHEN SHOULD HOSPICE CARE BEGIN?  Most people who use hospice are believed to have fewer than 6 months to live. Your family and health care providers can help you decide when hospice services should begin. If your condition improves,  you may discontinue the program.  WHAT SHOULD I CONSIDER BEFORE SELECTING A PROGRAM?  Most hospice programs are run by nonprofit, independent organizations. Some are affiliated with hospitals, nursing homes, or home health care agencies. Hospice programs can take place in the home or at a hospice center, hospital, or skilled nursing facility. When choosing a hospice program, ask the following questions:  · What services are available to me?  · What services are offered to my loved ones?  · How involved are my loved ones?  · How involved is my health care provider?  · Who makes up the hospice care team? How are they trained or screened?  · How will my pain and symptoms be managed?  · If my circumstances change, can the services be provided in a different setting, such as my home or in the hospital?  · Is the program reviewed and licensed by the state or certified in some other way?  WHERE CAN I LEARN MORE ABOUT HOSPICE?  You can learn about existing hospice programs in your area from your health care providers. You can also read more about hospice online. The websites of the following organizations contain helpful information:  · The National Hospice and Palliative Care Organization (NHPCO).  · The Hospice Association of [place] (HAA).  · The Hospice Education [place].  · The American Cancer Society (ACS).  · Hospice Net.  This information is not intended to replace advice given to you by your health care provider. Make sure you discuss any questions you have with your health care provider.  Document Released: 04/05/2005 Document Revised: 12/23/2014 Document Reviewed: 10/28/2014  Elsevier Interactive Patient Education © 2017 Elsevier Inc.

## 2020-01-16 NOTE — FLOWSHEET NOTE
01/15/20 1710   Events/Summary/Plan   Events/Summary/Plan Initial RCP assessment, svn at this time   Interdisciplinary Plan of Care-Goals (Indications)   Bronchodilator Indications History / Diagnosis   Hyperinflation Protocol Indications Atelectasis Documented by Chest X-Ray   Interdisciplinary Plan of Care-Outcomes    Bronchodilator Outcome Improved Vital Signs and Measures of Gas Exchange   Hyperinflation Protocol Goals/Outcome Improvement in Repeat CXR   Education   Education Yes - Pt. / Family has been Instructed in use of Respiratory Equipment   SVN Group   #SVN Performed Yes   Given By: Mask   Date SVN Last Changed 01/15/20   Date SVN Next Change Due (Q 7 Days) 01/22/20   Incentive Spirometry Group   Incentive Spirometry Instruction Yes   Breathing Exercises Yes   Incentive Spirometer Volume 600 mL   Chest Exam   Work Of Breathing / Effort Shallow;Moderate   Respiration (!) 22  (post 22)   Pulse (!) 102  (post 105)   Breath Sounds   Pre/Post Intervention Pre Intervention Assessment  (increased aeration following svn)   RUL Breath Sounds Diminished;Transmitted Upper Airway Sound   RML Breath Sounds Diminished   RLL Breath Sounds Diminished   BRAYDEN Breath Sounds Diminished;Transmitted Upper Airway Sound   LLL Breath Sounds Diminished   Oximetry   #Pulse Oximetry (Single Determination) Yes   Oxygen   Home O2 Use Prior To Admission? Yes   Home O2 LPM Flow 2.5 LPM   Home O2 Delivery Method Nasal Cannula   Pulse Oximetry 98 %   O2 (LPM) 3   O2 Daily Delivery Respiratory  Nasal Cannula

## 2020-01-16 NOTE — THERAPY
"Physical Therapy Treatment completed.   Bed Mobility:  Supine to Sit: Maximal Assist(x2)  Transfers: Sit to Stand: Unable to Participate  Gait: Level Of Assist: Unable to Participate with Will Continue to Assess for Equipment Needs       Plan of Care: Will benefit from Physical Therapy 3 times per week  Discharge Recommendations: Equipment: Will Continue to Assess for Equipment Needs. Recommend post-acute placement for continued physical therapy services prior to discharge home. Patient can tolerate post-acute therapies at a 5x/week frequency.       See \"Rehab Therapy-Acute\" Patient Summary Report for complete documentation.       Pt was seen for therapy txt session. Pt was only able to tolerate sitting at the EOB for 10-12 mins and required Max A for all functional mobility with 2 person assist. Pt able to tolerate slight AROM in LLE and participate in seated Long Arc Quad, however, not very participatory during ther-ex due to pain and poor following. Family education on assisting with positioning and LLE PROM. Pt will continue to benefit from skilled PT while in house, with recommendation for post acute therapy needs.   "

## 2020-01-16 NOTE — PROGRESS NOTES
Received report from KALEIGH Omalley. Patient is sleep, Pt resting comfortably in bed, respirations even and unlabored, Call light within reach and safety precautions in place.         07:40 - Assess patient, plan of care discussed with patient, RT giving treatment, patient declines any needs at this time and denies pain. Bed is locked and low with alarm on, call light in hand

## 2020-01-16 NOTE — PROGRESS NOTES
Report received from KALEIGH Mata. Plan of care discussed. Patient resting comfortably in bed, declines any further needs at this time. Safety precautions in place.

## 2020-01-16 NOTE — FLOWSHEET NOTE
01/15/20 2055   Events/Summary/Plan   Events/Summary/Plan SVN with mask, pt awakened for therapy and dosed-off and on throughout tx   Non-Invasive Resp Device Site Inspection Completed Intact   Interdisciplinary Plan of Care-Goals (Indications)   Bronchodilator Indications History / Diagnosis   Interdisciplinary Plan of Care-Outcomes    Bronchodilator Outcome Improved Vital Signs and Measures of Gas Exchange   Education   Education Yes - Pt. / Family has been Instructed in use of Respiratory Medications and Adverse Reactions   RT Assessment of Delivered Medications   Evaluation of Medication Delivery Daily Yes-- Pt /Family has been Instructed in use of Respiratory Medications and Adverse Reactions   SVN Group   #SVN Performed Yes   Given By: Mask   Date SVN Last Changed 01/15/20   Date SVN Next Change Due (Q 7 Days) 01/22/20   Chest Exam   Work Of Breathing / Effort Mild   Respiration 20   Pulse 98   Breath Sounds   RUL Breath Sounds Clear   RML Breath Sounds Diminished   RLL Breath Sounds Diminished   BRAYDEN Breath Sounds Clear   LLL Breath Sounds Diminished   Oximetry   #Pulse Oximetry (Single Determination) Yes   Oxygen   Home O2 Use Prior To Admission? Yes   Home O2 LPM Flow 2.5 LPM   Home O2 Delivery Method Nasal Cannula   Home O2 Frequency of Use Continuous   Pulse Oximetry 99 %   O2 (LPM) 2.5   O2 Daily Delivery Respiratory  Silicone Nasal Cannula

## 2020-01-16 NOTE — DISCHARGE SUMMARY
Discharge Summary    CHIEF COMPLAINT ON ADMISSION  Chief Complaint   Patient presents with   • Fall   • Shoulder Pain     left   • Hip Pain     left    • Laceration       Reason for Admission  [facility]     Admission Date  1/8/2020    CODE STATUS  DNAR/DNI    HPI & HOSPITAL COURSE  This is a 89 y.o. female here with a fall at her assisted living [facility] and sustained fracture to left humerus and left hip.  She reached for her walker and felt dizzy and fell over.  She has a history of vertigo and orthostatic hypotension.   She had pinning of the left hip on 1/9 and non-operative management recommended per ortho for her humerus fracture.  She did not make a great improvement and continues to show decline and family decided to enlist services of hospice care.  Patient will transition back to [facility] with hospice care.    Therefore, she is discharged in guarded and stable condition to hospice.    The patient met 2-midnight criteria for an inpatient stay at the time of discharge.    Discharge Date  1/16/2020    FOLLOW UP ITEMS POST DISCHARGE  [facility]    DISCHARGE DIAGNOSES  Principal Problem:    Hip fracture (HCC) POA: Unknown  Active Problems:    Vertigo POA: Yes      Overview: 3/19/08: History of disequilibrium syndrome, secondary to labyrinthine       disease.      ICD-10 transition    Left bundle branch block (Chronic) POA: Yes    Seizure disorder (HCC) POA: Yes      Overview: 3/19/08: History of recurrent and somewhat difficult to treat seizure       disorder, on multiple medications at this time for same.    Sepsis (HCC) POA: Unknown    Idiopathic hypotension POA: Yes    Humerus fracture POA: Unknown    Metabolic acidosis POA: Unknown    Acute blood loss as cause of postoperative anemia POA: Unknown    Acute respiratory failure with hypoxia (HCC) POA: Unknown    Paroxysmal atrial fibrillation (HCC) POA: Unknown    Nausea and vomiting POA: Unknown  Resolved Problems:    * No resolved hospital problems.  *      FOLLOW UP  Future Appointments   Date Time Provider Department Center   2/20/2020  2:45 PM Andria Forde M.D. PULM None     [facility]  [address]  [place] [phone]          MEDICATIONS ON DISCHARGE     Medication List      CONTINUE taking these medications      Instructions   acetaminophen 500 MG Tabs  Commonly known as:  TYLENOL   Take 500 mg by mouth 3 times a day.  Dose:  500 mg     divalproex  MG Tb24  Commonly known as:  DEPAKOTE ER   Take 1,000 mg by mouth every bedtime.  Dose:  1,000 mg     famotidine 20 MG Tabs  Commonly known as:  PEPCID   Take 20 mg by mouth 2 times a day.  Dose:  20 mg     fluticasone 50 MCG/ACT nasal spray  Commonly known as:  FLONASE   Spray 1 Spray in nose every day.  Dose:  1 Spray     gabapentin 100 MG Caps  Commonly known as:  NEURONTIN   Take 100 mg by mouth every day.  Dose:  100 mg     methenamine hip 1 GM Tabs  Commonly known as:  HIPPREX   Take 1 g by mouth every day.  Dose:  1 g     OXcarbazepine 300 MG Tabs  Commonly known as:  TRILEPTAL   Take 300 mg by mouth every day.  Dose:  300 mg     pentosan 100 MG Caps  Commonly known as:  ELMIRON   Take 100 mg by mouth every day.  Dose:  100 mg        STOP taking these medications    aspirin 81 MG Chew chewable tablet  Commonly known as:  ASA     darifenacin 15 MG SR tablet  Commonly known as:  ENABLEX     fluticasone-salmeterol 100-50 MCG/DOSE Aepb  Commonly known as:  ADVAIR     LACTOBACILLUS PO     meloxicam 7.5 MG Tabs  Commonly known as:  MOBIC     midodrine 5 MG Tabs  Commonly known as:  PROAMATINE     omeprazole 20 MG delayed-release capsule  Commonly known as:  PRILOSEC     potassium chloride SA 10 MEQ Tbcr  Commonly known as:  K-DUR     PRESERVISION AREDS PO     sodium chloride 1 GM Tabs  Commonly known as:  SALT     Vitamin D3 2000 UNIT Caps        ASK your doctor about these medications      Instructions   ASPERCREME EX   1 Application by Apply externally route  as needed (Apply's to bothe legs).  Dose:  1 Application            Allergies  Allergies   Allergen Reactions   • Ciprofloxacin      SEIZURES.   • Prednisone Unspecified     dizziness       DIET  Orders Placed This Encounter   Procedures   • Diet Order Regular     Standing Status:   Standing     Number of Occurrences:   1     Order Specific Question:   Diet:     Answer:   Regular [1]       ACTIVITY  As tolerated.  Weight bearing as tolerated    CONSULTATIONS  Izaiah Rice - orthopedic surgery -    PROCEDURES  1/9/2020 - [place] -  IM pinning left hip 1/9/2020    LABORATORY  Lab Results   Component Value Date    SODIUM 138 01/14/2020    POTASSIUM 3.6 01/14/2020    CHLORIDE 103 01/14/2020    CO2 17 (L) 01/14/2020    GLUCOSE 109 (H) 01/14/2020    BUN 48 (H) 01/14/2020    CREATININE 1.30 01/14/2020        Lab Results   Component Value Date    WBC 11.7 (H) 01/14/2020    HEMOGLOBIN 7.3 (L) 01/14/2020    HEMATOCRIT 22.3 (L) 01/14/2020    PLATELETCT 102 (L) 01/14/2020        Total time of the discharge process exceeds 35 minutes.

## 2020-01-16 NOTE — DISCHARGE PLANNING
Anticipated Discharge Disposition: Home with [facility]    Action: Spoke with Taryn with [facility]. Per Taryn their nurse will be meeting pt's daughter at bedside this morning at 10 am. No DME is needed and pt can transfer to [facility] after that    Barriers to Discharge: None    Plan: D/C home with hospice

## 2020-01-16 NOTE — PROGRESS NOTES
Telemetry Shift Summary    Rhythm SR w/ BBB  HR Range 90s  Ectopy occ-freq PVCs, rare PACs, rare couplets  Measurements 0.20/0.16/0.40        Normal Values  Rhythm SR  HR Range    Measurements 0.12-0.20 / 0.06-0.10  / 0.30-0.52

## 2020-01-16 NOTE — CARE PLAN
Problem: Discharge Barriers/Planning  Goal: Patient's continuum of care needs will be met  Outcome: PROGRESSING AS EXPECTED   Patient being d/c back to [facility] today, will transport patient via gurney.     Problem: Respiratory:  Goal: Respiratory status will improve  Outcome: PROGRESSING AS EXPECTED   Worked with RT today patient states she is breathing easier, will work on IS and CDB today.

## 2020-01-16 NOTE — FLOWSHEET NOTE
01/16/20 0729   Events/Summary/Plan   Events/Summary/Plan svn   Interdisciplinary Plan of Care-Goals (Indications)   Bronchodilator Indications History / Diagnosis   Interdisciplinary Plan of Care-Outcomes    Bronchodilator Outcome Improved Vital Signs and Measures of Gas Exchange   SVN Group   #SVN Performed Yes   Given By: Mask   Chest Exam   Work Of Breathing / Effort Mild   Respiration 16  (post 16)   Pulse 88  (post 91)   Breath Sounds   Pre/Post Intervention Pre Intervention Assessment  (increased aeration following svn)   RUL Breath Sounds Clear   RML Breath Sounds Diminished   RLL Breath Sounds Diminished   BRAYDEN Breath Sounds Clear   LLL Breath Sounds Diminished   Oximetry   #Pulse Oximetry (Single Determination) Yes   Oxygen   Pulse Oximetry 97 %   O2 (LPM) 2.5  (02 titrated to 1.5 liters. RN notified)   O2 Daily Delivery Respiratory  Silicone Nasal Cannula

## 2020-01-16 NOTE — CARE PLAN
Problem: Infection  Goal: Will remain free from infection  Outcome: PROGRESSING AS EXPECTED  Note:   Oral care provided, standard precautions used in pt care, dressings assessed and intact.      Problem: Skin Integrity  Goal: Risk for impaired skin integrity will decrease  Outcome: PROGRESSING AS EXPECTED  Note:   [id number], barrier cream in use, purewick in place, pt checked frequently for incontinence.

## 2020-01-17 LAB
BACTERIA BLD CULT: NORMAL
BACTERIA BLD CULT: NORMAL
SIGNIFICANT IND 70042: NORMAL
SIGNIFICANT IND 70042: NORMAL
SITE SITE: NORMAL
SITE SITE: NORMAL
SOURCE SOURCE: NORMAL
SOURCE SOURCE: NORMAL

## 2020-01-17 NOTE — DOCUMENTATION QUERY
[facility]                                                                       Query Response Note      PATIENT:               RACHEL STALLINGS  ACCT #:                  [id number]  MRN:                     [id number]  :                      1930  ADMIT DATE:       2020 3:48 PM  DISCH DATE:        2020 10:48 AM  RESPONDING  PROVIDER #:        [id number]           QUERY TEXT:    Congestive Heart Failure is documented in the Medical Record. Please document the type and acuity (includes probable or suspected).     NOTE:  If an appropriate response is not listed below, please respond with a new note.    The patient's Clinical Indicators include:  Pt has hx of CHF, New ECHO showed EF of 20-25% and unable to determine diastolic function due to afib  -pulmonary edema (initial CXR neg for acute cardiopulmonary abnormality 20)  -repeat CXR 20 showed ill defined LLL and RUL opacities poss edema, atelectasis or pneumonitis  Treatment:  Lasix (helped work of breathing) but Pt developed hypotension  further intervention held as family considering comfort care   Risk:  Pt age and co-morbid conditions suffered GLF and fx (L) hip and (L) humerus and has become septic (not POA) - high mortality risk.  Options provided:   -- Acute Systolic heart failure   -- Chronic Systolic heart failure   -- Acute on Chronic Systolic heart failure   -- Acute Diastolic heart failure   -- Chronic Diastolic heart failure   -- Acute on Chronic Diastolic heart failure   -- Acute Systolic and Diastolic heart failure   -- Chronic Systolic and Diastolic heart failure   -- Acute on Chronic Systolic and diastolic heart failure   -- Unable to determine      Query created by: Treasure Harrington on 2020 2:43 PM    RESPONSE TEXT:    Acute on Chronic Systolic heart failure          Electronically signed by:  URVASHI KAMARA MD 2020 7:28 AM

## 2020-01-30 NOTE — OP REPORT
DATE OF SERVICE:  01/09/2020    PREOPERATIVE DIAGNOSIS:  Left femoral neck fracture.    POSTOPERATIVE DIAGNOSIS:  Left femoral neck fracture.    PROCEDURE PERFORMED:  Left hip hemiarthroplasty for femoral neck fracture.    SURGEON:  Izaiah Rice MD    ASSISTANT:  None.    ANESTHESIA:  General.    IMPLANTS UTILIZED:  [employer] cemented Synergy stem and fixation   cables.    OPERATIVE INDICATIONS:  The patient is an 89-year-old woman who presented to   the hospital after a ground level fall resulting in a left femoral neck   fracture.  She had a similar injury on her contralateral side that had been   also treated with hemiarthroplasty in the past.  We discussed this new injury   with her family and her and talked about treatment options.  We recommended   hip hemiarthroplasty as a way to allow her for earlier range of motion and   mobility and weightbearing, also improve her pain control as well.  We   discussed that the risks of surgery include, but are not limited to infection,   wound healing complications.  Likely discrepancies dislocation, injury to   neurovascular structures such as the sciatic nerve as well as loss of mobility   from the injury itself.  They stated they understood these and agreed they   wished to proceed.    PROCEDURE IN DETAIL:  The patient was seen in the preoperative holding on the   date of surgery.  The left hip was marked with surgeon's initials.  She was   then taken to the operating room and placed supine on the operative table.    Anesthesia was induced.  The patient was then transitioned to a right lateral   decubitus position on a beanbag.  All extremities were well padded, including   using an axillary roll.  The left leg was prepped and draped in normal sterile   fashion.  An operative pause was conducted.  Correct patient, site, side, and   procedure were identified as well as surgeon's initial on the operative   extremity.  Preoperative antibiotics were given.  At  this point, a posterior   approach was taken to the left hip.  This was taken down through subcutaneous   tissues and fascia with Bovie cautery to reduce bleeding.  The gluteus muscle   was bluntly split in line with its fibers.  Charnley retractor was placed.  We   then released the piriformis and short external rotators from the hip.  A   T-shaped capsulotomy was performed.  At this point, we made a new neck cut   roughly a centimeter above the lesser trochanter.  We removed the femoral head   with a corkscrew and Pacheco.  This was sized on the back table.  We trialed a   shell of the same size, this had excellent suction and minimal friction to   motion.  At this point, we turned our attention to preparation of the femur.    This was done with femoral neck elevator.  We used a canal finder, box   osteotome followed by a lateralizing reamer.  We then hand reamed and then   hand broached until we had a good fit and fill of the canal.  This stem had   good purchase and we trialed reduction.  We reduced the hip and this showed   good stability with flexion, internal rotation, with flexion of 90 degrees and   internal rotation of greater than 45 degrees.  We also had near equal leg   lengths.  Based off this, the hip was redislocated and we inserted a pressfit   stem of the same size.  As this was being fully seated, a crack was noted to   form near the greater trochanter.  The incision was extended to further   explore this.  This did seem to have propagated along the greater trochanter,   but not medially towards the calcar.  We removed pressfit stem at this point   and we placed 2 cables around the greater trochanter to secure this.  When   these were fully tightened, the fracture line disappeared visually.  At this   point, we switched to a cemented implant.  A cement restrictor was placed.  We   then retrograde filled the canal with cement and used some pressurization for   the cement.  Implant was then inserted  and maintained in its version until   the cement was fully hardened and unable to be done through with a knife.  We   then retrialed, again saw similar stability, impacted on the final head and   reduced the hip.  The hip was thoroughly irrigated and we placed 0.5 g of   vancomycin deep in the fascia and a 0.5 g over the fascia.  We repaired the   capsulotomy as well as the piriformis using Ti-Cron suture.  The fascia was   repaired with #1 Vicryl and the subcutaneous tissues were closed with 0 and   2-0 Vicryl, as well as staples for the skin.  Sterile dressings were placed.    The patient was then allowed to awake in the operating room.  She was then   taken to PACU in stable condition.    POSTOPERATIVE PLAN:  She will be weightbearing as tolerated to bilateral lower   extremities.  Nonweightbearing to left upper extremity due to the proximal   humerus fracture.  We will work with physical and occupational therapy to help   mobilize and work on ADLs.  We will work on discharge planning with the aim   of returning back to her assisted living facility.  We will recheck the wounds   at 2 weeks' time and remove staples at that point while wounds are healing.    DVT prophylaxis with Lovenox or other chemoprophylaxis for 4 weeks.       ____________________________________     MD ESTRELLA Tyler / JOSE    DD:  01/30/2020 13:04:43  DT:  01/30/2020 14:38:37    D#:  [id number]  Job#:  [id number]

## 2021-09-05 NOTE — ED NOTES
0800 Assessment completed, patient is alert and oriented x 4, patient denies any pain at this time as well as shortness of breath. Patient does have a strong non productive cough.     0915 Assisted patient to bathroom and back to bed, patient's HR up to 190s with ambulation, patient asymptomatic. Safety precautions in place.     1020 Rounded with Dr. Lester at bedside, notified MD of patient's HR and metoprolol being held due to parameters. Dr. Lester would like scheduled metoprolol to be given at this time, see MAR. Safety precautions in place.    Hourly rounding. Family at bedside. Pt given warm blanket

## 2022-10-23 NOTE — PROGRESS NOTES
Hospital Medicine Daily Progress Note    Date of Service  1/13/2020    Chief Complaint  89 y.o. female admitted 1/8/2020 with pain after a fall    Hospital Course    This is an 88 yo female who fell on her left side and sustained a left humerus fracture and a left hip fracture. Orthopedic surgery is consulted and Dr. Rice recommends left hip surgical repair and no surgery for the left arm fracture.      Interval Problem Update  The patient was afebrile last night  She did have tachycardia up to 128 and EKG confirmed atrial fibrillation    Consultants/Specialty  Orthopedic surgery dr. Rice    Code Status  DNR    Disposition  snf vs hospice    Review of Systems  Review of Systems   Unable to perform ROS: Mental acuity        Physical Exam  Temp:  [36.3 °C (97.4 °F)-36.9 °C (98.5 °F)] 36.4 °C (97.5 °F)  Pulse:  [] 113  Resp:  [18-20] 18  BP: ()/(40-84) 86/51  SpO2:  [97 %-100 %] 100 %    Physical Exam  Vitals signs and nursing note reviewed.   Constitutional:       General: She is not in acute distress.     Appearance: She is obese. She is not ill-appearing or diaphoretic.   HENT:      Head:      Comments: Laceration over left eyebrown, bruising and swelling of left eyelids     Nose: No rhinorrhea.      Mouth/Throat:      Mouth: Mucous membranes are dry.      Pharynx: Oropharynx is clear. No oropharyngeal exudate or posterior oropharyngeal erythema.   Eyes:      General:         Right eye: No discharge.         Left eye: No discharge.      Conjunctiva/sclera: Conjunctivae normal.      Pupils: Pupils are equal, round, and reactive to light.   Neck:      Musculoskeletal: Neck supple. No muscular tenderness.   Cardiovascular:      Rate and Rhythm: Regular rhythm. Tachycardia present.      Pulses: Normal pulses.      Heart sounds: Heart sounds are distant. No murmur.   Pulmonary:      Effort: Tachypnea and accessory muscle usage present. No respiratory distress.      Breath sounds: No stridor. Decreased  breath sounds present. No wheezing or rhonchi.   Abdominal:      General: Bowel sounds are normal. There is no distension.      Palpations: Abdomen is soft.      Tenderness: There is no tenderness.   Musculoskeletal:         General: Swelling, tenderness and signs of injury present.      Comments: Left hip surgical bandage in place with some dried blood   Lymphadenopathy:      Cervical: No cervical adenopathy.   Skin:     General: Skin is dry.      Coloration: Skin is pale. Skin is not jaundiced.      Findings: Bruising present. No erythema.      Comments: Bruise over left arm and left hip   Neurological:      Mental Status: She is lethargic.      Coordination: Coordination abnormal.   Psychiatric:         Mood and Affect: Mood normal.         Cognition and Memory: Memory is impaired.         Fluids    Intake/Output Summary (Last 24 hours) at 1/13/2020 1300  Last data filed at 1/13/2020 0738  Gross per 24 hour   Intake 328.75 ml   Output 300 ml   Net 28.75 ml       Laboratory  Recent Labs     01/11/20 0438 01/11/20 2307 01/12/20  0338   WBC 9.3 14.2* 15.7*   RBC 2.20* 2.73* 2.32*   HEMOGLOBIN 7.0* 8.9* 7.5*   HEMATOCRIT 21.2* 26.0* 21.6*   MCV 96.4 95.2 93.1   MCH 31.8 32.6 32.3   MCHC 33.0* 34.2 34.7   RDW 46.4 50.0 48.9   PLATELETCT 68* 105* 87*   MPV 10.4 10.9 10.8     Recent Labs     01/11/20 0438 01/11/20 2307 01/12/20  0338   SODIUM 136 139 137   POTASSIUM 4.0 3.6 3.4*   CHLORIDE 105 101 102   CO2 20 20 20   GLUCOSE 125* 160* 151*   BUN 19 22 22   CREATININE 0.53 0.57 0.61   CALCIUM 8.0* 8.4 8.2*                   Imaging  EC-ECHOCARDIOGRAM COMPLETE W/O CONT   Final Result      DX-CHEST-PORTABLE (1 VIEW)   Final Result      Improving right upper lobe consolidation      Otherwise stable chest with bibasilar atelectasis, probable pleural fluid and some consolidation likely      DX-CHEST-PORTABLE (1 VIEW)   Final Result      1.  Enlarged cardiac silhouette with changes of interstitial edema.   2.  Ill-defined  left lower lobe and right upper lobe opacities could be due to edema, atelectasis or pneumonitis.      DX-HIP-UNILATERAL-WITH PELVIS-1 VIEW LEFT   Final Result      Status post left hip hemiarthroplasty.      DX-HUMERUS 2+ LEFT   Final Result      Comminuted impacted left humeral neck fracture.      DX-SHOULDER 2+ LEFT   Final Result      Comminuted mildly angulated and impacted left humeral neck fracture.      DX-CHEST-LIMITED (1 VIEW)   Final Result      No acute cardiopulmonary abnormality.      DX-FEMUR-2+ LEFT   Final Result      1.  Acute left transcervical/basicervical femoral neck fracture.   2.  Osteopenia.      DX-PELVIS-1 OR 2 VIEWS   Final Result      1.  Acute left transcervical/basicervical femoral neck fracture.   2.  Osteopenia.      CT-HEAD W/O   Final Result      1.  Chronic ischemic changes.   2.  No acute intracranial abnormality.           Assessment/Plan  * Hip fracture (HCC)  Assessment & Plan  Pain control with oxycodone as needed  Left hip surgical repair done 1/9  Patient unable to work with PT due to hypotension  Hospice referral placed      Paroxysmal atrial fibrillation (HCC)  Assessment & Plan  New overnight the patient went into rapid atrial fibrillation. Amiodarone was ordered but this morning I met with family at the bedside who have asked to stop this medication as the patient has such low blood pressure and difficulty breathing they do not want any lung toxicity or farther lowering of her blood pressure and instead are asking about hospice services    Acute respiratory failure with hypoxia (HCC)  Assessment & Plan  Pulmonary edema on chest xray  Lasix given with improved work of breathing  However the patient developed hypotension with systolic blood pressure down to 64 that responded partially to fluids  Echocardiogram shows EF 20-25% and mitral valve dysfunction that is moderate to severe, I discussed this with cardiology, Dr. Sanz who recommends hospice    Acute blood loss  as cause of postoperative anemia  Assessment & Plan  Blood transfused, hemoglobin is lower today, will follow trend    Metabolic acidosis  Assessment & Plan  Treated with fluids with improvement  Continue antibiotics    Humerus fracture  Assessment & Plan  Pain control    Passive and active assisted range of motion and non weight bearing to the injured extremity.   Will need repeat xray and follow up with Dr. Rice in two weeks    Idiopathic hypotension- (present on admission)  Assessment & Plan  Midodrine as patient has chronic hypotension  Worsened hypotension is due to sepsis now improved to her baseline    Sepsis (HCC)  Assessment & Plan  This is Sepsis Not present on admission  SIRS criteria identified on my evaluation include: Fever, with temperature greater than 101 deg F, Tachycardia, with heart rate greater than 90 BPM, Tachypnea, with respirations greater than 20 per minute and Leukocyosis, with WBC greater than 12,000  Source is pneumonia  Sepsis protocol initiated  Fluid resuscitation ordered per protocol  IV antibiotics as appropriate for source of sepsis  Continue zosyn for now  Family deciding on comfort care vs hospice        Seizure disorder (HCC)- (present on admission)  Assessment & Plan  Controlled on depakote, trileptal and neurontin, will continue meds    Left bundle branch block- (present on admission)  Assessment & Plan  Chronic and unchanged on ekgs    Vertigo- (present on admission)  Assessment & Plan  Treat as needed  Blood for dizziness caused by anemia     The patient's family has many questions about hospice, comfort care and palliative care. I spent 52 minutes at the bedside in discussion about these different options from 7:42 until 8:34. Hospice referral is placed.  VTE prophylaxis: lovenox        [place]
